# Patient Record
Sex: FEMALE | Race: WHITE | NOT HISPANIC OR LATINO | Employment: UNEMPLOYED | URBAN - METROPOLITAN AREA
[De-identification: names, ages, dates, MRNs, and addresses within clinical notes are randomized per-mention and may not be internally consistent; named-entity substitution may affect disease eponyms.]

---

## 2018-02-12 RX ORDER — CYCLOBENZAPRINE HCL 5 MG
5 TABLET ORAL EVERY 6 HOURS PRN
Refills: 5 | COMMUNITY
Start: 2018-01-25 | End: 2020-07-15

## 2018-02-12 RX ORDER — SIMETHICONE 80 MG
TABLET,CHEWABLE ORAL
Refills: 5 | COMMUNITY
Start: 2018-01-25 | End: 2020-07-15

## 2018-02-12 RX ORDER — LUBIPROSTONE 24 UG/1
CAPSULE, GELATIN COATED ORAL
Refills: 3 | COMMUNITY
Start: 2018-01-25 | End: 2018-03-22 | Stop reason: ALTCHOICE

## 2018-02-12 RX ORDER — LORATADINE 10 MG/1
10 TABLET ORAL DAILY
Refills: 5 | COMMUNITY
Start: 2018-01-29

## 2018-02-12 RX ORDER — ONDANSETRON 4 MG/1
4 TABLET, FILM COATED ORAL EVERY 8 HOURS PRN
Refills: 1 | COMMUNITY
Start: 2017-12-26 | End: 2018-03-22 | Stop reason: ALTCHOICE

## 2018-02-12 RX ORDER — HYDROCORTISONE ACETATE 25 MG/1
SUPPOSITORY RECTAL
Refills: 0 | COMMUNITY
Start: 2018-01-19 | End: 2020-07-15

## 2018-02-12 RX ORDER — FAMOTIDINE 20 MG/1
20 TABLET, FILM COATED ORAL 2 TIMES DAILY
Refills: 5 | COMMUNITY
Start: 2018-01-19 | End: 2019-01-14

## 2018-02-12 RX ORDER — BETHANECHOL CHLORIDE 25 MG/1
25 TABLET ORAL 4 TIMES DAILY
Refills: 1 | COMMUNITY
Start: 2018-01-29 | End: 2018-03-22 | Stop reason: ALTCHOICE

## 2018-02-12 RX ORDER — RANITIDINE 150 MG/1
150 TABLET ORAL 2 TIMES DAILY
Refills: 6 | COMMUNITY
Start: 2018-01-29 | End: 2019-01-14 | Stop reason: SDUPTHER

## 2018-02-12 RX ORDER — SUCRALFATE 1 G/1
TABLET ORAL
Refills: 5 | COMMUNITY
Start: 2018-01-19 | End: 2020-09-14

## 2018-02-12 RX ORDER — DIVALPROEX SODIUM 500 MG/1
500 TABLET, EXTENDED RELEASE ORAL 2 TIMES DAILY
Refills: 2 | COMMUNITY
Start: 2018-01-29

## 2018-02-12 RX ORDER — POLYETHYLENE GLYCOL 3350 17 G/17G
POWDER, FOR SOLUTION ORAL
Refills: 3 | COMMUNITY
Start: 2018-01-29 | End: 2019-11-02

## 2018-02-12 RX ORDER — LIDOCAINE HYDROCHLORIDE 30 MG/G
CREAM TOPICAL
Refills: 5 | COMMUNITY
Start: 2017-11-14 | End: 2020-07-15

## 2018-02-16 ENCOUNTER — OFFICE VISIT (OUTPATIENT)
Dept: GASTROENTEROLOGY | Facility: CLINIC | Age: 48
End: 2018-02-16

## 2018-02-16 VITALS
WEIGHT: 179 LBS | HEART RATE: 84 BPM | HEIGHT: 67 IN | SYSTOLIC BLOOD PRESSURE: 115 MMHG | DIASTOLIC BLOOD PRESSURE: 70 MMHG | BODY MASS INDEX: 28.09 KG/M2

## 2018-02-16 DIAGNOSIS — Z00.00 ROUTINE GENERAL MEDICAL EXAMINATION AT A HEALTH CARE FACILITY: Primary | ICD-10-CM

## 2018-03-22 ENCOUNTER — OFFICE VISIT (OUTPATIENT)
Dept: GASTROENTEROLOGY | Facility: CLINIC | Age: 48
End: 2018-03-22
Payer: COMMERCIAL

## 2018-03-22 VITALS
HEIGHT: 67 IN | WEIGHT: 172 LBS | HEART RATE: 84 BPM | BODY MASS INDEX: 27 KG/M2 | DIASTOLIC BLOOD PRESSURE: 72 MMHG | SYSTOLIC BLOOD PRESSURE: 118 MMHG

## 2018-03-22 DIAGNOSIS — K21.9 GASTROESOPHAGEAL REFLUX DISEASE, ESOPHAGITIS PRESENCE NOT SPECIFIED: ICD-10-CM

## 2018-03-22 DIAGNOSIS — R10.30 LOWER ABDOMINAL PAIN: ICD-10-CM

## 2018-03-22 DIAGNOSIS — K59.04 CHRONIC IDIOPATHIC CONSTIPATION: Primary | ICD-10-CM

## 2018-03-22 DIAGNOSIS — R13.14 PHARYNGOESOPHAGEAL DYSPHAGIA: ICD-10-CM

## 2018-03-22 PROCEDURE — 99214 OFFICE O/P EST MOD 30 MIN: CPT | Performed by: NURSE PRACTITIONER

## 2018-03-22 RX ORDER — PROMETHAZINE HYDROCHLORIDE 25 MG/1
25 TABLET ORAL EVERY 6 HOURS PRN
COMMUNITY
End: 2020-07-15

## 2018-03-22 NOTE — PROGRESS NOTES
Assessment/Plan:    Please stop the amitiza and decrease Miralax to once daily  We will begin Linzes 145 mcg- samples given  EGD and colonoscopy scheduled  Please follow up 2 weeks after the scopes so we can adjust the medications  Stop the Bethanechol- continue the dicyclomine, sucralfate as prescribed by PCP  Bring med list with next visit         Problem List Items Addressed This Visit     Chronic idiopathic constipation - Primary    Relevant Medications    Linaclotide (LINZESS) 145 MCG CAPS    Other Relevant Orders    Case request operating room: EGD AND COLONOSCOPY (Completed)    Lower abdominal pain    Relevant Orders    Case request operating room: EGD AND COLONOSCOPY (Completed)    Pharyngoesophageal dysphagia    Relevant Orders    Case request operating room: EGD AND COLONOSCOPY (Completed)    Gastroesophageal reflux disease    Relevant Orders    Case request operating room: EGD AND COLONOSCOPY (Completed)            Subjective:      Patient ID: Willie Hightower is a 57 y o  female  57year old female here - previous GI diagnoses include GERD and IBS constipation prominent  She also carries the diagnoses - bipolar and smokes 2 ppd of cigarettes  Last EGD and colonoscopy in 2016-   Patient reports at least a 6 month history of feeling food get stuck in her esophagus- solids only  She is currently taking Zantac BID, Pepcid BID and Nexium daily and is now also taking reglan and Carafate  Still no improvement  She has heartburn but no sore throat, worsening hoarse voice or vomiting  She does have occasional nausea  She has had longstanding constipation and hemorrhoids- she had banding of hemorrhoids in the past but they are back and her PCP ordered suppositories which she will continue to use  She does have lower abdominal pain prior and relieved by bowel movements, bowel movements every other day that are hard and she has to " push hard" to evacuate  No weight loss or fever   Longstanding fatigue but no worsening  She was prescribed Bethanechol by her former gastroenterologist but is now taking Bentyl with it- she will stop the prior and continue the Bentyl  No known glaucoma  Sweta Zaman is not working- she requires Miralax BID and is still having difficulties along with increase gas- we will switch to Linzes and decrease Miralax to daily and hold for loose stool Her  is here - we will adjust UGI meds with next visit  She will bring a list of meds with her next time          The following portions of the patient's history were reviewed and updated as appropriate:   She  has a past medical history of GERD (gastroesophageal reflux disease) and Psychiatric disorder  She   Patient Active Problem List    Diagnosis Date Noted    Chronic idiopathic constipation 03/22/2018    Lower abdominal pain 03/22/2018    Pharyngoesophageal dysphagia 03/22/2018    Gastroesophageal reflux disease 03/22/2018     She  has a past surgical history that includes Cholecystectomy and Tubal ligation  Her family history is not on file  She  reports that she has been smoking Cigarettes  She does not have any smokeless tobacco history on file  She reports that she does not drink alcohol or use drugs  Current Outpatient Prescriptions   Medication Sig Dispense Refill    citalopram (CeleXA) 20 mg tablet Take 20 mg by mouth daily      cyclobenzaprine (FLEXERIL) 5 mg tablet Take 5 mg by mouth every 6 (six) hours as needed  5    DEXILANT 60 MG capsule Take 1 capsule by mouth daily  2    dicyclomine (BENTYL) 20 mg tablet Take 20 mg by mouth every 6 (six) hours      divalproex sodium (DEPAKOTE ER) 500 mg 24 hr tablet Take 500 mg by mouth 2 (two) times a day  2    divalproex sodium (DEPAKOTE) 250 mg EC tablet Take 250 mg by mouth 2 (two) times a day      famotidine (PEPCID) 20 mg tablet Take 20 mg by mouth 2 (two) times a day  5    hydrocortisone (ANUSOL-HC) 25 mg suppository Insert 1 suppository into the rectum 2 times a day  0    Lidocaine HCl 3 % CREA Apply to back 3 times a day as needed  5    loratadine (CLARITIN) 10 mg tablet Take 10 mg by mouth daily  5    metoclopramide (REGLAN) 5 mg tablet Take 5 mg by mouth 4 (four) times a day      Multiple Vitamins-Calcium (ONE-A-DAY WOMENS FORMULA PO) Take by mouth      Multiple Vitamins-Minerals (ONE-A-DAY WOMENS 50+ ADVANTAGE PO) Take 1 tablet by mouth daily  11    polyethylene glycol (GLYCOLAX) powder mix 17g(1 measuring cap) WITH ONE cup OF WATER OR JUICE AND drink solution DAILY  3    promethazine (PHENERGAN) 25 mg tablet Take 25 mg by mouth every 6 (six) hours as needed for nausea or vomiting      ranitidine (ZANTAC) 150 mg tablet Take 150 mg by mouth 2 (two) times a day  6    simethicone (MYLICON) 80 mg chewable tablet TAKE ONE TABLET BY MOUTH EVERY SIX HOURS FOR FLATULENCE  5    sucralfate (CARAFATE) 1 g tablet TAKE ONE TABLET BY MOUTH FOUR TIMES DAILY Before meals and bedtime  5    Linaclotide (LINZESS) 145 MCG CAPS Take 1 capsule (145 mcg total) by mouth daily 16 capsule 0     No current facility-administered medications for this visit  Current Outpatient Prescriptions on File Prior to Visit   Medication Sig    citalopram (CeleXA) 20 mg tablet Take 20 mg by mouth daily    cyclobenzaprine (FLEXERIL) 5 mg tablet Take 5 mg by mouth every 6 (six) hours as needed    DEXILANT 60 MG capsule Take 1 capsule by mouth daily    dicyclomine (BENTYL) 20 mg tablet Take 20 mg by mouth every 6 (six) hours    divalproex sodium (DEPAKOTE ER) 500 mg 24 hr tablet Take 500 mg by mouth 2 (two) times a day    divalproex sodium (DEPAKOTE) 250 mg EC tablet Take 250 mg by mouth 2 (two) times a day    famotidine (PEPCID) 20 mg tablet Take 20 mg by mouth 2 (two) times a day    hydrocortisone (ANUSOL-HC) 25 mg suppository Insert 1 suppository into the rectum 2 times a day      Lidocaine HCl 3 % CREA Apply to back 3 times a day as needed    loratadine (CLARITIN) 10 mg tablet Take 10 mg by mouth daily    metoclopramide (REGLAN) 5 mg tablet Take 5 mg by mouth 4 (four) times a day    Multiple Vitamins-Calcium (ONE-A-DAY WOMENS FORMULA PO) Take by mouth    Multiple Vitamins-Minerals (ONE-A-DAY WOMENS 50+ ADVANTAGE PO) Take 1 tablet by mouth daily   polyethylene glycol (GLYCOLAX) powder mix 17g(1 measuring cap) WITH ONE cup OF WATER OR JUICE AND drink solution DAILY    ranitidine (ZANTAC) 150 mg tablet Take 150 mg by mouth 2 (two) times a day    simethicone (MYLICON) 80 mg chewable tablet TAKE ONE TABLET BY MOUTH EVERY SIX HOURS FOR FLATULENCE    sucralfate (CARAFATE) 1 g tablet TAKE ONE TABLET BY MOUTH FOUR TIMES DAILY Before meals and bedtime    [DISCONTINUED] AMITIZA 24 MCG capsule t1tc BY MOUTH TWICE DAILY WITH FOOD    [DISCONTINUED] mupirocin (BACTROBAN) 2 % ointment APPLY TO THE AFFECTED AREA(S) ON FACE TWICE DAILY FOR TEN DAYS    [DISCONTINUED] omeprazole (PriLOSEC) 20 mg delayed release capsule Take 20 mg by mouth daily    [DISCONTINUED] bethanechol (URECHOLINE) 25 mg tablet Take 25 mg by mouth 4 (four) times a day    [DISCONTINUED] ondansetron (ZOFRAN) 4 mg tablet Take 4 mg by mouth every 8 (eight) hours as needed for nausea     No current facility-administered medications on file prior to visit  She is allergic to penicillins       Review of Systems   Constitutional: Negative  HENT: Negative  Eyes: Negative  Respiratory: Negative  Cardiovascular: Negative  Gastrointestinal: Positive for constipation and nausea  Dysphagia   Endocrine: Negative  Genitourinary: Positive for difficulty urinating  Musculoskeletal: Negative  Objective:      /72   Pulse 84   Ht 5' 7" (1 702 m)   Wt 78 kg (172 lb)   BMI 26 94 kg/m²          Physical Exam   Constitutional: She appears well-developed and well-nourished  Eyes: No scleral icterus  Cardiovascular: Normal rate and regular rhythm  Pulmonary/Chest: She has wheezes  Abdominal: Soft  Bowel sounds are normal    Lymphadenopathy:     She has no cervical adenopathy  Skin: Skin is warm and dry

## 2018-03-26 ENCOUNTER — TELEPHONE (OUTPATIENT)
Dept: GASTROENTEROLOGY | Facility: CLINIC | Age: 48
End: 2018-03-26

## 2018-03-26 NOTE — TELEPHONE ENCOUNTER
Dr Jessica Del Angel pt    Pt called in to r/s egd/colon scheduled on 4/3   Please call her back @ [phone]

## 2018-04-02 ENCOUNTER — TELEPHONE (OUTPATIENT)
Dept: GASTROENTEROLOGY | Facility: CLINIC | Age: 48
End: 2018-04-02

## 2018-04-17 ENCOUNTER — OFFICE VISIT (OUTPATIENT)
Dept: GASTROENTEROLOGY | Facility: CLINIC | Age: 48
End: 2018-04-17
Payer: COMMERCIAL

## 2018-04-17 VITALS
BODY MASS INDEX: 26.84 KG/M2 | HEART RATE: 80 BPM | HEIGHT: 67 IN | SYSTOLIC BLOOD PRESSURE: 122 MMHG | DIASTOLIC BLOOD PRESSURE: 70 MMHG | WEIGHT: 171 LBS

## 2018-04-17 DIAGNOSIS — R10.30 LOWER ABDOMINAL PAIN: ICD-10-CM

## 2018-04-17 DIAGNOSIS — K21.9 GASTROESOPHAGEAL REFLUX DISEASE, ESOPHAGITIS PRESENCE NOT SPECIFIED: ICD-10-CM

## 2018-04-17 DIAGNOSIS — K59.04 CHRONIC IDIOPATHIC CONSTIPATION: Primary | ICD-10-CM

## 2018-04-17 PROCEDURE — 99214 OFFICE O/P EST MOD 30 MIN: CPT | Performed by: NURSE PRACTITIONER

## 2018-04-17 NOTE — PROGRESS NOTES
Assessment/Plan:    The  for [facility] will fax over medications       Problem List Items Addressed This Visit     Chronic idiopathic constipation - Primary    Lower abdominal pain    Gastroesophageal reflux disease            Subjective:      Patient ID: Merced Aquino is a 57 y o  female  46-year-old female is here for repeat visit  She was specimen B visit 2 weeks after the scopes but she rescheduled the scopes until May 1  I tried to review her upper GI meds looks like she is taken at least 5 could be more  I prescribed Carafate 4 and asked her to stop her Pepcid and Zantac at her last visit but it doesn't look like she has stopped  The Carafate is helping her  Her  will obtain the medications that The pharmacy send over to the patient's Y can have a more accurate idea of what her medications are  She really does not complain of upper GI symptoms presently but she does have some generalized lower abdominal pain that seems to be improved now that she is on Linzess-she is having daily bowel movements that are soft and formed  She reports no hemorrhoids and her appetite has improved  She continues to smoke cigarettes        The following portions of the patient's history were reviewed and updated as appropriate:   She  has a past medical history of GERD (gastroesophageal reflux disease) and Psychiatric disorder  She   Patient Active Problem List    Diagnosis Date Noted    Chronic idiopathic constipation 03/22/2018    Lower abdominal pain 03/22/2018    Pharyngoesophageal dysphagia 03/22/2018    Gastroesophageal reflux disease 03/22/2018     She  has a past surgical history that includes Cholecystectomy and Tubal ligation  Her family history is not on file  She  reports that she has been smoking Cigarettes  She does not have any smokeless tobacco history on file  She reports that she does not drink alcohol or use drugs    Current Outpatient Prescriptions   Medication Sig Dispense Refill  citalopram (CeleXA) 20 mg tablet Take 20 mg by mouth daily      cyclobenzaprine (FLEXERIL) 5 mg tablet Take 5 mg by mouth every 6 (six) hours as needed  5    DEXILANT 60 MG capsule Take 1 capsule by mouth daily  2    dicyclomine (BENTYL) 20 mg tablet Take 20 mg by mouth every 6 (six) hours      divalproex sodium (DEPAKOTE ER) 500 mg 24 hr tablet Take 500 mg by mouth 2 (two) times a day  2    divalproex sodium (DEPAKOTE) 250 mg EC tablet Take 250 mg by mouth 2 (two) times a day      famotidine (PEPCID) 20 mg tablet Take 20 mg by mouth 2 (two) times a day  5    hydrocortisone (ANUSOL-HC) 25 mg suppository Insert 1 suppository into the rectum 2 times a day   0    Lidocaine HCl 3 % CREA Apply to back 3 times a day as needed  5    Linaclotide (LINZESS) 145 MCG CAPS Take 1 capsule (145 mcg total) by mouth daily 16 capsule 0    loratadine (CLARITIN) 10 mg tablet Take 10 mg by mouth daily  5    metoclopramide (REGLAN) 5 mg tablet Take 5 mg by mouth 4 (four) times a day      Multiple Vitamins-Calcium (ONE-A-DAY WOMENS FORMULA PO) Take by mouth      Multiple Vitamins-Minerals (ONE-A-DAY WOMENS 50+ ADVANTAGE PO) Take 1 tablet by mouth daily  11    polyethylene glycol (GLYCOLAX) powder mix 17g(1 measuring cap) WITH ONE cup OF WATER OR JUICE AND drink solution DAILY  3    promethazine (PHENERGAN) 25 mg tablet Take 25 mg by mouth every 6 (six) hours as needed for nausea or vomiting      ranitidine (ZANTAC) 150 mg tablet Take 150 mg by mouth 2 (two) times a day  6    simethicone (MYLICON) 80 mg chewable tablet TAKE ONE TABLET BY MOUTH EVERY SIX HOURS FOR FLATULENCE  5    sucralfate (CARAFATE) 1 g tablet TAKE ONE TABLET BY MOUTH FOUR TIMES DAILY Before meals and bedtime  5     No current facility-administered medications for this visit        Current Outpatient Prescriptions on File Prior to Visit   Medication Sig    citalopram (CeleXA) 20 mg tablet Take 20 mg by mouth daily    cyclobenzaprine (FLEXERIL) 5 mg tablet Take 5 mg by mouth every 6 (six) hours as needed    DEXILANT 60 MG capsule Take 1 capsule by mouth daily    dicyclomine (BENTYL) 20 mg tablet Take 20 mg by mouth every 6 (six) hours    divalproex sodium (DEPAKOTE ER) 500 mg 24 hr tablet Take 500 mg by mouth 2 (two) times a day    divalproex sodium (DEPAKOTE) 250 mg EC tablet Take 250 mg by mouth 2 (two) times a day    famotidine (PEPCID) 20 mg tablet Take 20 mg by mouth 2 (two) times a day    hydrocortisone (ANUSOL-HC) 25 mg suppository Insert 1 suppository into the rectum 2 times a day   Lidocaine HCl 3 % CREA Apply to back 3 times a day as needed    Linaclotide (LINZESS) 145 MCG CAPS Take 1 capsule (145 mcg total) by mouth daily    loratadine (CLARITIN) 10 mg tablet Take 10 mg by mouth daily    metoclopramide (REGLAN) 5 mg tablet Take 5 mg by mouth 4 (four) times a day    Multiple Vitamins-Calcium (ONE-A-DAY WOMENS FORMULA PO) Take by mouth    Multiple Vitamins-Minerals (ONE-A-DAY WOMENS 50+ ADVANTAGE PO) Take 1 tablet by mouth daily   polyethylene glycol (GLYCOLAX) powder mix 17g(1 measuring cap) WITH ONE cup OF WATER OR JUICE AND drink solution DAILY    promethazine (PHENERGAN) 25 mg tablet Take 25 mg by mouth every 6 (six) hours as needed for nausea or vomiting    ranitidine (ZANTAC) 150 mg tablet Take 150 mg by mouth 2 (two) times a day    simethicone (MYLICON) 80 mg chewable tablet TAKE ONE TABLET BY MOUTH EVERY SIX HOURS FOR FLATULENCE    sucralfate (CARAFATE) 1 g tablet TAKE ONE TABLET BY MOUTH FOUR TIMES DAILY Before meals and bedtime     No current facility-administered medications on file prior to visit  She is allergic to penicillins       Review of Systems   Constitutional: Negative  HENT: Negative  Respiratory: Negative  Cardiovascular: Negative  Gastrointestinal: Positive for abdominal distention and abdominal pain (generalized lower abdominal pain)           Objective:      /70   Pulse 80   Ht 5' 7" (1 702 m)   Wt 77 6 kg (171 lb)   BMI 26 78 kg/m²          Physical Exam   Constitutional: She is oriented to person, place, and time  Eyes: No scleral icterus  Cardiovascular: Normal rate and regular rhythm  Pulmonary/Chest: Effort normal and breath sounds normal    Abdominal: Soft  Bowel sounds are normal    Lymphadenopathy:     She has no cervical adenopathy  Neurological: She is alert and oriented to person, place, and time

## 2018-04-26 ENCOUNTER — APPOINTMENT (EMERGENCY)
Dept: CT IMAGING | Facility: HOSPITAL | Age: 48
End: 2018-04-26
Payer: COMMERCIAL

## 2018-04-26 ENCOUNTER — HOSPITAL ENCOUNTER (EMERGENCY)
Facility: HOSPITAL | Age: 48
Discharge: HOME | End: 2018-04-26
Attending: EMERGENCY MEDICINE | Admitting: EMERGENCY MEDICINE
Payer: COMMERCIAL

## 2018-04-26 VITALS
HEIGHT: 67 IN | BODY MASS INDEX: 27.47 KG/M2 | RESPIRATION RATE: 18 BRPM | WEIGHT: 175 LBS | HEART RATE: 90 BPM | OXYGEN SATURATION: 98 % | TEMPERATURE: 98.6 F | SYSTOLIC BLOOD PRESSURE: 117 MMHG | DIASTOLIC BLOOD PRESSURE: 66 MMHG

## 2018-04-26 DIAGNOSIS — R10.9 FLANK PAIN: Primary | ICD-10-CM

## 2018-04-26 LAB
ANION GAP SERPL CALCULATED.3IONS-SCNC: 5 MMOL/L (ref 4–13)
BASOPHILS # BLD AUTO: 0.04 THOUSANDS/ΜL (ref 0–0.1)
BASOPHILS NFR BLD AUTO: 1 % (ref 0–1)
BUN SERPL-MCNC: 15 MG/DL (ref 5–25)
CALCIUM SERPL-MCNC: 8.8 MG/DL (ref 8.3–10.1)
CHLORIDE SERPL-SCNC: 104 MMOL/L (ref 100–108)
CLARITY, POC: CLEAR
CO2 SERPL-SCNC: 29 MMOL/L (ref 21–32)
COLOR, POC: YELLOW
CREAT SERPL-MCNC: 0.77 MG/DL (ref 0.6–1.3)
EOSINOPHIL # BLD AUTO: 0.08 THOUSAND/ΜL (ref 0–0.61)
EOSINOPHIL NFR BLD AUTO: 1 % (ref 0–6)
ERYTHROCYTE [DISTWIDTH] IN BLOOD BY AUTOMATED COUNT: 12.3 % (ref 11.6–15.1)
EXT BILIRUBIN, UA: NORMAL
EXT BLOOD URINE: NORMAL
EXT GLUCOSE, UA: NORMAL
EXT KETONES: NORMAL
EXT NITRITE, UA: NORMAL
EXT PH, UA: 6.5
EXT PREG TEST URINE: NORMAL
EXT PROTEIN, UA: NORMAL
EXT SPECIFIC GRAVITY, UA: 1
EXT UROBILINOGEN: NORMAL
GFR SERPL CREATININE-BSD FRML MDRD: 93 ML/MIN/1.73SQ M
GLUCOSE SERPL-MCNC: 80 MG/DL (ref 65–140)
HCT VFR BLD AUTO: 40.9 % (ref 34.8–46.1)
HGB BLD-MCNC: 14 G/DL (ref 11.5–15.4)
LIPASE SERPL-CCNC: 96 U/L (ref 73–393)
LYMPHOCYTES # BLD AUTO: 2.06 THOUSANDS/ΜL (ref 0.6–4.47)
LYMPHOCYTES NFR BLD AUTO: 29 % (ref 14–44)
MCH RBC QN AUTO: 34 PG (ref 26.8–34.3)
MCHC RBC AUTO-ENTMCNC: 34.2 G/DL (ref 31.4–37.4)
MCV RBC AUTO: 99 FL (ref 82–98)
MONOCYTES # BLD AUTO: 0.75 THOUSAND/ΜL (ref 0.17–1.22)
MONOCYTES NFR BLD AUTO: 11 % (ref 4–12)
NEUTROPHILS # BLD AUTO: 4.21 THOUSANDS/ΜL (ref 1.85–7.62)
NEUTS SEG NFR BLD AUTO: 59 % (ref 43–75)
NRBC BLD AUTO-RTO: 0 /100 WBCS
PLATELET # BLD AUTO: 283 THOUSANDS/UL (ref 149–390)
PMV BLD AUTO: 9 FL (ref 8.9–12.7)
POTASSIUM SERPL-SCNC: 3.8 MMOL/L (ref 3.5–5.3)
RBC # BLD AUTO: 4.12 MILLION/UL (ref 3.81–5.12)
SODIUM SERPL-SCNC: 138 MMOL/L (ref 136–145)
WBC # BLD AUTO: 7.16 THOUSAND/UL (ref 4.31–10.16)
WBC # BLD EST: NORMAL 10*3/UL

## 2018-04-26 PROCEDURE — 74176 CT ABD & PELVIS W/O CONTRAST: CPT

## 2018-04-26 PROCEDURE — 85025 COMPLETE CBC W/AUTO DIFF WBC: CPT | Performed by: EMERGENCY MEDICINE

## 2018-04-26 PROCEDURE — 96374 THER/PROPH/DIAG INJ IV PUSH: CPT

## 2018-04-26 PROCEDURE — 80048 BASIC METABOLIC PNL TOTAL CA: CPT | Performed by: EMERGENCY MEDICINE

## 2018-04-26 PROCEDURE — 83690 ASSAY OF LIPASE: CPT | Performed by: EMERGENCY MEDICINE

## 2018-04-26 PROCEDURE — 96361 HYDRATE IV INFUSION ADD-ON: CPT

## 2018-04-26 PROCEDURE — 81002 URINALYSIS NONAUTO W/O SCOPE: CPT | Performed by: EMERGENCY MEDICINE

## 2018-04-26 PROCEDURE — 99284 EMERGENCY DEPT VISIT MOD MDM: CPT

## 2018-04-26 PROCEDURE — 81025 URINE PREGNANCY TEST: CPT | Performed by: EMERGENCY MEDICINE

## 2018-04-26 PROCEDURE — 36415 COLL VENOUS BLD VENIPUNCTURE: CPT | Performed by: EMERGENCY MEDICINE

## 2018-04-26 RX ORDER — SODIUM CHLORIDE 9 MG/ML
125 INJECTION, SOLUTION INTRAVENOUS CONTINUOUS
Status: DISCONTINUED | OUTPATIENT
Start: 2018-04-26 | End: 2018-04-26 | Stop reason: HOSPADM

## 2018-04-26 RX ORDER — KETOROLAC TROMETHAMINE 30 MG/ML
30 INJECTION, SOLUTION INTRAMUSCULAR; INTRAVENOUS ONCE
Status: COMPLETED | OUTPATIENT
Start: 2018-04-26 | End: 2018-04-26

## 2018-04-26 RX ADMIN — KETOROLAC TROMETHAMINE 30 MG: 30 INJECTION, SOLUTION INTRAMUSCULAR at 11:27

## 2018-04-26 RX ADMIN — SODIUM CHLORIDE 125 ML/HR: 0.9 INJECTION, SOLUTION INTRAVENOUS at 10:58

## 2018-04-26 NOTE — ED PROVIDER NOTES
History  Chief Complaint   Patient presents with    Flank Pain     left flank pain started couple days ago, worse today     Patient is a 61-year-old female  She presents to the emergency room with a 3 day history of left flank pain  It is a sharp stabbing pain  It is getting worse  Moderate in intensity  No aggravating or relieving factors  She denies any trauma  No nausea or vomiting  No diarrhea  No hematemesis, hematochezia or melena  She does have a history of chronic constipation  She denies constipation at this time  No fever or chills  No urinary complaints other than frequency  No vaginal complaints  No history of kidney stones  No history of this kind of pain in the past   There is no rash  Prior to Admission Medications   Prescriptions Last Dose Informant Patient Reported? Taking? DEXILANT 60 MG capsule  Self Yes No   Sig: Take 1 capsule by mouth daily   Lidocaine HCl 3 % CREA  Self Yes No   Sig: Apply to back 3 times a day as needed   Linaclotide (LINZESS) 145 MCG CAPS  Self No No   Sig: Take 1 capsule (145 mcg total) by mouth daily   Multiple Vitamins-Calcium (ONE-A-DAY WOMENS FORMULA PO)  Self Yes No   Sig: Take by mouth   Multiple Vitamins-Minerals (ONE-A-DAY WOMENS 50+ ADVANTAGE PO)  Self Yes No   Sig: Take 1 tablet by mouth daily     citalopram (CeleXA) 20 mg tablet  Self Yes No   Sig: Take 20 mg by mouth daily   cyclobenzaprine (FLEXERIL) 5 mg tablet  Self Yes No   Sig: Take 5 mg by mouth every 6 (six) hours as needed   dicyclomine (BENTYL) 20 mg tablet  Self Yes No   Sig: Take 20 mg by mouth every 6 (six) hours   divalproex sodium (DEPAKOTE ER) 500 mg 24 hr tablet  Self Yes No   Sig: Take 500 mg by mouth 2 (two) times a day   divalproex sodium (DEPAKOTE) 250 mg EC tablet  Self Yes No   Sig: Take 250 mg by mouth 2 (two) times a day   famotidine (PEPCID) 20 mg tablet  Self Yes No   Sig: Take 20 mg by mouth 2 (two) times a day   hydrocortisone (ANUSOL-HC) 25 mg suppository Self Yes No   Sig: Insert 1 suppository into the rectum 2 times a day  loratadine (CLARITIN) 10 mg tablet  Self Yes No   Sig: Take 10 mg by mouth daily   metoclopramide (REGLAN) 5 mg tablet  Self Yes No   Sig: Take 5 mg by mouth 4 (four) times a day   polyethylene glycol (GLYCOLAX) powder  Self Yes No   Sig: mix 17g(1 measuring cap) WITH ONE cup OF WATER OR JUICE AND drink solution DAILY   promethazine (PHENERGAN) 25 mg tablet  Self Yes No   Sig: Take 25 mg by mouth every 6 (six) hours as needed for nausea or vomiting   ranitidine (ZANTAC) 150 mg tablet  Self Yes No   Sig: Take 150 mg by mouth 2 (two) times a day   simethicone (MYLICON) 80 mg chewable tablet  Self Yes No   Sig: TAKE ONE TABLET BY MOUTH EVERY SIX HOURS FOR FLATULENCE   sucralfate (CARAFATE) 1 g tablet  Self Yes No   Sig: TAKE ONE TABLET BY MOUTH FOUR TIMES DAILY Before meals and bedtime      Facility-Administered Medications: None       Past Medical History:   Diagnosis Date    GERD (gastroesophageal reflux disease)     Psychiatric disorder        Past Surgical History:   Procedure Laterality Date    CHOLECYSTECTOMY      TUBAL LIGATION         History reviewed  No pertinent family history  I have reviewed and agree with the history as documented  Social History   Substance Use Topics    Smoking status: Current Every Day Smoker     Types: Cigarettes    Smokeless tobacco: Not on file    Alcohol use No        Review of Systems   Constitutional: Negative for chills and fever  HENT: Negative for rhinorrhea and sore throat  Eyes: Negative for pain, redness and visual disturbance  Respiratory: Negative for cough and shortness of breath  Cardiovascular: Negative for chest pain and leg swelling  Gastrointestinal: Negative for abdominal pain, diarrhea and vomiting  Endocrine: Negative for polydipsia and polyuria  Genitourinary: Positive for flank pain and frequency   Negative for dysuria, hematuria, vaginal bleeding and vaginal discharge  Musculoskeletal: Negative for back pain and neck pain  Skin: Negative for rash and wound  Allergic/Immunologic: Negative for immunocompromised state  Neurological: Negative for weakness, numbness and headaches  Hematological: Does not bruise/bleed easily  Psychiatric/Behavioral: Negative for hallucinations and suicidal ideas  All other systems reviewed and are negative  Physical Exam  ED Triage Vitals [04/26/18 1034]   Temperature Pulse Respirations Blood Pressure SpO2   98 6 °F (37 °C) 96 18 147/72 100 %      Temp src Heart Rate Source Patient Position - Orthostatic VS BP Location FiO2 (%)   -- -- -- -- --      Pain Score       Worst Possible Pain           Orthostatic Vital Signs  Vitals:    04/26/18 1034 04/26/18 1249   BP: 147/72 117/66   Pulse: 96 90       Physical Exam   Constitutional: She is oriented to person, place, and time  She appears well-developed and well-nourished  No distress  HENT:   Head: Normocephalic and atraumatic  Mouth/Throat: Oropharynx is clear and moist    Eyes: Conjunctivae are normal  Right eye exhibits no discharge  Left eye exhibits no discharge  No scleral icterus  Neck: Normal range of motion  Neck supple  Cardiovascular: Normal rate, regular rhythm, normal heart sounds and intact distal pulses  Exam reveals no gallop and no friction rub  No murmur heard  Pulmonary/Chest: Effort normal  No stridor  No respiratory distress  She has wheezes  She has no rales  There is scattered wheezes bilaterally  Patient is a smoker  Abdominal: Soft  Bowel sounds are normal  She exhibits no distension  There is tenderness  There is no rebound and no guarding  There is tenderness to the left flank  It is very lateral   It is between the lateral aspect of the lower lip ribs and the iliac crest   There is no tenderness to the anterior abdomen  No CVA tenderness  Musculoskeletal: Normal range of motion  She exhibits no edema, tenderness or deformity  No calf pain  Neurological: She is alert and oriented to person, place, and time  She has normal strength  No sensory deficit  GCS eye subscore is 4  GCS verbal subscore is 5  GCS motor subscore is 6  Skin: Skin is warm and dry  No rash noted  She is not diaphoretic  Psychiatric: She has a normal mood and affect  Her behavior is normal    Vitals reviewed  ED Medications  Medications   sodium chloride 0 9 % infusion (0 mL/hr Intravenous Stopped 4/26/18 1200)   ketorolac (TORADOL) injection 30 mg (30 mg Intravenous Given 4/26/18 1127)       Diagnostic Studies  Results Reviewed     Procedure Component Value Units Date/Time    Lipase [[id number]]  (Normal) Collected:  04/26/18 1052    Lab Status:  Final result Specimen:  Blood from Arm, Right Updated:  04/26/18 1158     Lipase 96 u/L     POCT pregnancy, urine [[id number]]  (Normal) Resulted:  04/26/18 1126    Lab Status:  Final result Updated:  04/26/18 1126     EXT PREG TEST UR (Ref: Negative) neg    Basic metabolic panel [[id number]] Collected:  04/26/18 1052    Lab Status:  Final result Specimen:  Blood from Arm, Right Updated:  04/26/18 1114     Sodium 138 mmol/L      Potassium 3 8 mmol/L      Chloride 104 mmol/L      CO2 29 mmol/L      Anion Gap 5 mmol/L      BUN 15 mg/dL      Creatinine 0 77 mg/dL      Glucose 80 mg/dL      Calcium 8 8 mg/dL      eGFR 93 ml/min/1 73sq m     Narrative:         National Kidney Disease Education Program recommendations are as follows:  GFR calculation is accurate only with a steady state creatinine  Chronic Kidney disease less than 60 ml/min/1 73 sq  meters  Kidney failure less than 15 ml/min/1 73 sq  meters      POCT urinalysis dipstick [[id number]]  (Normal) Resulted:  04/26/18 1102    Lab Status:  Final result Updated:  04/26/18 1102     Color, UA yellow     Clarity, UA clear     EXT Glucose, UA (Ref: Negative) neg     EXT Bilirubin, UA (Ref: Negative) neg     EXT Ketones, UA (Ref: Negative) neg     EXT Spec Grav, UA 1 005 EXT Blood, UA (Ref: Negative) large     EXT pH, UA 6 5     EXT Protein, UA (Ref: Negative) neg     EXT Urobilinogen, UA (Ref: 0 2- 1 0) neg     EXT Leukocytes, UA (Ref: Negative) neg     EXT Nitrite, UA (Ref: Negative) neg    CBC and differential [[id number]]  (Abnormal) Collected:  04/26/18 1052    Lab Status:  Final result Specimen:  Blood from Arm, Right Updated:  04/26/18 1058     WBC 7 16 Thousand/uL      RBC 4 12 Million/uL      Hemoglobin 14 0 g/dL      Hematocrit 40 9 %      MCV 99 (H) fL      MCH 34 0 pg      MCHC 34 2 g/dL      RDW 12 3 %      MPV 9 0 fL      Platelets 378 Thousands/uL      nRBC 0 /100 WBCs      Neutrophils Relative 59 %      Lymphocytes Relative 29 %      Monocytes Relative 11 %      Eosinophils Relative 1 %      Basophils Relative 1 %      Neutrophils Absolute 4 21 Thousands/µL      Lymphocytes Absolute 2 06 Thousands/µL      Monocytes Absolute 0 75 Thousand/µL      Eosinophils Absolute 0 08 Thousand/µL      Basophils Absolute 0 04 Thousands/µL                  CT renal stone study abdomen pelvis without contrast   Final Result by Cleveland Cohen MD (04/26 1148)   No urinary calculi identified  Workstation performed: [id number]                    Procedures  Procedures       Phone Contacts  ED Phone Contact    ED Course               PERC Rule for PE      Most Recent Value   PERC Rule for PE   Age >=50  0 Filed at: 04/26/2018 1126   HR >=100  0 Filed at: 04/26/2018 1126   O2 Sat on room air < 95%  0 Filed at: 04/26/2018 1126   History of PE or DVT  0 Filed at: 04/26/2018 1126   Recent trauma or surgery  0 Filed at: 04/26/2018 1126   Hemoptysis  0 Filed at: 04/26/2018 1126   Exogenous estrogen  0 Filed at: 04/26/2018 1126   Unilateral leg swelling  0 Filed at: 04/26/2018 1126   [address]  Rule for PE Results  0 Filed at: 04/26/2018 1126                      MDM  Number of Diagnoses or Management Options  Diagnosis management comments: CT scan was unremarkable    Laboratory evaluation was only significant for hematuria  Otherwise unremarkable  This is not pyelonephritis  This is not pulmonary embolism  This is not dissection  This is not a kidney stone  I suspect this may end up being musculoskeletal as the pain is very lateral   Patient has benign abdomen  She is appropriate for discharge and outpatient management  Amount and/or Complexity of Data Reviewed  Clinical lab tests: ordered and reviewed  Tests in the radiology section of CPT®: ordered and reviewed      CritCare Time    Disposition  Final diagnoses:   Flank pain     Time reflects when diagnosis was documented in both MDM as applicable and the Disposition within this note     Time User Action Codes Description Comment    4/26/2018  1:11 PM Gillian Escalera Add [R10 9] Flank pain       ED Disposition     ED Disposition Condition Comment    Discharge  [address] discharge to home/self care  Condition at discharge: Good        Follow-up Information     Follow up With Specialties Details Why Sameer Rodriguez MD  In 2 days  [address]  [facility]  [address]  [phone]          Patient's Medications   Discharge Prescriptions    No medications on file     No discharge procedures on file      ED Provider  Electronically Signed by           Yoandy Decker MD  04/26/18 7385

## 2018-04-26 NOTE — DISCHARGE INSTRUCTIONS
Motrin for pain        Flank Pain   WHAT YOU NEED TO KNOW:   Flank pain is felt in the area below your ribcage and above your hip bones, often in the lower back  Your pain may be dull or so severe that you cannot get comfortable  The pain may stay in one area or radiate to another area  It may worsen and lighten in waves  Flank pain is often a sign of problems with your urinary tract, such as a kidney stone or infection  DISCHARGE INSTRUCTIONS:   Return to the emergency department if:   · You have a fever  · Your heart is fluttering or jumping  · You see blood in your urine  · Your pain radiates into your lower abdomen and genital area  · You have intense pain in your low back next to your spine  · You are much more tired than usual and have no desire to eat  · You have a headache and your muscles jerk  Contact your healthcare provider if:   · You have an upset stomach and are vomiting  · You have to urinate more often, and with urgency  · Your pain worsens or does not improve, and you cannot get comfortable  · You pass a stone when you urinate  · You have questions or concerns about your condition or care  Medicines: The following medicines may be ordered for you:  · Pain medicine  may help decrease or relieve your pain  Do not wait until the pain is severe before you take your medicine  · Antibiotics  may help treat a urinary tract infection caused by bacteria  · Take your medicine as directed  Contact your healthcare provider if you think your medicine is not helping or if you have side effects  Tell him of her if you are allergic to any medicine  Keep a list of the medicines, vitamins, and herbs you take  Include the amounts, and when and why you take them  Bring the list or the pill bottles to follow-up visits  Carry your medicine list with you in case of an emergency    Follow up with your healthcare provider in 1 to 2 days or as directed:  Write down your questions so you remember to ask them during your visits  © 2017 [address] Information is for End User's use only and may not be sold, redistributed or otherwise used for commercial purposes  All illustrations and images included in CareNotes® are the copyrighted property of [employer]  or [employer]  The above information is an  only  It is not intended as medical advice for individual conditions or treatments  Talk to your doctor, nurse or pharmacist before following any medical regimen to see if it is safe and effective for you

## 2018-04-30 ENCOUNTER — TELEPHONE (OUTPATIENT)
Dept: GASTROENTEROLOGY | Facility: CLINIC | Age: 48
End: 2018-04-30

## 2018-05-02 ENCOUNTER — TELEPHONE (OUTPATIENT)
Dept: GASTROENTEROLOGY | Facility: CLINIC | Age: 48
End: 2018-05-02

## 2018-05-23 ENCOUNTER — TELEPHONE (OUTPATIENT)
Dept: GASTROENTEROLOGY | Facility: CLINIC | Age: 48
End: 2018-05-23

## 2018-05-24 DIAGNOSIS — R11.0 NAUSEA: Primary | ICD-10-CM

## 2018-05-24 RX ORDER — ONDANSETRON 4 MG/1
4 TABLET, FILM COATED ORAL EVERY 8 HOURS PRN
Qty: 8 TABLET | Refills: 0 | Status: SHIPPED | OUTPATIENT
Start: 2018-05-24 | End: 2018-06-21 | Stop reason: SDUPTHER

## 2018-05-24 NOTE — TELEPHONE ENCOUNTER
zofran prescribed- 8 pills enough for 2 days if she is still sick she needs to be seen by either her pcp or gi

## 2018-06-18 ENCOUNTER — ANESTHESIA EVENT (OUTPATIENT)
Dept: PERIOP | Facility: HOSPITAL | Age: 48
End: 2018-06-18

## 2018-06-19 ENCOUNTER — ANESTHESIA (OUTPATIENT)
Dept: PERIOP | Facility: HOSPITAL | Age: 48
End: 2018-06-19

## 2018-06-21 ENCOUNTER — TELEPHONE (OUTPATIENT)
Dept: GASTROENTEROLOGY | Facility: CLINIC | Age: 48
End: 2018-06-21

## 2018-06-21 DIAGNOSIS — R11.0 NAUSEA: ICD-10-CM

## 2018-06-21 RX ORDER — ONDANSETRON 4 MG/1
4 TABLET, FILM COATED ORAL EVERY 8 HOURS PRN
Qty: 20 TABLET | Refills: 0 | Status: SHIPPED | OUTPATIENT
Start: 2018-06-21 | End: 2020-07-15

## 2018-06-21 NOTE — TELEPHONE ENCOUNTER
ptn been away caring for her relative with cancer  Today she said she has bad diarrhea and vomiting   She would like a call back

## 2018-12-15 ENCOUNTER — HOSPITAL ENCOUNTER (EMERGENCY)
Facility: HOSPITAL | Age: 48
Discharge: HOME | End: 2018-12-15
Attending: EMERGENCY MEDICINE | Admitting: EMERGENCY MEDICINE
Payer: COMMERCIAL

## 2018-12-15 ENCOUNTER — APPOINTMENT (EMERGENCY)
Dept: CT IMAGING | Facility: HOSPITAL | Age: 48
End: 2018-12-15
Payer: COMMERCIAL

## 2018-12-15 VITALS
HEART RATE: 101 BPM | WEIGHT: 184.75 LBS | OXYGEN SATURATION: 97 % | DIASTOLIC BLOOD PRESSURE: 78 MMHG | TEMPERATURE: 97.6 F | SYSTOLIC BLOOD PRESSURE: 129 MMHG | BODY MASS INDEX: 28.94 KG/M2

## 2018-12-15 DIAGNOSIS — Q04.6 COLLOID CYST OF THIRD VENTRICLE (HCC): ICD-10-CM

## 2018-12-15 DIAGNOSIS — R51.9 ACUTE NONINTRACTABLE HEADACHE, UNSPECIFIED HEADACHE TYPE: Primary | ICD-10-CM

## 2018-12-15 PROCEDURE — 99284 EMERGENCY DEPT VISIT MOD MDM: CPT

## 2018-12-15 PROCEDURE — 96374 THER/PROPH/DIAG INJ IV PUSH: CPT

## 2018-12-15 PROCEDURE — 96361 HYDRATE IV INFUSION ADD-ON: CPT

## 2018-12-15 PROCEDURE — 96375 TX/PRO/DX INJ NEW DRUG ADDON: CPT

## 2018-12-15 PROCEDURE — 70450 CT HEAD/BRAIN W/O DYE: CPT

## 2018-12-15 RX ORDER — SODIUM CHLORIDE 9 MG/ML
1000 INJECTION, SOLUTION INTRAVENOUS ONCE
Status: COMPLETED | OUTPATIENT
Start: 2018-12-15 | End: 2018-12-15

## 2018-12-15 RX ORDER — DIPHENHYDRAMINE HYDROCHLORIDE 50 MG/ML
25 INJECTION INTRAMUSCULAR; INTRAVENOUS ONCE
Status: COMPLETED | OUTPATIENT
Start: 2018-12-15 | End: 2018-12-15

## 2018-12-15 RX ORDER — METOCLOPRAMIDE HYDROCHLORIDE 5 MG/ML
10 INJECTION INTRAMUSCULAR; INTRAVENOUS ONCE
Status: COMPLETED | OUTPATIENT
Start: 2018-12-15 | End: 2018-12-15

## 2018-12-15 RX ADMIN — DIPHENHYDRAMINE HYDROCHLORIDE 25 MG: 50 INJECTION, SOLUTION INTRAMUSCULAR; INTRAVENOUS at 15:21

## 2018-12-15 RX ADMIN — SODIUM CHLORIDE 1000 ML/HR: 0.9 INJECTION, SOLUTION INTRAVENOUS at 15:21

## 2018-12-15 RX ADMIN — METOCLOPRAMIDE 10 MG: 5 INJECTION, SOLUTION INTRAMUSCULAR; INTRAVENOUS at 15:22

## 2018-12-15 NOTE — DISCHARGE INSTRUCTIONS
Acute Headache, Ambulatory Care   GENERAL INFORMATION:   An acute headache  is pain or discomfort that starts suddenly and gets worse quickly  The cause of an acute headache may not be known  It may be triggered by stress, fatigue, hormones, food, or trauma  Common related symptoms include the following:   · Fever    · Sinus pressure    · Loss of memory    · Nausea or vomiting    · Problems with your vision, such as watery or red eyes, loss of vision, or pain in bright light    · Stiff neck    · Tenderness of the head and neck area    · Trouble staying awake, or being less alert than usual     · Weakness or less energy  Seek immediate care for the following symptoms:   · Severe pain    · A headache that occurs after a blow to the head, a fall, or other trauma     · Confusion or forgetfulness    · Numbness on one side of your face or body  Treatment for an acute headache  may include medicine to decrease pain  You may also need biofeedback or cognitive behavioral therapy  Ask your healthcare provider about these and other treatments for an acute headache  Manage my symptoms:   · Apply heat  on your head for 20 to 30 minutes every 2 hours for as many days as directed  Heat helps decrease pain and muscle spasms  You may alternate heat and ice  · Apply ice  on your head for 15 to 20 minutes every hour or as directed  Use an ice pack, or put crushed ice in a plastic bag  Cover it with a towel  Ice helps decrease pain  · Relax your muscles  Lie down in a comfortable position and close your eyes  Relax your muscles slowly  Start at your toes and work your way up your body  · Keep a record of your headaches  Write down when your headaches start and stop  Include your symptoms and what you were doing when the headache began  Record what you ate or drank for 24 hours before the headache started  Describe the pain and where it hurts  Keep track of what you did to treat your headache and whether it worked    Follow up with your healthcare provider as directed:  Bring your headache record with you when you see your healthcare provider  Write down your questions so you remember to ask them during your visits  CARE AGREEMENT:   You have the right to help plan your care  Learn about your health condition and how it may be treated  Discuss treatment options with your caregivers to decide what care you want to receive  You always have the right to refuse treatment  The above information is an  only  It is not intended as medical advice for individual conditions or treatments  Talk to your doctor, nurse or pharmacist before following any medical regimen to see if it is safe and effective for you  © 2014 [address] is for End User's use only and may not be sold, redistributed or otherwise used for commercial purposes  All illustrations and images included in CareNotes® are the copyrighted property of [employer]  or [employer]

## 2018-12-15 NOTE — ED PROVIDER NOTES
History  Chief Complaint   Patient presents with    Headache     pt presents via EMS for headache x 1 week     53-year-old female presents with a headache for the past week  She states that she went to see her doctor who was ordered a CT head, went to [facility] ED and had a CT scan of her head which she states had a lesion    She states the pain is in the back of her head, nonradiating and constant  She has been taking Motrin without  improvement  She states she has an MRI scheduled for Monday  Headache is not maximal in onset  She states she has had headaches in the past but not this severe  Denies any neck pain, fevers, dizziness, visual changes  Denies taking any illegal drugs  Denies any chest pain, shortness of breath, syncope  Prior to Admission Medications   Prescriptions Last Dose Informant Patient Reported? Taking? DEXILANT 60 MG capsule  Self Yes No   Sig: Take 1 capsule by mouth daily   Lidocaine HCl 3 % CREA  Self Yes No   Sig: Apply to back 3 times a day as needed   Linaclotide (LINZESS) 145 MCG CAPS  Self No No   Sig: Take 1 capsule (145 mcg total) by mouth daily   Multiple Vitamins-Calcium (ONE-A-DAY WOMENS FORMULA PO)  Self Yes No   Sig: Take by mouth   Multiple Vitamins-Minerals (ONE-A-DAY WOMENS 50+ ADVANTAGE PO)  Self Yes No   Sig: Take 1 tablet by mouth daily  Multiple Vitamins-Minerals (ONE-A-DAY WOMENS PETITES) TABS   Yes No   Sig: Take 1 tablet by mouth daily     citalopram (CeleXA) 20 mg tablet  Self Yes No   Sig: Take 20 mg by mouth daily   cyclobenzaprine (FLEXERIL) 5 mg tablet  Self Yes No   Sig: Take 5 mg by mouth every 6 (six) hours as needed   dicyclomine (BENTYL) 20 mg tablet  Self Yes No   Sig: Take 20 mg by mouth every 6 (six) hours   divalproex sodium (DEPAKOTE ER) 500 mg 24 hr tablet  Self Yes No   Sig: Take 500 mg by mouth 2 (two) times a day   divalproex sodium (DEPAKOTE) 250 mg EC tablet  Self Yes No   Sig: Take 250 mg by mouth 2 (two) times a day famotidine (PEPCID) 20 mg tablet  Self Yes No   Sig: Take 20 mg by mouth 2 (two) times a day   hydrocortisone (ANUSOL-HC) 25 mg suppository  Self Yes No   Sig: Insert 1 suppository into the rectum 2 times a day  loratadine (CLARITIN) 10 mg tablet  Self Yes No   Sig: Take 10 mg by mouth daily   metoclopramide (REGLAN) 5 mg tablet  Self Yes No   Sig: Take 5 mg by mouth 4 (four) times a day   ondansetron (ZOFRAN) 4 mg tablet   No No   Sig: Take 1 tablet (4 mg total) by mouth every 8 (eight) hours as needed for nausea or vomiting   polyethylene glycol (GLYCOLAX) powder  Self Yes No   Sig: mix 17g(1 measuring cap) WITH ONE cup OF WATER OR JUICE AND drink solution DAILY   promethazine (PHENERGAN) 25 mg tablet  Self Yes No   Sig: Take 25 mg by mouth every 6 (six) hours as needed for nausea or vomiting   ranitidine (ZANTAC) 150 mg tablet  Self Yes No   Sig: Take 150 mg by mouth 2 (two) times a day   simethicone (MYLICON) 80 mg chewable tablet  Self Yes No   Sig: TAKE ONE TABLET BY MOUTH EVERY SIX HOURS FOR FLATULENCE   sucralfate (CARAFATE) 1 g tablet  Self Yes No   Sig: TAKE ONE TABLET BY MOUTH FOUR TIMES DAILY Before meals and bedtime      Facility-Administered Medications: None       Past Medical History:   Diagnosis Date    GERD (gastroesophageal reflux disease)     Psychiatric disorder        Past Surgical History:   Procedure Laterality Date    CHOLECYSTECTOMY      TUBAL LIGATION         History reviewed  No pertinent family history  I have reviewed and agree with the history as documented  Social History   Substance Use Topics    Smoking status: Current Every Day Smoker     Types: Cigarettes    Smokeless tobacco: Not on file    Alcohol use No        Review of Systems   Constitutional: Negative for chills and fever  HENT: Negative for dental problem and ear pain  Eyes: Negative for pain and redness  Respiratory: Negative for cough and shortness of breath      Cardiovascular: Negative for chest pain and palpitations  Gastrointestinal: Negative for abdominal pain and nausea  Endocrine: Negative for polydipsia and polyphagia  Genitourinary: Negative for dysuria and frequency  Musculoskeletal: Negative for arthralgias and joint swelling  Skin: Negative for color change and rash  Neurological: Positive for headaches  Negative for dizziness  Psychiatric/Behavioral: Negative for behavioral problems and confusion  All other systems reviewed and are negative  Physical Exam  Physical Exam   Constitutional: She is oriented to person, place, and time  She appears well-developed and well-nourished  No distress  HENT:   Head: Atraumatic  Right Ear: External ear normal    Left Ear: External ear normal    Nose: Nose normal    Eyes: Pupils are equal, round, and reactive to light  Conjunctivae and EOM are normal    Neck: Normal range of motion  Neck supple  No JVD present  No nuchal rigidity   Cardiovascular: Normal rate, regular rhythm and normal heart sounds  No murmur heard  Pulmonary/Chest: Effort normal and breath sounds normal  No respiratory distress  She has no wheezes  Abdominal: Soft  Bowel sounds are normal  She exhibits no distension  There is no tenderness  Musculoskeletal: Normal range of motion  She exhibits no edema  Neurological: She is alert and oriented to person, place, and time  No cranial nerve deficit  CN II-XII intact grossly, no focal deficits  Normal strength and sensation in b/l upper and lower extremities  Normal FNF and rapid alternating hand movements   Skin: Skin is warm and dry  Capillary refill takes less than 2 seconds  She is not diaphoretic  Psychiatric: She has a normal mood and affect  Her behavior is normal    Nursing note and vitals reviewed        Vital Signs  ED Triage Vitals [12/15/18 1421]   Temperature Pulse Resp Blood Pressure SpO2   97 6 °F (36 4 °C) 101 -- 129/78 97 %      Temp Source Heart Rate Source Patient Position - Orthostatic VS BP Location FiO2 (%)   Oral Monitor Lying Right arm --      Pain Score       --           Vitals:    12/15/18 1421   BP: 129/78   Pulse: 101   Patient Position - Orthostatic VS: Lying       Visual Acuity      ED Medications  Medications   sodium chloride 0 9 % infusion (1,000 mL/hr Intravenous New Bag 12/15/18 1521)   metoclopramide (REGLAN) injection 10 mg (10 mg Intravenous Given 12/15/18 1522)   diphenhydrAMINE (BENADRYL) injection 25 mg (25 mg Intravenous Given 12/15/18 1521)       Diagnostic Studies  Results Reviewed     None                 CT head without contrast   Final Result by Georgeanne Dubin, MD (12/15 1629)      No acute intracranial process  4 x 3 x 3 mm colloid cyst roof of the 3rd ventricle  This could be followed up with nonemergent brain MRI  The examination demonstrates a significant  finding and was [facility] as such [facility] for liaison and referring practitioner notification  Workstation performed: [id number]                    Procedures  Procedures       Phone Contacts  ED Phone Contact    ED Course                               MDM  Number of Diagnoses or Management Options  Acute nonintractable headache, unspecified headache type:   Colloid cyst of third ventricle [facility]):   Diagnosis management comments: 58 yo F presents with headache for the past week, constant, in back of head  Normal neuro exam  CT head shows colloid cyst on top of third ventricle, MRI recommended nonemergent basis, states she has this scheduled on Monday  Headache resolved with ivf, reglan, benadryl in ED, patient not requiring any further medication, ? Migraine, doubt SAH as negative CT head, no neck stiffness, not maximal in onset, not severe  No fever, no neck stiffness, over a week in duration, meningitis very unlikely   She agrees to follow up with pcp and to have MRI as scheduled    CritCare Time    Disposition  Final diagnoses:   Acute nonintractable headache, unspecified headache type Colloid cyst of third ventricle [facility])     Time reflects when diagnosis was documented in both MDM as applicable and the Disposition within this note     Time User Action Codes Description Comment    12/15/2018  4:25 PM Apollo Roberts Add [R51] Acute nonintractable headache, unspecified headache type     12/15/2018  4:34 PM Apollo Roberts Add [Q04 6] Colloid cyst of third ventricle [facility])       ED Disposition     ED Disposition Condition Comment    Discharge  [address] discharge to home/self care  Condition at discharge: Good        Follow-up Information     Follow up With Specialties Details Why Paola Currie MD Pediatrics  As needed [address]  [address]  [phone]            Patient's Medications   Discharge Prescriptions    No medications on file     No discharge procedures on file      ED Provider  Electronically Signed by           Manuelito Chavarria MD  12/15/18 4646

## 2019-01-14 DIAGNOSIS — K21.9 GASTROESOPHAGEAL REFLUX DISEASE WITHOUT ESOPHAGITIS: Primary | ICD-10-CM

## 2019-01-14 RX ORDER — RANITIDINE 150 MG/1
150 TABLET ORAL 2 TIMES DAILY
Qty: 60 TABLET | Refills: 6 | Status: SHIPPED | OUTPATIENT
Start: 2019-01-14 | End: 2020-07-15

## 2019-01-15 ENCOUNTER — TELEPHONE (OUTPATIENT)
Dept: GASTROENTEROLOGY | Facility: CLINIC | Age: 49
End: 2019-01-15

## 2019-01-16 ENCOUNTER — TELEPHONE (OUTPATIENT)
Dept: GASTROENTEROLOGY | Facility: CLINIC | Age: 49
End: 2019-01-16

## 2019-01-16 NOTE — TELEPHONE ENCOUNTER
Pt called she was in the hospital yesterday she was told that her colon is inflamed , she is in discomfort still

## 2019-01-16 NOTE — TELEPHONE ENCOUNTER
Spoke with patient  H/O GERD, upper abdominal pain, nausea, vomting    Patient c/o vomiting with all meals, abdominal pain 10/10 without relief  Patient was in [place] [address] she has a CT scan which showed inflammation of the colon  We have requested patients records from yesterday  They sent patient home with Cirpo? Flagyl 10 day course and reglan  Patient has not started these medications  Denies fever, chills, weakness, SOB  Advised patient to start ABX and nausea medication ASAP  IF symptoms are sever go to [facility] ED for evaluation  She has a follow-up 1/30/2019 with Margaret

## 2019-01-18 ENCOUNTER — APPOINTMENT (EMERGENCY)
Dept: CT IMAGING | Facility: HOSPITAL | Age: 49
End: 2019-01-18
Payer: COMMERCIAL

## 2019-01-18 ENCOUNTER — APPOINTMENT (EMERGENCY)
Dept: RADIOLOGY | Facility: HOSPITAL | Age: 49
End: 2019-01-18
Payer: COMMERCIAL

## 2019-01-18 ENCOUNTER — TELEPHONE (OUTPATIENT)
Dept: GASTROENTEROLOGY | Facility: CLINIC | Age: 49
End: 2019-01-18

## 2019-01-18 ENCOUNTER — HOSPITAL ENCOUNTER (EMERGENCY)
Facility: HOSPITAL | Age: 49
Discharge: HOME | End: 2019-01-18
Attending: EMERGENCY MEDICINE | Admitting: EMERGENCY MEDICINE
Payer: COMMERCIAL

## 2019-01-18 VITALS
SYSTOLIC BLOOD PRESSURE: 139 MMHG | DIASTOLIC BLOOD PRESSURE: 79 MMHG | OXYGEN SATURATION: 99 % | TEMPERATURE: 98.3 F | RESPIRATION RATE: 18 BRPM | HEIGHT: 67 IN | BODY MASS INDEX: 28.25 KG/M2 | HEART RATE: 91 BPM | WEIGHT: 180 LBS

## 2019-01-18 DIAGNOSIS — N83.209 OVARIAN CYST: ICD-10-CM

## 2019-01-18 DIAGNOSIS — R11.2 NAUSEA & VOMITING: ICD-10-CM

## 2019-01-18 DIAGNOSIS — R10.32 ABDOMINAL PAIN, LEFT LOWER QUADRANT: Primary | ICD-10-CM

## 2019-01-18 DIAGNOSIS — R10.2 LEFT ADNEXAL TENDERNESS: ICD-10-CM

## 2019-01-18 LAB
ALBUMIN SERPL BCP-MCNC: 3.6 G/DL (ref 3.5–5)
ALP SERPL-CCNC: 67 U/L (ref 46–116)
ALT SERPL W P-5'-P-CCNC: 15 U/L (ref 12–78)
ANION GAP SERPL CALCULATED.3IONS-SCNC: 8 MMOL/L (ref 4–13)
AST SERPL W P-5'-P-CCNC: 13 U/L (ref 5–45)
BASOPHILS # BLD AUTO: 0.04 THOUSANDS/ΜL (ref 0–0.1)
BASOPHILS NFR BLD AUTO: 1 % (ref 0–1)
BILIRUB SERPL-MCNC: 0.2 MG/DL (ref 0.2–1)
BILIRUB UR QL STRIP: NEGATIVE
BUN SERPL-MCNC: 19 MG/DL (ref 5–25)
CALCIUM SERPL-MCNC: 9.1 MG/DL (ref 8.3–10.1)
CHLORIDE SERPL-SCNC: 101 MMOL/L (ref 100–108)
CLARITY UR: CLEAR
CO2 SERPL-SCNC: 29 MMOL/L (ref 21–32)
COLOR UR: YELLOW
CREAT SERPL-MCNC: 0.84 MG/DL (ref 0.6–1.3)
EOSINOPHIL # BLD AUTO: 0.07 THOUSAND/ΜL (ref 0–0.61)
EOSINOPHIL NFR BLD AUTO: 1 % (ref 0–6)
ERYTHROCYTE [DISTWIDTH] IN BLOOD BY AUTOMATED COUNT: 11.8 % (ref 11.6–15.1)
FLUAV AG SPEC QL: NORMAL
FLUBV AG SPEC QL: NORMAL
GFR SERPL CREATININE-BSD FRML MDRD: 83 ML/MIN/1.73SQ M
GLUCOSE SERPL-MCNC: 97 MG/DL (ref 65–140)
GLUCOSE UR STRIP-MCNC: NEGATIVE MG/DL
HCT VFR BLD AUTO: 41.7 % (ref 34.8–46.1)
HGB BLD-MCNC: 14.7 G/DL (ref 11.5–15.4)
HGB UR QL STRIP.AUTO: NEGATIVE
IMM GRANULOCYTES # BLD AUTO: 0.03 THOUSAND/UL (ref 0–0.2)
IMM GRANULOCYTES NFR BLD AUTO: 0 % (ref 0–2)
KETONES UR STRIP-MCNC: NEGATIVE MG/DL
LEUKOCYTE ESTERASE UR QL STRIP: NEGATIVE
LIPASE SERPL-CCNC: 97 U/L (ref 73–393)
LYMPHOCYTES # BLD AUTO: 2.06 THOUSANDS/ΜL (ref 0.6–4.47)
LYMPHOCYTES NFR BLD AUTO: 26 % (ref 14–44)
MCH RBC QN AUTO: 34.3 PG (ref 26.8–34.3)
MCHC RBC AUTO-ENTMCNC: 35.3 G/DL (ref 31.4–37.4)
MCV RBC AUTO: 97 FL (ref 82–98)
MONOCYTES # BLD AUTO: 0.9 THOUSAND/ΜL (ref 0.17–1.22)
MONOCYTES NFR BLD AUTO: 11 % (ref 4–12)
NEUTROPHILS # BLD AUTO: 4.87 THOUSANDS/ΜL (ref 1.85–7.62)
NEUTS SEG NFR BLD AUTO: 61 % (ref 43–75)
NITRITE UR QL STRIP: NEGATIVE
NRBC BLD AUTO-RTO: 0 /100 WBCS
PH UR STRIP.AUTO: 7 [PH] (ref 4.5–8)
PLATELET # BLD AUTO: 284 THOUSANDS/UL (ref 149–390)
PMV BLD AUTO: 9.1 FL (ref 8.9–12.7)
POTASSIUM SERPL-SCNC: 4.2 MMOL/L (ref 3.5–5.3)
PROT SERPL-MCNC: 6.9 G/DL (ref 6.4–8.2)
PROT UR STRIP-MCNC: NEGATIVE MG/DL
RBC # BLD AUTO: 4.29 MILLION/UL (ref 3.81–5.12)
RSV B RNA SPEC QL NAA+PROBE: NORMAL
SODIUM SERPL-SCNC: 138 MMOL/L (ref 136–145)
SP GR UR STRIP.AUTO: 1.02 (ref 1–1.03)
UROBILINOGEN UR QL STRIP.AUTO: 0.2 E.U./DL
WBC # BLD AUTO: 7.97 THOUSAND/UL (ref 4.31–10.16)

## 2019-01-18 PROCEDURE — 71046 X-RAY EXAM CHEST 2 VIEWS: CPT

## 2019-01-18 PROCEDURE — 85025 COMPLETE CBC W/AUTO DIFF WBC: CPT | Performed by: EMERGENCY MEDICINE

## 2019-01-18 PROCEDURE — 74177 CT ABD & PELVIS W/CONTRAST: CPT

## 2019-01-18 PROCEDURE — 81003 URINALYSIS AUTO W/O SCOPE: CPT | Performed by: EMERGENCY MEDICINE

## 2019-01-18 PROCEDURE — 96376 TX/PRO/DX INJ SAME DRUG ADON: CPT

## 2019-01-18 PROCEDURE — 80053 COMPREHEN METABOLIC PANEL: CPT | Performed by: EMERGENCY MEDICINE

## 2019-01-18 PROCEDURE — 96374 THER/PROPH/DIAG INJ IV PUSH: CPT

## 2019-01-18 PROCEDURE — 87631 RESP VIRUS 3-5 TARGETS: CPT | Performed by: EMERGENCY MEDICINE

## 2019-01-18 PROCEDURE — 83690 ASSAY OF LIPASE: CPT | Performed by: EMERGENCY MEDICINE

## 2019-01-18 PROCEDURE — 96375 TX/PRO/DX INJ NEW DRUG ADDON: CPT

## 2019-01-18 PROCEDURE — 99285 EMERGENCY DEPT VISIT HI MDM: CPT

## 2019-01-18 PROCEDURE — 36415 COLL VENOUS BLD VENIPUNCTURE: CPT

## 2019-01-18 PROCEDURE — 87491 CHLMYD TRACH DNA AMP PROBE: CPT | Performed by: EMERGENCY MEDICINE

## 2019-01-18 PROCEDURE — 87591 N.GONORRHOEAE DNA AMP PROB: CPT | Performed by: EMERGENCY MEDICINE

## 2019-01-18 RX ORDER — MORPHINE SULFATE 4 MG/ML
4 INJECTION, SOLUTION INTRAMUSCULAR; INTRAVENOUS ONCE
Status: COMPLETED | OUTPATIENT
Start: 2019-01-18 | End: 2019-01-18

## 2019-01-18 RX ORDER — NAPROXEN 500 MG/1
500 TABLET ORAL 2 TIMES DAILY WITH MEALS
Qty: 20 TABLET | Refills: 0 | Status: SHIPPED | OUTPATIENT
Start: 2019-01-18 | End: 2019-01-28

## 2019-01-18 RX ORDER — DICYCLOMINE HCL 20 MG
20 TABLET ORAL ONCE
Status: COMPLETED | OUTPATIENT
Start: 2019-01-18 | End: 2019-01-18

## 2019-01-18 RX ORDER — ONDANSETRON 2 MG/ML
4 INJECTION INTRAMUSCULAR; INTRAVENOUS ONCE
Status: COMPLETED | OUTPATIENT
Start: 2019-01-18 | End: 2019-01-18

## 2019-01-18 RX ORDER — OXYCODONE HYDROCHLORIDE AND ACETAMINOPHEN 5; 325 MG/1; MG/1
1 TABLET ORAL EVERY 6 HOURS PRN
Qty: 10 TABLET | Refills: 0 | Status: SHIPPED | OUTPATIENT
Start: 2019-01-18 | End: 2019-01-23

## 2019-01-18 RX ORDER — KETOROLAC TROMETHAMINE 30 MG/ML
15 INJECTION, SOLUTION INTRAMUSCULAR; INTRAVENOUS ONCE
Status: COMPLETED | OUTPATIENT
Start: 2019-01-18 | End: 2019-01-18

## 2019-01-18 RX ORDER — DICYCLOMINE HCL 20 MG
20 TABLET ORAL EVERY 6 HOURS
Qty: 20 TABLET | Refills: 0 | Status: SHIPPED | OUTPATIENT
Start: 2019-01-18 | End: 2020-07-15

## 2019-01-18 RX ORDER — ONDANSETRON 4 MG/1
4 TABLET, ORALLY DISINTEGRATING ORAL EVERY 6 HOURS PRN
Qty: 20 TABLET | Refills: 0 | Status: SHIPPED | OUTPATIENT
Start: 2019-01-18 | End: 2019-11-02

## 2019-01-18 RX ADMIN — KETOROLAC TROMETHAMINE 15 MG: 30 INJECTION, SOLUTION INTRAMUSCULAR at 13:53

## 2019-01-18 RX ADMIN — DICYCLOMINE HYDROCHLORIDE 20 MG: 20 TABLET ORAL at 13:53

## 2019-01-18 RX ADMIN — ONDANSETRON 4 MG: 2 INJECTION INTRAMUSCULAR; INTRAVENOUS at 13:53

## 2019-01-18 RX ADMIN — ONDANSETRON 4 MG: 2 INJECTION INTRAMUSCULAR; INTRAVENOUS at 17:24

## 2019-01-18 RX ADMIN — MORPHINE SULFATE 4 MG: 4 INJECTION INTRAVENOUS at 16:39

## 2019-01-18 RX ADMIN — IOHEXOL 100 ML: 350 INJECTION, SOLUTION INTRAVENOUS at 14:16

## 2019-01-18 NOTE — ED PROVIDER NOTES
History  Chief Complaint   Patient presents with    Abdominal Pain     dx with infamed colon at [facility], meds they gave are not working     The 28-year-old female presents for abdominal pain  She states that she was evaluated at [facility] last week, they gave her antibiotics for colitis and she is not yet feeling improvement  She presents for evaluation treatment  She states that her GI doctor had switch to [facility] so she presents to [facility] to have her workup performed here at request of her GI doctor  She denies fevers and chills  She admits to nausea and vomiting  Her she has soft bowel movements but no diarrhea, no constipation  Prior to Admission Medications   Prescriptions Last Dose Informant Patient Reported? Taking? DEXILANT 60 MG capsule  Self Yes No   Sig: Take 1 capsule by mouth daily   Lidocaine HCl 3 % CREA  Self Yes No   Sig: Apply to back 3 times a day as needed   Linaclotide (LINZESS) 145 MCG CAPS  Self No No   Sig: Take 1 capsule (145 mcg total) by mouth daily   Multiple Vitamins-Calcium (ONE-A-DAY WOMENS FORMULA PO)  Self Yes No   Sig: Take by mouth   Multiple Vitamins-Minerals (ONE-A-DAY WOMENS 50+ ADVANTAGE PO)  Self Yes No   Sig: Take 1 tablet by mouth daily  Multiple Vitamins-Minerals (ONE-A-DAY WOMENS PETITES) TABS   Yes No   Sig: Take 1 tablet by mouth daily     citalopram (CeleXA) 20 mg tablet  Self Yes No   Sig: Take 20 mg by mouth daily   cyclobenzaprine (FLEXERIL) 5 mg tablet  Self Yes No   Sig: Take 5 mg by mouth every 6 (six) hours as needed   dicyclomine (BENTYL) 20 mg tablet  Self Yes No   Sig: Take 20 mg by mouth every 6 (six) hours   divalproex sodium (DEPAKOTE ER) 500 mg 24 hr tablet  Self Yes No   Sig: Take 500 mg by mouth 2 (two) times a day   divalproex sodium (DEPAKOTE) 250 mg EC tablet  Self Yes No   Sig: Take 250 mg by mouth 2 (two) times a day   hydrocortisone (ANUSOL-HC) 25 mg suppository  Self Yes No   Sig: Insert 1 suppository into the rectum 2 times a day  loratadine (CLARITIN) 10 mg tablet  Self Yes No   Sig: Take 10 mg by mouth daily   metoclopramide (REGLAN) 5 mg tablet  Self Yes No   Sig: Take 5 mg by mouth 4 (four) times a day   ondansetron (ZOFRAN) 4 mg tablet   No No   Sig: Take 1 tablet (4 mg total) by mouth every 8 (eight) hours as needed for nausea or vomiting   polyethylene glycol (GLYCOLAX) powder  Self Yes No   Sig: mix 17g(1 measuring cap) WITH ONE cup OF WATER OR JUICE AND drink solution DAILY   promethazine (PHENERGAN) 25 mg tablet  Self Yes No   Sig: Take 25 mg by mouth every 6 (six) hours as needed for nausea or vomiting   ranitidine (ZANTAC) 150 mg tablet   No No   Sig: Take 1 tablet (150 mg total) by mouth 2 (two) times a day   simethicone (MYLICON) 80 mg chewable tablet  Self Yes No   Sig: TAKE ONE TABLET BY MOUTH EVERY SIX HOURS FOR FLATULENCE   sucralfate (CARAFATE) 1 g tablet  Self Yes No   Sig: TAKE ONE TABLET BY MOUTH FOUR TIMES DAILY Before meals and bedtime      Facility-Administered Medications: None       Past Medical History:   Diagnosis Date    GERD (gastroesophageal reflux disease)     Psychiatric disorder        Past Surgical History:   Procedure Laterality Date    CHOLECYSTECTOMY      TUBAL LIGATION         History reviewed  No pertinent family history  I have reviewed and agree with the history as documented  Social History   Substance Use Topics    Smoking status: Current Every Day Smoker     Types: Cigarettes    Smokeless tobacco: Not on file    Alcohol use No        Review of Systems   Constitutional: Positive for appetite change (Decreased)  Negative for chills, fatigue and fever  HENT: Negative for congestion and sore throat  Respiratory: Negative for apnea, cough, chest tightness and shortness of breath  Cardiovascular: Negative for chest pain and palpitations  Gastrointestinal: Positive for abdominal pain ( left lower quadrant), nausea and vomiting   Negative for constipation and diarrhea ( soft stool but no diarrhea)  Genitourinary: Negative for dysuria and flank pain  Musculoskeletal: Negative for back pain and neck pain  Skin: Negative for color change and rash  Allergic/Immunologic: Negative for immunocompromised state  Neurological: Negative for dizziness, syncope and headaches  Physical Exam  Physical Exam   Constitutional: She is oriented to person, place, and time  She appears well-developed and well-nourished  No distress  HENT:   Head: Normocephalic and atraumatic  Oropharynx is clear but mildly dry consistent with dehydration   Eyes: Pupils are equal, round, and reactive to light  Conjunctivae and EOM are normal  Right eye exhibits no discharge  Left eye exhibits no discharge  No scleral icterus  Neck: Normal range of motion  Neck supple  No JVD present  Cardiovascular: Normal rate, regular rhythm, normal heart sounds and intact distal pulses  Exam reveals no gallop and no friction rub  No murmur heard  Pulmonary/Chest: Effort normal and breath sounds normal  No respiratory distress  She has no wheezes  She has no rales  She exhibits no tenderness  Abdominal: Soft  Bowel sounds are normal  She exhibits no distension  There is tenderness (Left lower quadrant)  There is no rebound and no guarding  Genitourinary: Vagina normal  No vaginal discharge found  Genitourinary Comments: Pelvic exam performed, left-sided tenderness, no discharge, otherwise normal pelvic exam   No external signs of vaginal lesions  Musculoskeletal: Normal range of motion  She exhibits no edema, tenderness or deformity  Neurological: She is alert and oriented to person, place, and time  No cranial nerve deficit  Skin: Skin is warm and dry  No rash noted  She is not diaphoretic  No erythema  No pallor  Psychiatric: She has a normal mood and affect  Her behavior is normal    Vitals reviewed        Vital Signs  ED Triage Vitals   Temperature Pulse Respirations Blood Pressure SpO2   01/18/19 1210 01/18/19 1210 01/18/19 1210 01/18/19 1210 01/18/19 1210   98 3 °F (36 8 °C) 99 18 (!) 144/102 96 %      Temp src Heart Rate Source Patient Position - Orthostatic VS BP Location FiO2 (%)   -- 01/18/19 1630 01/18/19 1552 01/18/19 1552 --    Monitor Sitting Left arm       Pain Score       01/18/19 1210       Worst Possible Pain           Vitals:    01/18/19 1500 01/18/19 1552 01/18/19 1630 01/18/19 1819   BP: 136/65 129/64 137/82 139/79   Pulse: 95 93 96 91   Patient Position - Orthostatic VS:  Sitting Lying        Visual Acuity      ED Medications  Medications   dicyclomine (BENTYL) tablet 20 mg (20 mg Oral Given 1/18/19 1353)   ketorolac (TORADOL) injection 15 mg (15 mg Intravenous Given 1/18/19 1353)   ondansetron (ZOFRAN) injection 4 mg (4 mg Intravenous Given 1/18/19 1353)   iohexol (OMNIPAQUE) 350 MG/ML injection (MULTI-DOSE) 100 mL (100 mL Intravenous Given 1/18/19 1416)   morphine (PF) 4 mg/mL injection 4 mg (4 mg Intravenous Given 1/18/19 1639)   ondansetron (ZOFRAN) injection 4 mg (4 mg Intravenous Given 1/18/19 1724)       Diagnostic Studies  Results Reviewed     Procedure Component Value Units Date/Time    Chlamydia/GC amplified DNA by PCR [[id number]]  (Normal) Collected:  01/18/19 1735    Lab Status:  Final result Specimen:  Cervix Updated:  01/20/19 1226     N gonorrhoeae, DNA Probe Negative     Chlamydia trachomatis, DNA Probe Negative    Narrative:       Test performed using PCR amplification of target DNA  This test is intended as an aid in the diagnosis of Chlamydial and gonococcal disease  This test has not been evaluated in patients younger than 15years of age and is not recommended for evaluation of suspected sexual abuse  Additional testing is recommended when the results do not correlate with clinical signs and symptoms      Influenza A/B and RSV by PCR [[id number]]  (Normal) Collected:  01/18/19 1353    Lab Status:  Final result Specimen: Nasopharyngeal from Nasopharyngeal Swab Updated:  01/18/19 2310     INFLU A PCR None Detected     INFLU B PCR None Detected     RSV PCR None Detected    Comprehensive metabolic panel [[id number]] Collected:  01/18/19 1253    Lab Status:  Final result Specimen:  Blood from Arm, Right Updated:  01/18/19 1321     Sodium 138 mmol/L      Potassium 4 2 mmol/L      Chloride 101 mmol/L      CO2 29 mmol/L      ANION GAP 8 mmol/L      BUN 19 mg/dL      Creatinine 0 84 mg/dL      Glucose 97 mg/dL      Calcium 9 1 mg/dL      AST 13 U/L      ALT 15 U/L      Alkaline Phosphatase 67 U/L      Total Protein 6 9 g/dL      Albumin 3 6 g/dL      Total Bilirubin 0 20 mg/dL      eGFR 83 ml/min/1 73sq m     Narrative:         National Kidney Disease Education Program recommendations are as follows:  GFR calculation is accurate only with a steady state creatinine  Chronic Kidney disease less than 60 ml/min/1 73 sq  meters  Kidney failure less than 15 ml/min/1 73 sq  meters      Lipase [[id number]]  (Normal) Collected:  01/18/19 1253    Lab Status:  Final result Specimen:  Blood from Arm, Right Updated:  01/18/19 1321     Lipase 97 u/L     UA w Reflex to Microscopic w Reflex to Culture [[id number]] Collected:  01/18/19 1257    Lab Status:  Final result Specimen:  Urine from Urine, Clean Catch Updated:  01/18/19 1301     Color, UA Yellow     Clarity, UA Clear     Specific [place], UA 1 020     pH, UA 7 0     Leukocytes, UA Negative     Nitrite, UA Negative     Protein, UA Negative mg/dl      Glucose, UA Negative mg/dl      Ketones, UA Negative mg/dl      Urobilinogen, UA 0 2 E U /dl      Bilirubin, UA Negative     Blood, UA Negative    CBC and differential [[id number]] Collected:  01/18/19 1253    Lab Status:  Final result Specimen:  Blood from Arm, Right Updated:  01/18/19 1300     WBC 7 97 Thousand/uL      RBC 4 29 Million/uL      Hemoglobin 14 7 g/dL      Hematocrit 41 7 %      MCV 97 fL      MCH 34 3 pg      MCHC 35 3 g/dL      RDW 11 8 % MPV 9 1 fL      Platelets 778 Thousands/uL      nRBC 0 /100 WBCs      Neutrophils Relative 61 %      Immat GRANS % 0 %      Lymphocytes Relative 26 %      Monocytes Relative 11 %      Eosinophils Relative 1 %      Basophils Relative 1 %      Neutrophils Absolute 4 87 Thousands/µL      Immature Grans Absolute 0 03 Thousand/uL      Lymphocytes Absolute 2 06 Thousands/µL      Monocytes Absolute 0 90 Thousand/µL      Eosinophils Absolute 0 07 Thousand/µL      Basophils Absolute 0 04 Thousands/µL                  XR chest 2 views   ED Interpretation by Isaac Warner DO (01/18 1505)   No acute cardiopulmonary abnormalities  Final Result by Slade Brian MD (01/18 1520)      No acute cardiopulmonary disease  Workstation performed: [id number]         CT abdomen pelvis w contrast   ED Interpretation by Isaac Warner DO (01/18 1504)   No acute inflammatory stranding   No abnormal dilation of bowel loops      Final Result by Tanesha Contreras MD (01/18 1450)   No acute inflammatory stranding   No abnormal dilation of bowel loops               Workstation performed: [id number]                    Procedures  Procedures       Phone Contacts  ED Phone Contact    ED Course  ED Course as of Jan 28 2242 Fri Jan 18, 2019   1401 WBC: 7 97                               MDM  Number of Diagnoses or Management Options  Abdominal pain, left lower quadrant:   Left adnexal tenderness:   Nausea & vomiting:   Ovarian cyst:   Diagnosis management comments: Previously treated for colitis  Still having left lower quadrant abdominal pain  Patient is evaluated with CT scan, abdominal lab work  She is found to have a left-sided ovarian cyst   Any evidence of colitis is now resolved  Will not change her medication for her colitis  Will advise follow-up with primary care provider, gastrointestinal physician, and OB physician  Given good return precautions in case of signs of worsening abdominal condition  Amount and/or Complexity of Data Reviewed  Clinical lab tests: ordered and reviewed  Tests in the radiology section of CPT®: ordered and reviewed      CritCare Time    Disposition  Final diagnoses:   Abdominal pain, left lower quadrant   Left adnexal tenderness   Nausea & vomiting   Ovarian cyst     Time reflects when diagnosis was documented in both MDM as applicable and the Disposition within this note     Time User Action Codes Description Comment    1/18/2019  5:56 PM Juansmsheri Daly Tyson OSCAR Add [R10 32] Abdominal pain, left lower quadrant     1/18/2019  5:57 PM Milas Sicard Add [R10 2] Left adnexal tenderness     1/18/2019  5:57 PM Milas Sicard Add [R11 2] Nausea & vomiting     1/18/2019  6:02 PM JuansmithAlejandro Add [N83 209] Ovarian cyst       ED Disposition     ED Disposition Condition Comment    Discharge  [address] discharge to home/self care  Condition at discharge: Good        Follow-up Information     Follow up With Specialties Details Why Contact Info Additional Information    [facility] Emergency Department Emergency Medicine  If symptoms worsen: worsening abdominal pain, severe pain, change in the pain, vaginal discharge, fever/chills, etc [facility] 2 ED, [address], [place], [address] Asad Jacinto MD Pediatrics Schedule an appointment as soon as possible for a visit For follow up to ensure improvement, and for further testing and treatment as needed [address]  [address] [facility]  [address] [address] [facility], [address] [facility] Gastroenterology Schedule an appointment as soon as possible for a visit for GI follow up [address] [place] [address] [address]  [address] [facility]       Juvenal Rodriguez MD Obstetrics and Gynecology, Obstetrics, Gynecology Schedule an appointment as soon as possible for a visit for OBGYN follow up for Ovarian cyst pain  [address]  [address] [place] [address] [address]             Discharge Medication List as of 1/18/2019  6:07 PM      START taking these medications    Details   !! dicyclomine (BENTYL) 20 mg tablet Take 1 tablet (20 mg total) by mouth every 6 (six) hours crampy abdominal pain, Starting Fri 1/18/2019, Print      naproxen (NAPROSYN) 500 mg tablet Take 1 tablet (500 mg total) by mouth 2 (two) times a day with meals for 10 days As needed for mild pain control and reduce inflammation in her abdomen , Starting Fri 1/18/2019, Until Mon 1/28/2019, Print      ondansetron (ZOFRAN-ODT) 4 mg disintegrating tablet Take 1 tablet (4 mg total) by mouth every 6 (six) hours as needed for nausea or vomiting, Starting Fri 1/18/2019, Print      oxyCODONE-acetaminophen (PERCOCET) 5-325 mg per tablet Take 1 tablet by mouth every 6 (six) hours as needed for severe pain for up to 5 days Max Daily Amount: 4 tablets, Starting Fri 1/18/2019, Until Wed 1/23/2019, Print       !! - Potential duplicate medications found  Please discuss with provider        CONTINUE these medications which have NOT CHANGED    Details   citalopram (CeleXA) 20 mg tablet Take 20 mg by mouth daily, Historical Med      cyclobenzaprine (FLEXERIL) 5 mg tablet Take 5 mg by mouth every 6 (six) hours as needed, Starting Thu 1/25/2018, Historical Med      DEXILANT 60 MG capsule Take 1 capsule by mouth daily, Starting Mon 1/22/2018, Historical Med      !! dicyclomine (BENTYL) 20 mg tablet Take 20 mg by mouth every 6 (six) hours, Historical Med      divalproex sodium (DEPAKOTE ER) 500 mg 24 hr tablet Take 500 mg by mouth 2 (two) times a day, Starting Mon 1/29/2018, Historical Med      divalproex sodium (DEPAKOTE) 250 mg EC tablet Take 250 mg by mouth 2 (two) times a day, Historical Med      hydrocortisone (ANUSOL-HC) 25 mg suppository Insert 1 suppository into the rectum 2 times a day , Historical Med      Lidocaine HCl 3 % CREA Apply to back 3 times a day as needed, Historical Med      Linaclotide (LINZESS) 145 MCG CAPS Take 1 capsule (145 mcg total) by mouth daily, Starting Thu 3/22/2018, Sample      loratadine (CLARITIN) 10 mg tablet Take 10 mg by mouth daily, Starting Mon 1/29/2018, Historical Med      metoclopramide (REGLAN) 5 mg tablet Take 5 mg by mouth 4 (four) times a day, Historical Med      Multiple Vitamins-Calcium (ONE-A-DAY WOMENS FORMULA PO) Take by mouth, Historical Med      !! Multiple Vitamins-Minerals (ONE-A-DAY WOMENS 50+ ADVANTAGE PO) Take 1 tablet by mouth daily  , Historical Med      !! Multiple Vitamins-Minerals (ONE-A-DAY WOMENS PETITES) TABS Take 1 tablet by mouth daily  , Historical Med      ondansetron (ZOFRAN) 4 mg tablet Take 1 tablet (4 mg total) by mouth every 8 (eight) hours as needed for nausea or vomiting, Starting u 6/21/2018, Normal      polyethylene glycol (GLYCOLAX) powder mix 17g(1 measuring cap) WITH ONE cup OF WATER OR JUICE AND drink solution DAILY, Historical Med      promethazine (PHENERGAN) 25 mg tablet Take 25 mg by mouth every 6 (six) hours as needed for nausea or vomiting, Historical Med      ranitidine (ZANTAC) 150 mg tablet Take 1 tablet (150 mg total) by mouth 2 (two) times a day, Starting Mon 1/14/2019, Normal      simethicone (MYLICON) 80 mg chewable tablet TAKE ONE TABLET BY MOUTH EVERY SIX HOURS FOR FLATULENCE, Historical Med      sucralfate (CARAFATE) 1 g tablet TAKE ONE TABLET BY MOUTH FOUR TIMES DAILY Before meals and bedtime, Historical Med       !! - Potential duplicate medications found  Please discuss with provider  No discharge procedures on file      ED Provider  Electronically Signed by           Jesus Mack DO  01/28/19 7081

## 2019-01-18 NOTE — DISCHARGE INSTRUCTIONS
Acute Nausea and Vomiting, Ambulatory Care   GENERAL INFORMATION:   Acute nausea and vomiting  starts suddenly, gets worse quickly, and lasts a short time  Nausea and vomiting may be caused by pregnancy, alcohol, infection, or medicines  Common related symptoms include the following:   · Fever    · Abdominal swelling    · Pain, tenderness, or a lump in the abdomen    · Splashing sounds heard in your stomach when you move  Seek immediate care for the following symptoms:   · Blood in your vomit or bowel movements    · Sudden, severe pain in your chest and upper abdomen after hard vomiting    · Dizziness, dry mouth, and thirst    · Urinating very little or not at all    · Muscle weakness, leg cramps, and trouble breathing    · A heart beat that is faster than normal    · Vomiting for more than 48 hours  Treatment for acute nausea and vomiting  may include medicines to calm your stomach and stop the vomiting  You may need IV fluids if you are dehydrated  Manage your nausea and vomiting:   · Drink liquids as directed to prevent dehydration  Ask how much liquid to drink each day and which liquids are best for you  You may need to drink an oral rehydration solution (ORS)  ORS contains water, salts, and sugar that are needed to replace the lost body fluids  Ask what kind of ORS to use, how much to drink, and where to get it  · Eat smaller meals, more often  Eat small amounts of food every 2 to 3 hours, even if you are not hungry  Food in your stomach may help decrease your nausea  · Avoid stress  Find ways to relax and manage your stress  Headaches due to stress may cause nausea and vomiting  Get more rest and sleep  Follow up with your healthcare provider as directed:  Write down your questions so you remember to ask them during your visits  CARE AGREEMENT:   You have the right to help plan your care  Learn about your health condition and how it may be treated   Discuss treatment options with your caregivers to decide what care you want to receive  You always have the right to refuse treatment  The above information is an  only  It is not intended as medical advice for individual conditions or treatments  Talk to your doctor, nurse or pharmacist before following any medical regimen to see if it is safe and effective for you  © 2014 [address] is for End User's use only and may not be sold, redistributed or otherwise used for commercial purposes  All illustrations and images included in CareNotes® are the copyrighted property of [employer]  or [employer]  Acute Abdominal Pain   WHAT YOU NEED TO KNOW:   The cause of your abdominal pain may not be found  If a cause is found, treatment will depend on what the cause is  DISCHARGE INSTRUCTIONS:   Return to the emergency department if:   · You vomit blood or cannot stop vomiting  · You have blood in your bowel movement or it looks like tar  · You have bleeding from your rectum  · Your abdomen is larger than usual, more painful, and hard  · You have severe pain in your abdomen  · You stop passing gas and having bowel movements  · You feel weak, dizzy, or faint  Contact your healthcare provider if:   · You have a fever  · You have new signs and symptoms  · Your symptoms do not get better with treatment  · You have questions or concerns about your condition or care  Medicines  may be given to decrease pain, treat an infection, and manage your symptoms  Take your medicine as directed  Call your healthcare provider if you think your medicine is not helping or if you have side effects  Tell him if you are allergic to any medicine  Keep a list of the medicines, vitamins, and herbs you take  Include the amounts, and when and why you take them  Bring the list or the pill bottles to follow-up visits  Carry your medicine list with you in case of an emergency    Manage your symptoms: · Apply heat  on your abdomen for 20 to 30 minutes every 2 hours for as many days as directed  Heat helps decrease pain and muscle spasms  · Manage your stress  Stress may cause abdominal pain  Your healthcare provider may recommend relaxation techniques and deep breathing exercises to help decrease your stress  Your healthcare provider may recommend you talk to someone about your stress or anxiety, such as a counselor or a trusted friend  Get plenty of sleep and exercise regularly  · Limit or do not drink alcohol  Alcohol can make your abdominal pain worse  Ask your healthcare provider if it is safe for you to drink alcohol  Also ask how much is safe for you to drink  · Do not smoke  Nicotine and other chemicals in cigarettes can damage your esophagus and stomach  Ask your healthcare provider for information if you currently smoke and need help to quit  E-cigarettes or smokeless tobacco still contain nicotine  Talk to your healthcare provider before you use these products  Make changes to the food you eat as directed:  Do not eat foods that cause abdominal pain or other symptoms  Eat small meals more often  · Eat more high-fiber foods if you are constipated  High-fiber foods include fruits, vegetables, whole-grain foods, and legumes  · Do not eat foods that cause gas if you have bloating  Examples include broccoli, cabbage, and cauliflower  Do not drink soda or carbonated drinks, because these may also cause gas  · Do not eat foods or drinks that contain sorbitol or fructose if you have diarrhea and bloating  Some examples are fruit juices, candy, jelly, and sugar-free gum  · Do not eat high-fat foods, such as fried foods, cheeseburgers, hot dogs, and desserts  · Limit or do not drink caffeine  Caffeine may make symptoms, such as heart burn or nausea, worse  · Drink plenty of liquids to prevent dehydration from diarrhea or vomiting   Ask your healthcare provider how much liquid to drink each day and which liquids are best for you  Follow up with your healthcare provider as directed:  Write down your questions so you remember to ask them during your visits  © 2017 [address] Information is for End User's use only and may not be sold, redistributed or otherwise used for commercial purposes  All illustrations and images included in CareNotes® are the copyrighted property of [employer]  or [employer]han Rivas  The above information is an  only  It is not intended as medical advice for individual conditions or treatments  Talk to your doctor, nurse or pharmacist before following any medical regimen to see if it is safe and effective for you  Ovarian Cyst   WHAT YOU NEED TO KNOW:   An ovarian cyst is a sac that grows on an ovary  This sac usually contains fluid, but may sometimes have blood or tissue in it  Most ovarian cysts are harmless and go away without treatment in a few months  Some cysts can grow large, cause pain, or break open  DISCHARGE INSTRUCTIONS:   Call 911 for any of the following:   · You are too weak or dizzy to stand up  Seek care immediately if:   · You have severe abdominal pain  The pain may be sharp and sudden  · You have a fever  Contact your healthcare provider if:   · Your periods are early, late, or more painful than usual     · You have bleeding from your vagina that is not your period  · You have abdominal pain all the time  · Your abdomen is swollen  · You have feelings of fullness, pressure, or discomfort in your abdomen  · You have trouble urinating or emptying your bladder completely  · You have pain during sex  · You are losing weight without trying  · You have questions or concerns about your condition or care  Medicines: You may need any of the following:  · NSAIDs , such as ibuprofen, help decrease swelling, pain, and fever   This medicine is available with or without a doctor's order  NSAIDs can cause stomach bleeding or kidney problems in certain people  If you take blood thinner medicine, always ask if NSAIDs are safe for you  Always read the medicine label and follow directions  Do not give these medicines to children under 10months of age without direction from your child's healthcare provider  · Birth control pills  may help to control your periods, prevent cysts, or cause them to shrink  · Take your medicine as directed  Contact your healthcare provider if you think your medicine is not helping or if you have side effects  Tell him or her if you are allergic to any medicine  Keep a list of the medicines, vitamins, and herbs you take  Include the amounts, and when and why you take them  Bring the list or the pill bottles to follow-up visits  Carry your medicine list with you in case of an emergency  Follow up with your healthcare provider as directed:  Write down your questions so you remember to ask them during your visits  Apply heat to decrease pain and cramping:  Sit in a warm bath, or place a heating pad (turned on low) or a hot water bottle on your abdomen  Do this for 15 to 20 minutes every hour for as many days as directed  © 2017 [address] [facility] Information is for End User's use only and may not be sold, redistributed or otherwise used for commercial purposes  All illustrations and images included in CareNotes® are the copyrighted property of [employer]  or [employer] Rivas  The above information is an  only  It is not intended as medical advice for individual conditions or treatments  Talk to your doctor, nurse or pharmacist before following any medical regimen to see if it is safe and effective for you  Pelvic Pain in Women   WHAT YOU NEED TO KNOW:   What do I need to know about pelvic pain? You may have pain on one or both sides of your pelvis   Pelvic pain may occur with certain body positions or activities, such as when you have sex or a bowel movement  It may worsen during your monthly period or after you sit or stand for a long time  Chronic pelvic pain is pain that continues for longer than 6 months  What causes pelvic pain in women? · Gynecologic conditions , such as pelvic inflammatory disease (PID), endometriosis, or uterine fibroids    · Bowel and bladder conditions , such as irritable bowel syndrome, bladder inflammation, or tumors     · Muscle and nerve conditions , such as swelling or weakness of your pelvic muscles, or damage to the nerves of your pelvic area (neuropathy)    · Psychological issues as a result of physical or sexual abuse, or drug abuse  How is pelvic pain treated? · Pain medicine  may be given in pills, injections, or creams to relieve your pain  · Hormones  may be given if your pain gets worse with your menstrual cycle  · Antibiotics  may be given if your pain is caused by infection  · Surgery  may be done if other treatments do not relieve your pain  How can I manage my pelvic pain? · Keep a pain diary  Write down when your pain happens, how severe it is, and any other symptoms you have with your pain  A diary will help you keep track of pain cycles  It may also help your healthcare provider find out what is causing your pain  · Learn ways to relax  Deep breathing, meditation, and relaxation techniques can help decrease your pain  When you are tense, your pain may increase  · Change the foods you eat if you have irritable bowel syndrome  Ask your healthcare provider about the best foods for you  Call 911 for any of the following:   · You have severe chest pain and sudden trouble breathing  When should I seek immediate care? · You have heavy or unusual vaginal bleeding, and you feel lightheaded or faint  When should I contact my healthcare provider? · You have pelvic pain that does not go away after you take pain medicine      · You develop new symptoms or your symptoms are worse than before  · You have questions or concerns about your condition or care  CARE AGREEMENT:   You have the right to help plan your care  Learn about your health condition and how it may be treated  Discuss treatment options with your caregivers to decide what care you want to receive  You always have the right to refuse treatment  The above information is an  only  It is not intended as medical advice for individual conditions or treatments  Talk to your doctor, nurse or pharmacist before following any medical regimen to see if it is safe and effective for you  © 2017 [address] Information is for End User's use only and may not be sold, redistributed or otherwise used for commercial purposes  All illustrations and images included in CareNotes® are the copyrighted property of [employer]  or [employer]han Rivas  Round Ligament Pain   WHAT YOU NEED TO KNOW:   What is round ligament pain? Round ligament pain is caused when ligaments are stretched as your uterus (womb) gets bigger during pregnancy  Round ligaments are found on each side of your uterus  They are bands of tissue that hold the uterus in place  Round ligament pain happens most often during the second trimester  It is a normal part of pregnancy and should stop by the third trimester  The pain is not serious and will not hurt your baby  What are the signs and symptoms of round ligament pain? · Pain on one or both sides of your lower abdomen or groin that may move up to your hip    · Spasms in the muscles in your abdomen    · Pain that lasts a few seconds    · Pain that happens when you exercise, sneeze, change positions, or stand quickly  How is round ligament pain diagnosed? Your healthcare provider will examine you and ask about your pain  Tell the provider when the pain started, and if you feel it on one or both sides   Your provider may ask if anything helps the pain or makes it worse  What can I do to manage my pain? Round ligament pain does not need to be treated  The following may help make you more comfortable:  · Rest as often as you can  Rest can help relieve round ligament pain  You might want to lie on the side that has pain  Place a pillow under your abdomen  Keep another pillow between your knees  · Move slowly  Sudden movement can stretch the ligaments and cause pain  Stand, sit, and change positions slowly  Try to tighten the muscles in your hips before you sneeze or laugh  You can also sit down and bring your knees up toward your abdomen  This can help relieve tension on the ligaments  · Exercise as directed  Gentle exercise can keep the ligaments loose and strengthen core (abdominal) muscles  An example is swimming, or a yoga program designed for pregnancy  Ask your healthcare provider which exercises are safe for you and how often to exercise  For most healthy women, a good goal is to try to get at least 30 minutes of exercise every day  If activity causes pain, try not to walk too long or too far at one time  Break your exercise up into short amounts  · Apply a warm compress to the area  Warmth can relieve pain and muscle spasms  Ask your healthcare provider if you can take a warm bath or use a heating pad  Keep all heat settings low  High heat can be dangerous for your baby  Do not sit in a hot tub or use hot water in your bath  You may also be able to massage the area gently while you are applying heat  Massage can help relieve pain  · Ask about pain medicines  Ask your healthcare provider before you take any medicine during pregnancy, including over-the-counter pain medicines  Your healthcare provider may recommend acetaminophen to relieve the pain  Ask how much to take and how often to take it  Follow directions  Acetaminophen can cause liver damage  Too much medicine can be harmful to your baby  When should I contact my healthcare provider? · You have pain that is spreading to other parts of your body  · You have new or worsening pain  · You have pain that lasts longer than a few minutes at a time  · You have questions or concerns about your condition or care  CARE AGREEMENT:   You have the right to help plan your care  Learn about your health condition and how it may be treated  Discuss treatment options with your caregivers to decide what care you want to receive  You always have the right to refuse treatment  The above information is an  only  It is not intended as medical advice for individual conditions or treatments  Talk to your doctor, nurse or pharmacist before following any medical regimen to see if it is safe and effective for you  © 2017 [address] Information is for End User's use only and may not be sold, redistributed or otherwise used for commercial purposes  All illustrations and images included in CareNotes® are the copyrighted property of [employer]  or [employer]han Rivas

## 2019-01-18 NOTE — TELEPHONE ENCOUNTER
Pt called she was in the hospital last night  She isnt feeling well  shes vomiting and the nausea medication isnt helping her  Can you please call the pt

## 2019-01-20 LAB
C TRACH DNA SPEC QL NAA+PROBE: NEGATIVE
N GONORRHOEA DNA SPEC QL NAA+PROBE: NEGATIVE

## 2019-01-22 NOTE — TELEPHONE ENCOUNTER
Hydrocortisone was formulary and just needed to be rerun by the pharmacy and they were picked up on 01/14/19    dexilant was approved for one year   Date range:  01/18/19 til 01/18/20    Called and spoke to [employer] at mention they will be calling patient when it's ready for   Co-pay $3

## 2019-05-03 ENCOUNTER — TELEPHONE (OUTPATIENT)
Dept: GASTROENTEROLOGY | Facility: CLINIC | Age: 49
End: 2019-05-03

## 2019-05-03 DIAGNOSIS — K21.9 GASTROESOPHAGEAL REFLUX DISEASE, ESOPHAGITIS PRESENCE NOT SPECIFIED: Primary | ICD-10-CM

## 2019-05-08 DIAGNOSIS — K21.9 GASTROESOPHAGEAL REFLUX DISEASE, ESOPHAGITIS PRESENCE NOT SPECIFIED: Primary | ICD-10-CM

## 2019-05-08 RX ORDER — OMEPRAZOLE 20 MG/1
20 CAPSULE, DELAYED RELEASE ORAL DAILY
Refills: 0 | COMMUNITY
Start: 2019-04-02 | End: 2019-05-08 | Stop reason: SDUPTHER

## 2019-05-08 RX ORDER — OMEPRAZOLE 20 MG/1
20 CAPSULE, DELAYED RELEASE ORAL DAILY
Qty: 90 CAPSULE | Refills: 3 | Status: SHIPPED | OUTPATIENT
Start: 2019-05-08 | End: 2020-07-15

## 2019-05-13 ENCOUNTER — TELEPHONE (OUTPATIENT)
Dept: GASTROENTEROLOGY | Facility: CLINIC | Age: 49
End: 2019-05-13

## 2019-11-02 ENCOUNTER — APPOINTMENT (EMERGENCY)
Dept: RADIOLOGY | Facility: HOSPITAL | Age: 49
End: 2019-11-02
Payer: COMMERCIAL

## 2019-11-02 ENCOUNTER — HOSPITAL ENCOUNTER (EMERGENCY)
Facility: HOSPITAL | Age: 49
Discharge: HOME | End: 2019-11-02
Attending: EMERGENCY MEDICINE | Admitting: EMERGENCY MEDICINE
Payer: COMMERCIAL

## 2019-11-02 VITALS
DIASTOLIC BLOOD PRESSURE: 62 MMHG | HEART RATE: 94 BPM | TEMPERATURE: 97.8 F | SYSTOLIC BLOOD PRESSURE: 124 MMHG | RESPIRATION RATE: 18 BRPM | OXYGEN SATURATION: 98 %

## 2019-11-02 DIAGNOSIS — R07.81 PLEURITIC CHEST PAIN: Primary | ICD-10-CM

## 2019-11-02 LAB
ALBUMIN SERPL BCP-MCNC: 3.5 G/DL (ref 3.5–5)
ALP SERPL-CCNC: 65 U/L (ref 46–116)
ALT SERPL W P-5'-P-CCNC: 13 U/L (ref 12–78)
ANION GAP SERPL CALCULATED.3IONS-SCNC: 9 MMOL/L (ref 4–13)
APTT PPP: 29 SECONDS (ref 23–37)
AST SERPL W P-5'-P-CCNC: 11 U/L (ref 5–45)
BASOPHILS # BLD AUTO: 0.05 THOUSANDS/ΜL (ref 0–0.1)
BASOPHILS NFR BLD AUTO: 1 % (ref 0–1)
BILIRUB DIRECT SERPL-MCNC: 0.04 MG/DL (ref 0–0.2)
BILIRUB SERPL-MCNC: 0.2 MG/DL (ref 0.2–1)
BUN SERPL-MCNC: 17 MG/DL (ref 5–25)
CALCIUM SERPL-MCNC: 8.3 MG/DL (ref 8.3–10.1)
CHLORIDE SERPL-SCNC: 107 MMOL/L (ref 100–108)
CO2 SERPL-SCNC: 26 MMOL/L (ref 21–32)
CREAT SERPL-MCNC: 0.91 MG/DL (ref 0.6–1.3)
D DIMER PPP FEU-MCNC: <0.27 UG/ML FEU
EOSINOPHIL # BLD AUTO: 0.16 THOUSAND/ΜL (ref 0–0.61)
EOSINOPHIL NFR BLD AUTO: 2 % (ref 0–6)
ERYTHROCYTE [DISTWIDTH] IN BLOOD BY AUTOMATED COUNT: 12 % (ref 11.6–15.1)
GFR SERPL CREATININE-BSD FRML MDRD: 75 ML/MIN/1.73SQ M
GLUCOSE SERPL-MCNC: 109 MG/DL (ref 65–140)
HCT VFR BLD AUTO: 43.5 % (ref 34.8–46.1)
HGB BLD-MCNC: 14.6 G/DL (ref 11.5–15.4)
HOLD SPECIMEN: NORMAL
IMM GRANULOCYTES # BLD AUTO: 0.01 THOUSAND/UL (ref 0–0.2)
IMM GRANULOCYTES NFR BLD AUTO: 0 % (ref 0–2)
INR PPP: 0.98 (ref 0.84–1.19)
LYMPHOCYTES # BLD AUTO: 2.61 THOUSANDS/ΜL (ref 0.6–4.47)
LYMPHOCYTES NFR BLD AUTO: 37 % (ref 14–44)
MCH RBC QN AUTO: 33.9 PG (ref 26.8–34.3)
MCHC RBC AUTO-ENTMCNC: 33.6 G/DL (ref 31.4–37.4)
MCV RBC AUTO: 101 FL (ref 82–98)
MONOCYTES # BLD AUTO: 0.79 THOUSAND/ΜL (ref 0.17–1.22)
MONOCYTES NFR BLD AUTO: 11 % (ref 4–12)
NEUTROPHILS # BLD AUTO: 3.49 THOUSANDS/ΜL (ref 1.85–7.62)
NEUTS SEG NFR BLD AUTO: 49 % (ref 43–75)
NRBC BLD AUTO-RTO: 0 /100 WBCS
PLATELET # BLD AUTO: 304 THOUSANDS/UL (ref 149–390)
PMV BLD AUTO: 9.2 FL (ref 8.9–12.7)
POTASSIUM SERPL-SCNC: 3.8 MMOL/L (ref 3.5–5.3)
PROT SERPL-MCNC: 6.7 G/DL (ref 6.4–8.2)
PROTHROMBIN TIME: 13 SECONDS (ref 11.6–14.5)
RBC # BLD AUTO: 4.31 MILLION/UL (ref 3.81–5.12)
SODIUM SERPL-SCNC: 142 MMOL/L (ref 136–145)
TROPONIN I SERPL-MCNC: <0.02 NG/ML
WBC # BLD AUTO: 7.11 THOUSAND/UL (ref 4.31–10.16)

## 2019-11-02 PROCEDURE — 85610 PROTHROMBIN TIME: CPT | Performed by: EMERGENCY MEDICINE

## 2019-11-02 PROCEDURE — 93005 ELECTROCARDIOGRAM TRACING: CPT

## 2019-11-02 PROCEDURE — 84484 ASSAY OF TROPONIN QUANT: CPT | Performed by: EMERGENCY MEDICINE

## 2019-11-02 PROCEDURE — 71046 X-RAY EXAM CHEST 2 VIEWS: CPT

## 2019-11-02 PROCEDURE — 85730 THROMBOPLASTIN TIME PARTIAL: CPT | Performed by: EMERGENCY MEDICINE

## 2019-11-02 PROCEDURE — 36415 COLL VENOUS BLD VENIPUNCTURE: CPT

## 2019-11-02 PROCEDURE — 80076 HEPATIC FUNCTION PANEL: CPT | Performed by: EMERGENCY MEDICINE

## 2019-11-02 PROCEDURE — 99285 EMERGENCY DEPT VISIT HI MDM: CPT

## 2019-11-02 PROCEDURE — 99284 EMERGENCY DEPT VISIT MOD MDM: CPT | Performed by: EMERGENCY MEDICINE

## 2019-11-02 PROCEDURE — 85025 COMPLETE CBC W/AUTO DIFF WBC: CPT | Performed by: EMERGENCY MEDICINE

## 2019-11-02 PROCEDURE — 85379 FIBRIN DEGRADATION QUANT: CPT | Performed by: EMERGENCY MEDICINE

## 2019-11-02 PROCEDURE — 80048 BASIC METABOLIC PNL TOTAL CA: CPT | Performed by: EMERGENCY MEDICINE

## 2019-11-02 NOTE — ED NOTES
Lab called to add on troponin; unable to modify order from order set      Leena Mendieta RN  11/02/19 1544

## 2019-11-02 NOTE — ED PROVIDER NOTES
History  Chief Complaint   Patient presents with    Chest Pain     patient presents with right sided chest pain that radiates to her back; pain increases with deep breathing  patient also with b/l leg pain      Pt comes to ED w approx 15 hours of intermittent R sided pleuritic chest pain that goes into her back  She denies dyspnea, diaphoresis, syncope, weakness, hemoptysis and h/o CAD  She denies exertional worsening of her sxs  Currently having the pain when she breathes  No recent trauma  No fever  No cough  No leg pain or swelling  No h/o vte  No recent surgery or immobilization  Prior to Admission Medications   Prescriptions Last Dose Informant Patient Reported? Taking? DEXILANT 60 MG capsule   No Yes   Sig: Take 1 capsule (60 mg total) by mouth daily for 360 days   Lidocaine HCl 3 % CREA  Self Yes No   Sig: Apply to back 3 times a day as needed   Linaclotide (LINZESS) 145 MCG CAPS  Self No No   Sig: Take 1 capsule (145 mcg total) by mouth daily   Multiple Vitamin (ONE-A-DAY ESSENTIAL PO)   Yes No   Sig: Take 1 tablet by mouth daily  Multiple Vitamins-Calcium (ONE-A-DAY WOMENS FORMULA PO)  Self Yes No   Sig: Take by mouth   Multiple Vitamins-Calcium (ONE-A-DAY WOMENS PO)   Yes Yes   Sig: Take 1 tablet by mouth daily  Multiple Vitamins-Minerals (ONE-A-DAY WOMENS 50+ ADVANTAGE PO)  Self Yes Yes   Sig: Take 1 tablet by mouth daily  Multiple Vitamins-Minerals (ONE-A-DAY WOMENS PETITES) TABS   Yes Yes   Sig: Take 1 tablet by mouth daily  NIX CREME RINSE 1 % liquid   Yes No   Sig: Apply topically one time for 1 dose   Reapply in one week   bethanechol (URECHOLINE) 25 mg tablet   Yes No   Sig: Take 25 mg by mouth 4 (four) times a day   ciprofloxacin (CIPRO) 500 mg tablet   Yes No   Sig: Take 500 mg by mouth 2 (two) times a day   citalopram (CeleXA) 20 mg tablet  Self Yes Yes   Sig: Take 20 mg by mouth daily   cyclobenzaprine (FLEXERIL) 5 mg tablet  Self Yes Yes   Sig: Take 5 mg by mouth every 6 (six) hours as needed   dicyclomine (BENTYL) 20 mg tablet  Self Yes Yes   Sig: Take 20 mg by mouth every 6 (six) hours   dicyclomine (BENTYL) 20 mg tablet   No No   Sig: Take 1 tablet (20 mg total) by mouth every 6 (six) hours crampy abdominal pain   divalproex sodium (DEPAKOTE ER) 500 mg 24 hr tablet  Self Yes Yes   Sig: Take 500 mg by mouth 2 (two) times a day   famotidine (PEPCID) 20 mg tablet   Yes Yes   Sig: TAKE ONE TABLET BY MOUTH TWICE DAILY   gabapentin (NEURONTIN) 100 mg capsule   Yes Yes   Sig: Take one (1) capsule by mouth twice a day   hydrocortisone (ANUSOL-HC) 25 mg suppository  Self Yes Yes   Sig: Insert 1 suppository into the rectum 2 times a day  ibuprofen (MOTRIN) 600 mg tablet   Yes No   Sig: TAKE ONE TABLET BY MOUTH EVERY 6 HOURS AS NEEDED FOR mild PAIN, DO not TAKE WITH voltaren   lithium 300 MG tablet   Yes No   Sig: Take by mouth   loratadine (CLARITIN) 10 mg tablet  Self Yes No   Sig: Take 10 mg by mouth daily   methocarbamol (ROBAXIN) 500 mg tablet   Yes No   Sig: Take 500 mg by mouth 4 (four) times a day as needed   metoclopramide (REGLAN) 5 mg tablet  Self Yes No   Sig: Take 5 mg by mouth 4 (four) times a day   multivitamin (THERAGRAN) TABS   Yes Yes   Sig: Take 1 tablet by mouth daily  naproxen (NAPROSYN) 500 mg tablet   No No   Sig: Take 1 tablet (500 mg total) by mouth 2 (two) times a day with meals for 10 days As needed for mild pain control and reduce inflammation in her abdomen  omeprazole (PriLOSEC) 20 mg delayed release capsule   No No   Sig: Take 1 capsule (20 mg total) by mouth daily   ondansetron (ZOFRAN) 4 mg tablet   No Yes   Sig: Take 1 tablet (4 mg total) by mouth every 8 (eight) hours as needed for nausea or vomiting   permethrin (ELIMITE) 5 % cream   Yes Yes   Sig: Apply topically one time for 1 dose     promethazine (PHENERGAN) 25 mg tablet  Self Yes Yes   Sig: Take 25 mg by mouth every 6 (six) hours as needed for nausea or vomiting   ranitidine (ZANTAC) 150 mg tablet   No Yes   Sig: Take 1 tablet (150 mg total) by mouth 2 (two) times a day   simethicone (MYLICON) 80 mg chewable tablet  Self Yes No   Sig: TAKE ONE TABLET BY MOUTH EVERY SIX HOURS FOR FLATULENCE   sucralfate (CARAFATE) 1 g tablet  Self Yes Yes   Sig: TAKE ONE TABLET BY MOUTH FOUR TIMES DAILY Before meals and bedtime      Facility-Administered Medications: None       Past Medical History:   Diagnosis Date    GERD (gastroesophageal reflux disease)     Psychiatric disorder        Past Surgical History:   Procedure Laterality Date    CHOLECYSTECTOMY      TUBAL LIGATION         History reviewed  No pertinent family history  I have reviewed and agree with the history as documented  Social History     Tobacco Use    Smoking status: Current Every Day Smoker     Types: Cigarettes    Smokeless tobacco: Never Used   Substance Use Topics    Alcohol use: No    Drug use: No        Review of Systems   Constitutional: Negative for chills and fever  Respiratory: Positive for chest tightness  Negative for shortness of breath  Cardiovascular: Positive for chest pain  All other systems reviewed and are negative  Physical Exam  Physical Exam   Constitutional: She is oriented to person, place, and time  She appears well-developed and well-nourished  No distress  HENT:   Head: Normocephalic and atraumatic  Eyes: Pupils are equal, round, and reactive to light  Conjunctivae and EOM are normal    Neck: Normal range of motion  Neck supple  No JVD present  Cardiovascular: Normal rate, regular rhythm, normal heart sounds and intact distal pulses  Pulmonary/Chest: Effort normal and breath sounds normal  No stridor  Abdominal: Soft  She exhibits no distension  There is no tenderness  There is no rebound and no guarding  Musculoskeletal: Normal range of motion  She exhibits no edema, tenderness or deformity  Neurological: She is alert and oriented to person, place, and time   No cranial nerve deficit or sensory deficit  She exhibits normal muscle tone  Coordination normal    Skin: Skin is warm and dry  Capillary refill takes less than 2 seconds  No rash noted  She is not diaphoretic  No erythema  No pallor  Nursing note and vitals reviewed  Vital Signs  ED Triage Vitals [11/02/19 1753]   Temperature Pulse Respirations Blood Pressure SpO2   97 8 °F (36 6 °C) (!) 108 18 127/72 99 %      Temp Source Heart Rate Source Patient Position - Orthostatic VS BP Location FiO2 (%)   Oral Monitor Sitting Right arm --      Pain Score       9           Vitals:    11/02/19 1753 11/02/19 1815 11/02/19 1902   BP: 127/72 117/68 124/62   Pulse: (!) 108 96 94   Patient Position - Orthostatic VS: Sitting Sitting Sitting         Visual Acuity      ED Medications  Medications - No data to display    Diagnostic Studies  Results Reviewed     Procedure Component Value Units Date/Time    [place] draw [[id number]] Collected:  11/02/19 1808    Lab Status:  Final result Specimen:  Blood from Arm, Right Updated:  11/02/19 2001    Narrative: The following orders were created for panel order [place] draw  Procedure                               Abnormality         Status                     ---------                               -----------         ------                     Beula Franck Top on LLQT[[id number]]                           Final result               Green / Yellow tube on AIPF[[id number]]                      Final result               Green / Black tube on GBPI[[id number]]                       Final result               Lavender Top 3 ml on VQWO[[id number]]                        Final result                 Please view results for these tests on the individual orders      Hepatic function panel [[id number]]  (Normal) Collected:  11/02/19 1808    Lab Status:  Final result Specimen:  Blood from Arm, Right Updated:  11/02/19 1855     Total Bilirubin 0 20 mg/dL      Bilirubin, Direct 0 04 mg/dL      Alkaline Phosphatase 65 U/L AST 11 U/L      ALT 13 U/L      Total Protein 6 7 g/dL      Albumin 3 5 g/dL     Troponin I [[id number]]  (Normal) Collected:  11/02/19 1808    Lab Status:  Final result Specimen:  Blood from Arm, Right Updated:  11/02/19 1837     Troponin I <0 02 ng/mL     Basic metabolic panel [[id number]] Collected:  11/02/19 1808    Lab Status:  Final result Specimen:  Blood from Arm, Right Updated:  11/02/19 1836     Sodium 142 mmol/L      Potassium 3 8 mmol/L      Chloride 107 mmol/L      CO2 26 mmol/L      ANION GAP 9 mmol/L      BUN 17 mg/dL      Creatinine 0 91 mg/dL      Glucose 109 mg/dL      Calcium 8 3 mg/dL      eGFR 75 ml/min/1 73sq m     Narrative:       [place] guidelines for Chronic Kidney Disease (CKD):     Stage 1 with normal or high GFR (GFR > 90 mL/min/1 73 square meters)    Stage 2 Mild CKD (GFR = 60-89 mL/min/1 73 square meters)    Stage 3A Moderate CKD (GFR = 45-59 mL/min/1 73 square meters)    Stage 3B Moderate CKD (GFR = 30-44 mL/min/1 73 square meters)    Stage 4 Severe CKD (GFR = 15-29 mL/min/1 73 square meters)    Stage 5 End Stage CKD (GFR <15 mL/min/1 73 square meters)  Note: GFR calculation is accurate only with a steady state creatinine    D-Dimer [[id number]]  (Normal) Collected:  11/02/19 1808    Lab Status:  Final result Specimen:  Blood from Arm, Right Updated:  11/02/19 1833     D-Dimer, Quant <0 27 ug/ml FEU     APTT [[id number]]  (Normal) Collected:  11/02/19 1808    Lab Status:  Final result Specimen:  Blood from Arm, Right Updated:  11/02/19 1832     PTT 29 seconds     Protime-INR [[id number]]  (Normal) Collected:  11/02/19 1808    Lab Status:  Final result Specimen:  Blood from Arm, Right Updated:  11/02/19 1832     Protime 13 0 seconds      INR 0 98    CBC and differential [[id number]]  (Abnormal) Collected:  11/02/19 1808    Lab Status:  Final result Specimen:  Blood from Arm, Right Updated:  11/02/19 1823     WBC 7 11 Thousand/uL      RBC 4 31 Million/uL Hemoglobin 14 6 g/dL      Hematocrit 43 5 %       fL      MCH 33 9 pg      MCHC 33 6 g/dL      RDW 12 0 %      MPV 9 2 fL      Platelets 542 Thousands/uL      nRBC 0 /100 WBCs      Neutrophils Relative 49 %      Immat GRANS % 0 %      Lymphocytes Relative 37 %      Monocytes Relative 11 %      Eosinophils Relative 2 %      Basophils Relative 1 %      Neutrophils Absolute 3 49 Thousands/µL      Immature Grans Absolute 0 01 Thousand/uL      Lymphocytes Absolute 2 61 Thousands/µL      Monocytes Absolute 0 79 Thousand/µL      Eosinophils Absolute 0 16 Thousand/µL      Basophils Absolute 0 05 Thousands/µL                  XR chest 2 views   ED Interpretation by Amaya Barber MD (11/02 1847)   No acute process  Procedures  ECG 12 Lead Documentation Only  Date/Time: 11/2/2019 6:15 PM  Performed by: Amaya Barber MD  Authorized by: Amaya Barber MD     Indications / Diagnosis:  CP  ECG reviewed by me, the ED Provider: yes    Patient location:  ED  Previous ECG:     Previous ECG:  Compared to current    Similarity:  No change  Interpretation:     Interpretation: normal    Rate:     ECG rate:  100  Comments:      SR, RBBB, no significant change from prior ekg              ED Course         HEART Risk Score      Most Recent Value   History  0 Filed at: 11/02/2019 1836   ECG  0 Filed at: 11/02/2019 1836   Age  1 Filed at: 11/02/2019 1836   Risk Factors  0 Filed at: 11/02/2019 1836   Troponin  0 Filed at: 11/02/2019 1836   Heart Score Risk Calculator   History  0 Filed at: 11/02/2019 1836   ECG  0 Filed at: 11/02/2019 1836   Age  1 Filed at: 11/02/2019 1836   Risk Factors  0 Filed at: 11/02/2019 1836   Troponin  0 Filed at: 11/02/2019 1836   HEART Score  1 Filed at: 11/02/2019 1836   HEART Score  1 Filed at: 11/02/2019 1836                            MDM    Disposition  Final diagnoses:   Pleuritic chest pain     Time reflects when diagnosis was documented in both MDM as applicable and the Disposition within this note     Time User Action Codes Description Comment    11/2/2019  6:47 PM Rozelle Peal Add [R09 1] Pleurisy     11/2/2019 10:17 PM Karmen Gupta Late Remove [R09 1] Pleurisy     11/2/2019 10:18 PM Rozelle Peal Add [R07 81] Pleuritic chest pain       ED Disposition     ED Disposition Condition Date/Time Comment    Discharge Stable Sat Nov 2, 2019  6:47 PM [facility] PLANO discharge to home/self care  Follow-up Information     Follow up With Specialties Details Why Contact Info Additional Information    [address] [facility] Emergency Department Emergency Medicine  If symptoms worsen [address] [facility] [address] ED, [address] [facility], [facility], [address] [place] [address], Former[facility]          Discharge Medication List as of 11/2/2019  6:47 PM      CONTINUE these medications which have NOT CHANGED    Details   citalopram (CeleXA) 20 mg tablet Take 20 mg by mouth daily, Historical Med      cyclobenzaprine (FLEXERIL) 5 mg tablet Take 5 mg by mouth every 6 (six) hours as needed, Starting Thu 1/25/2018, Historical Med      DEXILANT 60 MG capsule Take 1 capsule (60 mg total) by mouth daily for 360 days, Starting Fri 5/3/2019, Until Mon 4/27/2020, Normal      !! dicyclomine (BENTYL) 20 mg tablet Take 20 mg by mouth every 6 (six) hours, Historical Med      divalproex sodium (DEPAKOTE ER) 500 mg 24 hr tablet Take 500 mg by mouth 2 (two) times a day, Starting Mon 1/29/2018, Historical Med      famotidine (PEPCID) 20 mg tablet TAKE ONE TABLET BY MOUTH TWICE DAILY, Historical Med      gabapentin (NEURONTIN) 100 mg capsule Take one (1) capsule by mouth twice a day, Historical Med      hydrocortisone (ANUSOL-HC) 25 mg suppository Insert 1 suppository into the rectum 2 times a day , Historical Med      !! Multiple Vitamins-Calcium (ONE-A-DAY WOMENS PO) Take 1 tablet by mouth daily  , Historical Med      !! Multiple Vitamins-Minerals (ONE-A-DAY WOMENS 50+ ADVANTAGE PO) Take 1 tablet by mouth daily  , Historical Med      !! Multiple Vitamins-Minerals (ONE-A-DAY WOMENS PETITES) TABS Take 1 tablet by mouth daily  , Historical Med      !! multivitamin (THERAGRAN) TABS Take 1 tablet by mouth daily  , Historical Med      ondansetron (ZOFRAN) 4 mg tablet Take 1 tablet (4 mg total) by mouth every 8 (eight) hours as needed for nausea or vomiting, Starting Thu 6/21/2018, Normal      permethrin (ELIMITE) 5 % cream Apply topically one time for 1 dose , Historical Med      promethazine (PHENERGAN) 25 mg tablet Take 25 mg by mouth every 6 (six) hours as needed for nausea or vomiting, Historical Med      ranitidine (ZANTAC) 150 mg tablet Take 1 tablet (150 mg total) by mouth 2 (two) times a day, Starting Mon 1/14/2019, Normal      sucralfate (CARAFATE) 1 g tablet TAKE ONE TABLET BY MOUTH FOUR TIMES DAILY Before meals and bedtime, Historical Med      bethanechol (URECHOLINE) 25 mg tablet Take 25 mg by mouth 4 (four) times a day, Starting Wed 1/16/2019, Historical Med      ciprofloxacin (CIPRO) 500 mg tablet Take 500 mg by mouth 2 (two) times a day, Starting Tue 1/15/2019, Historical Med      !! dicyclomine (BENTYL) 20 mg tablet Take 1 tablet (20 mg total) by mouth every 6 (six) hours crampy abdominal pain, Starting Fri 1/18/2019, Print      ibuprofen (MOTRIN) 600 mg tablet TAKE ONE TABLET BY MOUTH EVERY 6 HOURS AS NEEDED FOR mild PAIN, DO not TAKE WITH voltaren, Historical Med      Lidocaine HCl 3 % CREA Apply to back 3 times a day as needed, Historical Med      Linaclotide (LINZESS) 145 MCG CAPS Take 1 capsule (145 mcg total) by mouth daily, Starting Thu 3/22/2018, Sample      lithium 300 MG tablet Take by mouth, Historical Med      loratadine (CLARITIN) 10 mg tablet Take 10 mg by mouth daily, Starting Mon 1/29/2018, Historical Med      methocarbamol (ROBAXIN) 500 mg tablet Take 500 mg by mouth 4 (four) times a day as needed, Starting Mon 11/12/2018, Historical Med      metoclopramide (REGLAN) 5 mg tablet Take 5 mg by mouth 4 (four) times a day, Historical Med      !! Multiple Vitamin (ONE-A-DAY ESSENTIAL PO) Take 1 tablet by mouth daily  , Historical Med      !! Multiple Vitamins-Calcium (ONE-A-DAY WOMENS FORMULA PO) Take by mouth, Historical Med      naproxen (NAPROSYN) 500 mg tablet Take 1 tablet (500 mg total) by mouth 2 (two) times a day with meals for 10 days As needed for mild pain control and reduce inflammation in her abdomen , Starting Fri 1/18/2019, Until Mon 1/28/2019, Print      NIX CREME RINSE 1 % liquid Apply topically one time for 1 dose  Reapply in one week, Historical Med      omeprazole (PriLOSEC) 20 mg delayed release capsule Take 1 capsule (20 mg total) by mouth daily, Starting Wed 5/8/2019, Normal      simethicone (MYLICON) 80 mg chewable tablet TAKE ONE TABLET BY MOUTH EVERY SIX HOURS FOR FLATULENCE, Historical Med       !! - Potential duplicate medications found  Please discuss with provider  No discharge procedures on file      ED Provider  Electronically Signed by           Scott Calderon MD  11/02/19 3231

## 2019-11-04 LAB
ATRIAL RATE: 100 BPM
P AXIS: 75 DEGREES
PR INTERVAL: 206 MS
QRS AXIS: 85 DEGREES
QRSD INTERVAL: 136 MS
QT INTERVAL: 368 MS
QTC INTERVAL: 474 MS
T WAVE AXIS: 52 DEGREES
VENTRICULAR RATE: 100 BPM

## 2019-11-04 PROCEDURE — 93010 ELECTROCARDIOGRAM REPORT: CPT | Performed by: INTERNAL MEDICINE

## 2020-07-08 ENCOUNTER — TELEPHONE (OUTPATIENT)
Dept: GASTROENTEROLOGY | Facility: CLINIC | Age: 50
End: 2020-07-08

## 2020-07-08 NOTE — TELEPHONE ENCOUNTER
----- Message from Una Vega PA-C sent at 7/8/2020  8:58 AM EDT -----  Please let her know this was negative

## 2020-07-13 RX ORDER — LORAZEPAM 1 MG/1
TABLET ORAL DAILY
COMMUNITY
Start: 2015-10-07 | End: 2020-07-15

## 2020-07-13 RX ORDER — FLUTICASONE PROPIONATE 44 UG/1
2 AEROSOL, METERED RESPIRATORY (INHALATION) 2 TIMES DAILY
COMMUNITY
Start: 2020-07-02 | End: 2021-07-02

## 2020-07-13 RX ORDER — TRAZODONE HYDROCHLORIDE 50 MG/1
50 TABLET ORAL
COMMUNITY

## 2020-07-15 ENCOUNTER — OFFICE VISIT (OUTPATIENT)
Dept: GASTROENTEROLOGY | Facility: CLINIC | Age: 50
End: 2020-07-15
Payer: COMMERCIAL

## 2020-07-15 ENCOUNTER — PREP FOR PROCEDURE (OUTPATIENT)
Dept: GASTROENTEROLOGY | Facility: CLINIC | Age: 50
End: 2020-07-15

## 2020-07-15 VITALS
HEIGHT: 67 IN | TEMPERATURE: 97.7 F | HEART RATE: 97 BPM | BODY MASS INDEX: 25.08 KG/M2 | WEIGHT: 159.8 LBS | DIASTOLIC BLOOD PRESSURE: 80 MMHG | SYSTOLIC BLOOD PRESSURE: 122 MMHG

## 2020-07-15 DIAGNOSIS — Z20.822 ENCOUNTER FOR LABORATORY TESTING FOR COVID-19 VIRUS: ICD-10-CM

## 2020-07-15 DIAGNOSIS — K21.9 GASTROESOPHAGEAL REFLUX DISEASE, ESOPHAGITIS PRESENCE NOT SPECIFIED: Primary | ICD-10-CM

## 2020-07-15 PROCEDURE — 99203 OFFICE O/P NEW LOW 30 MIN: CPT | Performed by: PHYSICIAN ASSISTANT

## 2020-07-15 PROCEDURE — U0003 INFECTIOUS AGENT DETECTION BY NUCLEIC ACID (DNA OR RNA); SEVERE ACUTE RESPIRATORY SYNDROME CORONAVIRUS 2 (SARS-COV-2) (CORONAVIRUS DISEASE [COVID-19]), AMPLIFIED PROBE TECHNIQUE, MAKING USE OF HIGH THROUGHPUT TECHNOLOGIES AS DESCRIBED BY CMS-2020-01-R: HCPCS

## 2020-07-15 RX ORDER — CLONIDINE HYDROCHLORIDE 0.2 MG/1
0.2 TABLET ORAL 2 TIMES DAILY
COMMUNITY
End: 2020-07-23

## 2020-07-15 NOTE — PROGRESS NOTES
[facility] - Outpatient Consultation  Bailey Ledesma 52 y o  female MRN: [id number]  Encounter: [id number]          ASSESSMENT AND PLAN:      1  Gastroesophageal reflux disease, esophagitis presence not specified  Chronic with exacerbation  Stop Omeprazole; Start Dexilant 60mg daily  Continue Famotidine BID  Stop Carafate as she has been on this long term and it is not helping  Will repeat EGD  Dietary and lifestyle modifications reviewed    We could consider a GERD surgery but I am concerned she would not tolerate a manometry/24hr pH and she has multiple psychiatric issues with minimal home support    ______________________________________________________________________    HPI:  57-year-old female with GERD presents for evaluation of exacerbation of symptoms  She reports that over the past few weeks she has been having increasing issues with vomiting, regurgitation, chest pain, cough, epigastric and right upper quadrant pain  She reports that recently she has been unable to eat or drink a most anything without immediate regurgitation  She has been taking omeprazole 20 mg daily, famotidine 20 mg twice daily and Carafate 1 g twice daily with no relief  She denies any dysphagia, hematemesis or melena  Her weight has been stable  She is treated for depression and anxiety which she admits is not well controlled at present  She admits to a lot of ongoing life stressors  Her last upper endoscopy was in 2018 reported as normal   She denies drinking any alcohol or taking NSAIDs  In the past she has been treated with Reglan, bethanechol, pantoprazole, lansoprazole, Dexilant, Zantac, Carafate, Zofran, Phenergan none of which have ever effectively controlled her symptoms  The she had a recent ultrasound which was reported as normal   She is status post cholecystectomy  REVIEW OF SYSTEMS:    CONSTITUTIONAL: Denies any fever, chills, rigors, and weight loss    HEENT: No earache or tinnitus  Denies hearing loss or visual disturbances  CARDIOVASCULAR: No chest pain or palpitations  RESPIRATORY: Denies any cough, hemoptysis, shortness of breath or dyspnea on exertion  GASTROINTESTINAL: As noted in the History of Present Illness  GENITOURINARY: No problems with urination  Denies any hematuria or dysuria  NEUROLOGIC: No dizziness or vertigo, denies headaches  MUSCULOSKELETAL: Denies any muscle or joint pain  SKIN: Denies skin rashes or itching  ENDOCRINE: Denies excessive thirst  Denies intolerance to heat or cold  PSYCHOSOCIAL: Denies depression or anxiety  Denies any recent memory loss  Historical Information   Past Medical History:   Diagnosis Date    GERD (gastroesophageal reflux disease)     Psychiatric disorder      Past Surgical History:   Procedure Laterality Date    CHOLECYSTECTOMY      TUBAL LIGATION       Social History   Social History     Substance and Sexual Activity   Alcohol Use No     Social History     Substance and Sexual Activity   Drug Use No     Social History     Tobacco Use   Smoking Status Current Every Day Smoker    Types: Cigarettes   Smokeless Tobacco Never Used     History reviewed  No pertinent family history      Meds/Allergies       Current Outpatient Medications:     cloNIDine (CATAPRES) 0 2 mg tablet    gabapentin (NEURONTIN) 100 mg capsule    citalopram (CeleXA) 20 mg tablet    DEXILANT 60 MG capsule    divalproex sodium (DEPAKOTE ER) 500 mg 24 hr tablet    famotidine (PEPCID) 20 mg tablet    fluticasone (Flovent HFA) 44 mcg/act inhaler    ibuprofen (MOTRIN) 600 mg tablet    loratadine (CLARITIN) 10 mg tablet    methocarbamol (ROBAXIN) 500 mg tablet    Multiple Vitamin (ONE-A-DAY ESSENTIAL PO)    Multiple Vitamins-Calcium (ONE-A-DAY WOMENS FORMULA PO)    Multiple Vitamins-Calcium (ONE-A-DAY WOMENS PO)    Multiple Vitamins-Minerals (ONE-A-DAY WOMENS 50+ ADVANTAGE PO)    Multiple Vitamins-Minerals (ONE-A-DAY WOMENS PETITES) TABS    multivitamin (THERAGRAN) TABS    naproxen (NAPROSYN) 500 mg tablet    NIX CREME RINSE 1 % liquid    sucralfate (CARAFATE) 1 g tablet    traZODone (DESYREL) 50 mg tablet    Allergies   Allergen Reactions    Bee Venom     Beeswax     Penicillins      Pt unsure as to what happens            Objective     Blood pressure 122/80, pulse 97, temperature 97 7 °F (36 5 °C), temperature source Tympanic, height 5' 7" (1 702 m), weight 72 5 kg (159 lb 12 8 oz)  Body mass index is 25 03 kg/m²  PHYSICAL EXAM:      General Appearance:   Alert, cooperative, no distress   HEENT:   Normocephalic, atraumatic, anicteric      Neck:  Supple, symmetrical, trachea midline   Lungs:   Clear to auscultation bilaterally; no rales, rhonchi or wheezing; respirations unlabored    Heart[de-identified]   Regular rate and rhythm; no murmur, rub, or gallop  Abdomen:   Soft, non-tender, non-distended; normal bowel sounds; no masses, no organomegaly    Genitalia:   Deferred    Rectal:   Deferred    Extremities:  No cyanosis, clubbing or edema    Pulses:  2+ and symmetric    Skin:  No jaundice, rashes, or lesions    Lymph nodes:  No palpable cervical lymphadenopathy        Lab Results:   No visits with results within 1 Day(s) from this visit  Latest known visit with results is:   Admission on 11/02/2019, Discharged on 11/02/2019   Component Date Value    Extra Tube 11/02/2019 Hold for add-ons   Extra Tube 11/02/2019 Hold for add-ons   Extra Tube 11/02/2019 Hold for add-ons       Troponin I 11/02/2019 <0 02     Total Bilirubin 11/02/2019 0 20     Bilirubin, Direct 11/02/2019 0 04     Alkaline Phosphatase 11/02/2019 65     AST 11/02/2019 11     ALT 11/02/2019 13     Total Protein 11/02/2019 6 7     Albumin 11/02/2019 3 5     D-Dimer, Quant 11/02/2019 <0 27     WBC 11/02/2019 7 11     RBC 11/02/2019 4 31     Hemoglobin 11/02/2019 14 6     Hematocrit 11/02/2019 43 5     MCV 11/02/2019 101*    MCH 11/02/2019 33 9     [facility] 11/02/2019 33 6     RDW 11/02/2019 12 0     MPV 11/02/2019 9 2     Platelets 64/05/2821 304     nRBC 11/02/2019 0     Neutrophils Relative 11/02/2019 49     Immat GRANS % 11/02/2019 0     Lymphocytes Relative 11/02/2019 37     Monocytes Relative 11/02/2019 11     Eosinophils Relative 11/02/2019 2     Basophils Relative 11/02/2019 1     Neutrophils Absolute 11/02/2019 3 49     Immature Grans Absolute 11/02/2019 0 01     Lymphocytes Absolute 11/02/2019 2 61     Monocytes Absolute 11/02/2019 0 79     Eosinophils Absolute 11/02/2019 0 16     Basophils Absolute 11/02/2019 0 05     Sodium 11/02/2019 142     Potassium 11/02/2019 3 8     Chloride 11/02/2019 107     CO2 11/02/2019 26     ANION GAP 11/02/2019 9     BUN 11/02/2019 17     Creatinine 11/02/2019 0 91     Glucose 11/02/2019 109     Calcium 11/02/2019 8 3     eGFR 11/02/2019 75     PTT 11/02/2019 29     Protime 11/02/2019 13 0     INR 11/02/2019 0 98     Ventricular Rate 11/02/2019 100     Atrial Rate 11/02/2019 100     MD Interval 11/02/2019 206     QRSD Interval 11/02/2019 136     QT Interval 11/02/2019 368     QTC Interval 11/02/2019 474     P Axis 11/02/2019 75     QRS Axis 11/02/2019 85     T Wave Axis 11/02/2019 52          Radiology Results:   No results found

## 2020-07-16 LAB
INPATIENT: NORMAL
SARS-COV-2 RNA SPEC QL NAA+PROBE: NOT DETECTED

## 2020-07-20 DIAGNOSIS — K21.9 GASTROESOPHAGEAL REFLUX DISEASE, ESOPHAGITIS PRESENCE NOT SPECIFIED: ICD-10-CM

## 2020-07-20 NOTE — TELEPHONE ENCOUNTER
Tan Ask pt  8:20am      Pt called that the dexilant that was to be sent to pharmacy never was received please send to [facility]

## 2020-07-21 ENCOUNTER — TELEPHONE (OUTPATIENT)
Dept: GASTROENTEROLOGY | Facility: CLINIC | Age: 50
End: 2020-07-21

## 2020-07-21 NOTE — TELEPHONE ENCOUNTER
Margaret pt-  What is the status of the Dexilant prior authorization? Please phone patient @ [phone] to advise  Zan Mendez

## 2020-07-22 ENCOUNTER — ANESTHESIA EVENT (OUTPATIENT)
Dept: GASTROENTEROLOGY | Facility: HOSPITAL | Age: 50
End: 2020-07-22

## 2020-07-23 ENCOUNTER — HOSPITAL ENCOUNTER (OUTPATIENT)
Dept: GASTROENTEROLOGY | Facility: HOSPITAL | Age: 50
Setting detail: OUTPATIENT SURGERY
Discharge: HOME | End: 2020-07-23
Attending: INTERNAL MEDICINE
Payer: COMMERCIAL

## 2020-07-23 ENCOUNTER — ANESTHESIA (OUTPATIENT)
Dept: GASTROENTEROLOGY | Facility: HOSPITAL | Age: 50
End: 2020-07-23

## 2020-07-23 VITALS
HEART RATE: 96 BPM | DIASTOLIC BLOOD PRESSURE: 76 MMHG | SYSTOLIC BLOOD PRESSURE: 126 MMHG | OXYGEN SATURATION: 98 % | TEMPERATURE: 97.5 F | RESPIRATION RATE: 14 BRPM

## 2020-07-23 DIAGNOSIS — K21.9 GASTROESOPHAGEAL REFLUX DISEASE, ESOPHAGITIS PRESENCE NOT SPECIFIED: ICD-10-CM

## 2020-07-23 LAB
EXT PREGNANCY TEST URINE: NEGATIVE
EXT. CONTROL: NORMAL

## 2020-07-23 PROCEDURE — 88305 TISSUE EXAM BY PATHOLOGIST: CPT | Performed by: PATHOLOGY

## 2020-07-23 PROCEDURE — 81025 URINE PREGNANCY TEST: CPT | Performed by: ANESTHESIOLOGY

## 2020-07-23 PROCEDURE — 43239 EGD BIOPSY SINGLE/MULTIPLE: CPT | Performed by: INTERNAL MEDICINE

## 2020-07-23 RX ORDER — SODIUM CHLORIDE, SODIUM LACTATE, POTASSIUM CHLORIDE, CALCIUM CHLORIDE 600; 310; 30; 20 MG/100ML; MG/100ML; MG/100ML; MG/100ML
125 INJECTION, SOLUTION INTRAVENOUS CONTINUOUS
Status: DISCONTINUED | OUTPATIENT
Start: 2020-07-23 | End: 2020-07-27 | Stop reason: HOSPADM

## 2020-07-23 RX ORDER — ONDANSETRON 2 MG/ML
4 INJECTION INTRAMUSCULAR; INTRAVENOUS ONCE AS NEEDED
Status: DISCONTINUED | OUTPATIENT
Start: 2020-07-23 | End: 2020-07-27 | Stop reason: HOSPADM

## 2020-07-23 RX ORDER — PROPOFOL 10 MG/ML
INJECTION, EMULSION INTRAVENOUS AS NEEDED
Status: DISCONTINUED | OUTPATIENT
Start: 2020-07-23 | End: 2020-07-23 | Stop reason: SURG

## 2020-07-23 RX ORDER — LABETALOL 20 MG/4 ML (5 MG/ML) INTRAVENOUS SYRINGE
5
Status: DISCONTINUED | OUTPATIENT
Start: 2020-07-23 | End: 2020-07-27 | Stop reason: HOSPADM

## 2020-07-23 RX ORDER — PROMETHAZINE HYDROCHLORIDE 25 MG/ML
12.5 INJECTION, SOLUTION INTRAMUSCULAR; INTRAVENOUS ONCE AS NEEDED
Status: DISCONTINUED | OUTPATIENT
Start: 2020-07-23 | End: 2020-07-27 | Stop reason: HOSPADM

## 2020-07-23 RX ORDER — MEPERIDINE HYDROCHLORIDE 50 MG/ML
12.5 INJECTION INTRAMUSCULAR; INTRAVENOUS; SUBCUTANEOUS ONCE
Status: DISCONTINUED | OUTPATIENT
Start: 2020-07-23 | End: 2020-07-27 | Stop reason: HOSPADM

## 2020-07-23 RX ORDER — FENTANYL CITRATE/PF 50 MCG/ML
25 SYRINGE (ML) INJECTION
Status: DISCONTINUED | OUTPATIENT
Start: 2020-07-23 | End: 2020-07-27 | Stop reason: HOSPADM

## 2020-07-23 RX ORDER — ALBUTEROL SULFATE 2.5 MG/3ML
2.5 SOLUTION RESPIRATORY (INHALATION) ONCE AS NEEDED
Status: DISCONTINUED | OUTPATIENT
Start: 2020-07-23 | End: 2020-07-27 | Stop reason: HOSPADM

## 2020-07-23 RX ORDER — LIDOCAINE HYDROCHLORIDE 10 MG/ML
0.5 INJECTION, SOLUTION EPIDURAL; INFILTRATION; INTRACAUDAL; PERINEURAL ONCE AS NEEDED
Status: DISCONTINUED | OUTPATIENT
Start: 2020-07-23 | End: 2020-07-27 | Stop reason: HOSPADM

## 2020-07-23 RX ORDER — HYDROMORPHONE HCL/PF 1 MG/ML
0.5 SYRINGE (ML) INJECTION
Status: DISCONTINUED | OUTPATIENT
Start: 2020-07-23 | End: 2020-07-27 | Stop reason: HOSPADM

## 2020-07-23 RX ORDER — LIDOCAINE HYDROCHLORIDE 10 MG/ML
INJECTION, SOLUTION EPIDURAL; INFILTRATION; INTRACAUDAL; PERINEURAL AS NEEDED
Status: DISCONTINUED | OUTPATIENT
Start: 2020-07-23 | End: 2020-07-23 | Stop reason: SURG

## 2020-07-23 RX ADMIN — LIDOCAINE HYDROCHLORIDE 50 MG: 10 INJECTION, SOLUTION EPIDURAL; INFILTRATION; INTRACAUDAL; PERINEURAL at 08:10

## 2020-07-23 RX ADMIN — SODIUM CHLORIDE, SODIUM LACTATE, POTASSIUM CHLORIDE, AND CALCIUM CHLORIDE: .6; .31; .03; .02 INJECTION, SOLUTION INTRAVENOUS at 08:08

## 2020-07-23 RX ADMIN — PROPOFOL 100 MG: 10 INJECTION, EMULSION INTRAVENOUS at 08:10

## 2020-07-23 RX ADMIN — SODIUM CHLORIDE, SODIUM LACTATE, POTASSIUM CHLORIDE, AND CALCIUM CHLORIDE: .6; .31; .03; .02 INJECTION, SOLUTION INTRAVENOUS at 07:37

## 2020-07-23 NOTE — H&P
History and Physical -  [facility]  Renee Peterson 52 y o  female MRN: [id number]      HPI: Fermin Galloway is a 52y o  year old female who presents for evaluation of gastroesophageal reflux disease      REVIEW OF SYSTEMS: Per the HPI, and otherwise unremarkable  Historical Information   Past Medical History:   Diagnosis Date    GERD (gastroesophageal reflux disease)     Psychiatric disorder      Past Surgical History:   Procedure Laterality Date    CHOLECYSTECTOMY      TUBAL LIGATION       Social History   Social History     Substance and Sexual Activity   Alcohol Use No     Social History     Substance and Sexual Activity   Drug Use No     Social History     Tobacco Use   Smoking Status Current Every Day Smoker    Types: Cigarettes   Smokeless Tobacco Never Used     No family history on file  Meds/Allergies       (Not in a hospital admission)    Allergies   Allergen Reactions    Bee Venom     Beeswax     Penicillins      Pt unsure as to what happens        Objective     There were no vitals taken for this visit  PHYSICAL EXAM    Gen: NAD  CV: RRR  CHEST: Clear  ABD: soft, NT/ND  EXT: no edema      ASSESSMENT/PLAN:  This is a 52y o  year old female here for esophagogastroduodenoscopy, and she is stable and optimized for her procedure

## 2020-07-23 NOTE — ANESTHESIA PREPROCEDURE EVALUATION
Review of Systems/Medical History  Patient summary reviewed        Cardiovascular  Negative cardio ROS Exercise tolerance (METS): >4,     Pulmonary  Negative pulmonary ROS        GI/Hepatic    GERD ,        Negative  ROS        Endo/Other  Negative endo/other ROS      GYN  Negative gynecology ROS          Hematology  Negative hematology ROS      Musculoskeletal  Negative musculoskeletal ROS        Neurology  Negative neurology ROS      Psychology   Negative psychology ROS              Physical Exam    Airway    Mallampati score: I  TM Distance: >3 FB  Neck ROM: full     Dental       Cardiovascular  Comment: Negative ROS,     Pulmonary      Other Findings        Anesthesia Plan  ASA Score- 2     Anesthesia Type- IV sedation with anesthesia with ASA Monitors  Additional Monitors:   Airway Plan:     Comment: I have seen the patient and reviewed the history  Patient to receive IV sedation with full ASA monitors  Risks discussed with the patient, consent signed        Plan Factors-    Induction- intravenous  Postoperative Plan-     Informed Consent- Anesthetic plan and risks discussed with patient  I personally reviewed this patient with the CRNA  Discussed and agreed on the Anesthesia Plan with the CRNA  Dara Soulier

## 2020-07-23 NOTE — DISCHARGE INSTRUCTIONS
Upper Endoscopy   WHAT YOU NEED TO KNOW:   An upper endoscopy is also called an upper gastrointestinal (GI) endoscopy, or an esophagogastroduodenoscopy (EGD)  You may feel bloated, gassy, or have some abdominal discomfort after your procedure  Your throat may be sore for 24 to 36 hours  You may burp or pass gas from air that is still inside your body  DISCHARGE INSTRUCTIONS:   Call 911 if:   · You have sudden chest pain or trouble breathing  Seek care immediately if:   · You feel dizzy or faint  · You have trouble swallowing  · You have severe throat pain  · Your bowel movements are very dark or black  · Your abdomen is hard and firm and you have severe pain  · You vomit blood  Contact your healthcare provider if:   · You feel full or bloated and cannot burp or pass gas  · You have not had a bowel movement for 3 days after your procedure  · You have neck pain  · You have a fever or chills  · You have nausea or are vomiting  · You have a rash or hives  · You have questions or concerns about your endoscopy  Relieve a sore throat:  Suck on throat lozenges or crushed ice  Gargle with a small amount of warm salt water  Mix 1 teaspoon of salt and 1 cup of warm water to make salt water  Relieve gas and discomfort from bloating:  Lie on your right side with a heating pad on your abdomen  Take short walks to help pass gas  Eat small meals until bloating is relieved  Rest after your procedure:  Do not drive or make important decisions until the day after your procedure  Return to your normal activity as directed  You can usually return to work the day after your procedure  Follow up with your healthcare provider as directed:  Write down your questions so you remember to ask them during your visits  © 2017 [employer]  Information is for End User's use only and may not be sold, redistributed or otherwise used for commercial purposes   All illustrations and images included in [employer] are the copyrighted property of [employer]  or Roshan Rivas  The above information is an  only  It is not intended as medical advice for individual conditions or treatments  Talk to your doctor, nurse or pharmacist before following any medical regimen to see if it is safe and effective for you

## 2020-07-23 NOTE — ANESTHESIA POSTPROCEDURE EVALUATION
Post-Op Assessment Note    CV Status:  Stable    Pain management: adequate     Mental Status:  Alert and awake   Hydration Status:  Euvolemic   PONV Controlled:  Controlled   Airway Patency:  Patent   Post Op Vitals Reviewed: Yes      Staff: CRNA           BP 95/63 (07/23/20 0816)    Temp   98   Pulse 81 (07/23/20 0816)   Resp 16 (07/23/20 0816)    SpO2 100 % (07/23/20 0816)

## 2020-07-27 DIAGNOSIS — K21.9 GASTROESOPHAGEAL REFLUX DISEASE, ESOPHAGITIS PRESENCE NOT SPECIFIED: Primary | ICD-10-CM

## 2020-07-27 RX ORDER — FAMOTIDINE 20 MG/1
20 TABLET, FILM COATED ORAL 2 TIMES DAILY
Qty: 60 TABLET | Refills: 2 | Status: SHIPPED | OUTPATIENT
Start: 2020-07-27 | End: 2020-08-10 | Stop reason: SDUPTHER

## 2020-07-27 NOTE — TELEPHONE ENCOUNTER
Spoke wit patient  History of GERD    Patient c/o continued symptoms no change  She started dexilant 1 week ago, and has not taken famotidine in a few months  She understands we will call her with her biopsy results, and send her a refill for the famotidine she was supposed to be taking

## 2020-07-27 NOTE — TELEPHONE ENCOUNTER
Margaret pt-  Patient recently had an EGD  Zan Mendez She continues to have a vomiting issue and a painful stomach     Uses:[employer] [phone]  Please phone [phone] to advise  Zan Mendez

## 2020-07-30 ENCOUNTER — OFFICE VISIT (OUTPATIENT)
Dept: GASTROENTEROLOGY | Facility: CLINIC | Age: 50
End: 2020-07-30
Payer: COMMERCIAL

## 2020-07-30 ENCOUNTER — PREP FOR PROCEDURE (OUTPATIENT)
Dept: GASTROENTEROLOGY | Facility: CLINIC | Age: 50
End: 2020-07-30

## 2020-07-30 VITALS
BODY MASS INDEX: 25.15 KG/M2 | WEIGHT: 160.2 LBS | HEIGHT: 67 IN | TEMPERATURE: 98.1 F | SYSTOLIC BLOOD PRESSURE: 110 MMHG | HEART RATE: 88 BPM | DIASTOLIC BLOOD PRESSURE: 68 MMHG

## 2020-07-30 DIAGNOSIS — K21.9 GASTROESOPHAGEAL REFLUX DISEASE, ESOPHAGITIS PRESENCE NOT SPECIFIED: Primary | ICD-10-CM

## 2020-07-30 DIAGNOSIS — R10.13 EPIGASTRIC PAIN: Primary | ICD-10-CM

## 2020-07-30 DIAGNOSIS — K21.9 GASTROESOPHAGEAL REFLUX DISEASE WITHOUT ESOPHAGITIS: ICD-10-CM

## 2020-07-30 PROCEDURE — 99214 OFFICE O/P EST MOD 30 MIN: CPT | Performed by: PHYSICIAN ASSISTANT

## 2020-07-30 RX ORDER — HYDROCORTISONE ACETATE 25 MG/1
25 SUPPOSITORY RECTAL 2 TIMES DAILY
COMMUNITY
Start: 2020-07-28 | End: 2020-09-15

## 2020-07-30 RX ORDER — AMITRIPTYLINE HYDROCHLORIDE 25 MG/1
25 TABLET, FILM COATED ORAL
Qty: 30 TABLET | Refills: 3 | Status: SHIPPED | OUTPATIENT
Start: 2020-07-30 | End: 2020-12-04 | Stop reason: SDUPTHER

## 2020-07-30 NOTE — PROGRESS NOTES
[facility] - Outpatient Follow-up Note  David Kitchen 52 y o  female MRN: [id number]  Encounter: [id number]          ASSESSMENT AND PLAN:      1  Epigastric pain  2  Gastroesophageal reflux disease without esophagitis  We have tried multiple medications including Omeprazole, Pantoprazole, Esomeprazole, Dexilant, Famotidine, Zantac, Carafate, Reglan, Ondansetron, Bethenachol and Celexa without any relief  Extensive testing including EGD, US, HIDA CCK, GES, multiple CTs    We will try Amitritpyline 25mg qhs    Will arrange for 24hr pH and esophageal manometry    ______________________________________________________________________    SUBJECTIVE:  57-year-old female with chronic abdominal pain, nausea and heartburn presents for follow-up after EGD  Her EGD with biopsies of the stomach and small bowel was normal   She has had multiple EGDs which have all been reported as normal   She has been complaining of the symptoms for many years  She has been treated with multiple medications including all proton pump inhibitors, famotidine, Zantac, Carafate, Reglan, ondansetron, bethanechol and Celexa without any relief in symptoms  She has been to the emergency room a multitude of times with these complaints  She has had multiple CT scans, ultrasounds she has had a HIDA with CCK, gastric emptying study all of which were reported as normal   She suffers from severe anxiety and depression which is clearly playing a role in her symptoms  She is currently treated with Celexa and trazodone for this  She continues to deny alarm symptoms such as unexpected weight loss, fevers, chills, night sweats, hematemesis, dysphagia, melena or rectal bleeding  REVIEW OF SYSTEMS IS OTHERWISE NEGATIVE        Historical Information   Past Medical History:   Diagnosis Date    GERD (gastroesophageal reflux disease)     Psychiatric disorder      Past Surgical History:   Procedure Laterality Date    CHOLECYSTECTOMY      TUBAL LIGATION       Social History   Social History     Substance and Sexual Activity   Alcohol Use No     Social History     Substance and Sexual Activity   Drug Use No     Social History     Tobacco Use   Smoking Status Current Every Day Smoker    Packs/day: 1 00    Types: Cigarettes   Smokeless Tobacco Never Used     History reviewed  No pertinent family history  Meds/Allergies       Current Outpatient Medications:     citalopram (CeleXA) 20 mg tablet    DEXILANT 60 MG capsule    divalproex sodium (DEPAKOTE ER) 500 mg 24 hr tablet    famotidine (PEPCID) 20 mg tablet    fluticasone (Flovent HFA) 44 mcg/act inhaler    gabapentin (NEURONTIN) 100 mg capsule    hydrocortisone (ANUSOL-HC) 25 mg suppository    ibuprofen (MOTRIN) 600 mg tablet    loratadine (CLARITIN) 10 mg tablet    methocarbamol (ROBAXIN) 500 mg tablet    Multiple Vitamins-Calcium (ONE-A-DAY WOMENS PO)    NIX CREME RINSE 1 % liquid    sucralfate (CARAFATE) 1 g tablet    traZODone (DESYREL) 50 mg tablet    amitriptyline (ELAVIL) 25 mg tablet    naproxen (NAPROSYN) 500 mg tablet    Allergies   Allergen Reactions    Bee Venom     Beeswax     Penicillins      Pt unsure as to what happens            Objective     Blood pressure 110/68, pulse 88, temperature 98 1 °F (36 7 °C), height 5' 7" (1 702 m), weight 72 7 kg (160 lb 3 2 oz), last menstrual period 07/13/2020  Body mass index is 25 09 kg/m²  PHYSICAL EXAM:      General Appearance:   Alert, cooperative, no distress   HEENT:   Normocephalic, atraumatic, anicteric      Neck:  Supple, symmetrical, trachea midline   Lungs:   Clear to auscultation bilaterally; no rales, rhonchi or wheezing; respirations unlabored    Heart[de-identified]   Regular rate and rhythm; no murmur, rub, or gallop     Abdomen:   Soft, non-tender, non-distended; normal bowel sounds; no masses, no organomegaly    Genitalia:   Deferred    Rectal:   Deferred    Extremities:  No cyanosis, clubbing or edema    Pulses:  2+ and symmetric    Skin:  No jaundice, rashes, or lesions    Lymph nodes:  No palpable cervical lymphadenopathy        Lab Results:   No visits with results within 1 Day(s) from this visit  Latest known visit with results is:   Hospital Outpatient Visit on 07/23/2020   Component Date Value    EXT Preg Test, Ur 07/23/2020 Negative     Control 07/23/2020 Valid     Case Report 07/23/2020                      Value:Surgical Pathology Report                         Case: [id number]                                   Authorizing Provider:  Raf Nash DO          Collected:           07/23/2020 5318              Ordering Location:      [facility]       Received:            07/23/2020 [address] [facility]                                                             Pathologist:           Fabricio Weller MD                                                          Specimen:    Stomach, antrum                                                                            Final Diagnosis 07/23/2020                      Value: This result contains rich text formatting which cannot be displayed here   Additional Information 07/23/2020                      Value: This result contains rich text formatting which cannot be displayed here  Yajaira Watt Description 07/23/2020                      Value: This result contains rich text formatting which cannot be displayed here   Clinical Information 07/23/2020                      Value:Cold bx  R/o h pylori         Radiology Results:   No results found

## 2020-08-10 DIAGNOSIS — K21.9 GASTROESOPHAGEAL REFLUX DISEASE, ESOPHAGITIS PRESENCE NOT SPECIFIED: ICD-10-CM

## 2020-08-10 RX ORDER — FAMOTIDINE 20 MG/1
20 TABLET, FILM COATED ORAL 2 TIMES DAILY
Qty: 60 TABLET | Refills: 2 | Status: SHIPPED | OUTPATIENT
Start: 2020-08-10 | End: 2020-09-01 | Stop reason: SDUPTHER

## 2020-08-10 NOTE — TELEPHONE ENCOUNTER
Rick Kiser - patient called refill  Dexilant 60 mg capsule 1 capsule daily  Famotidine 20 mg tablet 2 times daily     Please call [facility] at [phone]

## 2020-08-13 ENCOUNTER — TELEPHONE (OUTPATIENT)
Dept: GASTROENTEROLOGY | Facility: CLINIC | Age: 50
End: 2020-08-13

## 2020-08-13 NOTE — TELEPHONE ENCOUNTER
Patient is on dexilant but says pharmacy wont fill it  Is there something else she can take or be prescribed in the meantime? [phone]

## 2020-08-13 NOTE — TELEPHONE ENCOUNTER
Called pharmacy  Was told refill was too soon and can fill on Saturday    Tried to call pt:   Mail box full and cannot leave a message

## 2020-08-31 ENCOUNTER — TELEPHONE (OUTPATIENT)
Dept: GASTROENTEROLOGY | Facility: CLINIC | Age: 50
End: 2020-08-31

## 2020-08-31 NOTE — TELEPHONE ENCOUNTER
Brandon Bello pt - [facility]   Ty

## 2020-08-31 NOTE — TELEPHONE ENCOUNTER
Pt called back - advised pt she has refills left on the prescription  Told her to call  Pharm to request refill  Pt advised she needs to cancel the 24 PH testing scheduled for 9/1/20 due to lack of transportation  Advised pt to contact central scheduling directly to cancel appt

## 2020-09-01 ENCOUNTER — TELEPHONE (OUTPATIENT)
Dept: GASTROENTEROLOGY | Facility: CLINIC | Age: 50
End: 2020-09-01

## 2020-09-01 DIAGNOSIS — K21.9 GASTROESOPHAGEAL REFLUX DISEASE, ESOPHAGITIS PRESENCE NOT SPECIFIED: Primary | ICD-10-CM

## 2020-09-01 NOTE — TELEPHONE ENCOUNTER
Margaret pt-  Patient is experiencing stomach discomfort, heartburn and vomiting     She has had the symptoms for a few days now     Heath Olivo [phone]  Please phone [phone] to advise

## 2020-09-01 NOTE — TELEPHONE ENCOUNTER
Spoke with patient  History of epigastric pain, GERD    Patient c/o continued epigastric pain, GERD  Patient has not been utilizing her dexilant appropriately  She has been taking it more often  She stopped her famotidine  Taking amitriptyline at HS  Advised patient dexilant is once a day, restart her famotidine, and continue amitriptyline  She has been unable to get share ride to take her to the [facility] manometry, and only her daughters caregivers are visiting her home  Patients personal caregivers have not returned to work  Patient will call and reschedule when she has a ride  We will send refill for famotidine

## 2020-09-05 ENCOUNTER — HOSPITAL ENCOUNTER (EMERGENCY)
Facility: HOSPITAL | Age: 50
Discharge: HOME | End: 2020-09-06
Attending: EMERGENCY MEDICINE
Payer: COMMERCIAL

## 2020-09-05 DIAGNOSIS — R07.9 CHEST PAIN, UNSPECIFIED TYPE: Primary | ICD-10-CM

## 2020-09-05 PROCEDURE — 93005 ELECTROCARDIOGRAM TRACING: CPT

## 2020-09-05 PROCEDURE — 94640 AIRWAY INHALATION TREATMENT: CPT

## 2020-09-05 PROCEDURE — 99284 EMERGENCY DEPT VISIT MOD MDM: CPT

## 2020-09-06 ENCOUNTER — APPOINTMENT (EMERGENCY)
Dept: RADIOLOGY | Facility: HOSPITAL | Age: 50
End: 2020-09-06
Payer: COMMERCIAL

## 2020-09-06 VITALS
TEMPERATURE: 98.7 F | SYSTOLIC BLOOD PRESSURE: 121 MMHG | BODY MASS INDEX: 25.07 KG/M2 | WEIGHT: 160.05 LBS | OXYGEN SATURATION: 98 % | DIASTOLIC BLOOD PRESSURE: 64 MMHG | HEART RATE: 80 BPM | RESPIRATION RATE: 20 BRPM

## 2020-09-06 LAB
ALBUMIN SERPL BCP-MCNC: 3.3 G/DL (ref 3.5–5)
ALP SERPL-CCNC: 50 U/L (ref 46–116)
ALT SERPL W P-5'-P-CCNC: 11 U/L (ref 12–78)
ANION GAP SERPL CALCULATED.3IONS-SCNC: 7 MMOL/L (ref 4–13)
AST SERPL W P-5'-P-CCNC: 8 U/L (ref 5–45)
ATRIAL RATE: 84 BPM
BASOPHILS # BLD AUTO: 0.04 THOUSANDS/ΜL (ref 0–0.1)
BASOPHILS NFR BLD AUTO: 1 % (ref 0–1)
BILIRUB SERPL-MCNC: 0.2 MG/DL (ref 0.2–1)
BUN SERPL-MCNC: 15 MG/DL (ref 5–25)
CALCIUM SERPL-MCNC: 8.5 MG/DL (ref 8.3–10.1)
CHLORIDE SERPL-SCNC: 108 MMOL/L (ref 100–108)
CO2 SERPL-SCNC: 26 MMOL/L (ref 21–32)
CREAT SERPL-MCNC: 0.98 MG/DL (ref 0.6–1.3)
EOSINOPHIL # BLD AUTO: 0.19 THOUSAND/ΜL (ref 0–0.61)
EOSINOPHIL NFR BLD AUTO: 2 % (ref 0–6)
ERYTHROCYTE [DISTWIDTH] IN BLOOD BY AUTOMATED COUNT: 11.9 % (ref 11.6–15.1)
GFR SERPL CREATININE-BSD FRML MDRD: 68 ML/MIN/1.73SQ M
GLUCOSE SERPL-MCNC: 98 MG/DL (ref 65–140)
HCT VFR BLD AUTO: 39.4 % (ref 34.8–46.1)
HGB BLD-MCNC: 13.4 G/DL (ref 11.5–15.4)
IMM GRANULOCYTES # BLD AUTO: 0.02 THOUSAND/UL (ref 0–0.2)
IMM GRANULOCYTES NFR BLD AUTO: 0 % (ref 0–2)
LIPASE SERPL-CCNC: 74 U/L (ref 73–393)
LYMPHOCYTES # BLD AUTO: 2.91 THOUSANDS/ΜL (ref 0.6–4.47)
LYMPHOCYTES NFR BLD AUTO: 36 % (ref 14–44)
MCH RBC QN AUTO: 33.9 PG (ref 26.8–34.3)
MCHC RBC AUTO-ENTMCNC: 34 G/DL (ref 31.4–37.4)
MCV RBC AUTO: 100 FL (ref 82–98)
MONOCYTES # BLD AUTO: 0.82 THOUSAND/ΜL (ref 0.17–1.22)
MONOCYTES NFR BLD AUTO: 10 % (ref 4–12)
NEUTROPHILS # BLD AUTO: 4.07 THOUSANDS/ΜL (ref 1.85–7.62)
NEUTS SEG NFR BLD AUTO: 51 % (ref 43–75)
NRBC BLD AUTO-RTO: 0 /100 WBCS
P AXIS: 71 DEGREES
PLATELET # BLD AUTO: 265 THOUSANDS/UL (ref 149–390)
PMV BLD AUTO: 9.2 FL (ref 8.9–12.7)
POTASSIUM SERPL-SCNC: 3.6 MMOL/L (ref 3.5–5.3)
PR INTERVAL: 212 MS
PROT SERPL-MCNC: 6.3 G/DL (ref 6.4–8.2)
QRS AXIS: 86 DEGREES
QRSD INTERVAL: 116 MS
QT INTERVAL: 384 MS
QTC INTERVAL: 453 MS
RBC # BLD AUTO: 3.95 MILLION/UL (ref 3.81–5.12)
SODIUM SERPL-SCNC: 141 MMOL/L (ref 136–145)
T WAVE AXIS: 65 DEGREES
TROPONIN I SERPL-MCNC: <0.02 NG/ML
VENTRICULAR RATE: 84 BPM
WBC # BLD AUTO: 8.05 THOUSAND/UL (ref 4.31–10.16)

## 2020-09-06 PROCEDURE — 96374 THER/PROPH/DIAG INJ IV PUSH: CPT

## 2020-09-06 PROCEDURE — 71045 X-RAY EXAM CHEST 1 VIEW: CPT

## 2020-09-06 PROCEDURE — 80053 COMPREHEN METABOLIC PANEL: CPT | Performed by: EMERGENCY MEDICINE

## 2020-09-06 PROCEDURE — 93010 ELECTROCARDIOGRAM REPORT: CPT | Performed by: INTERNAL MEDICINE

## 2020-09-06 PROCEDURE — 99284 EMERGENCY DEPT VISIT MOD MDM: CPT | Performed by: EMERGENCY MEDICINE

## 2020-09-06 PROCEDURE — 85025 COMPLETE CBC W/AUTO DIFF WBC: CPT | Performed by: EMERGENCY MEDICINE

## 2020-09-06 PROCEDURE — 83690 ASSAY OF LIPASE: CPT | Performed by: EMERGENCY MEDICINE

## 2020-09-06 PROCEDURE — 36415 COLL VENOUS BLD VENIPUNCTURE: CPT | Performed by: EMERGENCY MEDICINE

## 2020-09-06 PROCEDURE — 84484 ASSAY OF TROPONIN QUANT: CPT | Performed by: EMERGENCY MEDICINE

## 2020-09-06 RX ORDER — NAPROXEN 500 MG/1
500 TABLET ORAL 2 TIMES DAILY WITH MEALS
Qty: 30 TABLET | Refills: 0 | Status: SHIPPED | OUTPATIENT
Start: 2020-09-06

## 2020-09-06 RX ORDER — IPRATROPIUM BROMIDE AND ALBUTEROL SULFATE 2.5; .5 MG/3ML; MG/3ML
3 SOLUTION RESPIRATORY (INHALATION)
Status: DISPENSED | OUTPATIENT
Start: 2020-09-06 | End: 2020-09-06

## 2020-09-06 RX ORDER — ACETAMINOPHEN 325 MG/1
650 TABLET ORAL ONCE
Status: COMPLETED | OUTPATIENT
Start: 2020-09-06 | End: 2020-09-06

## 2020-09-06 RX ORDER — DEXAMETHASONE SODIUM PHOSPHATE 10 MG/ML
10 INJECTION, SOLUTION INTRAMUSCULAR; INTRAVENOUS ONCE
Status: COMPLETED | OUTPATIENT
Start: 2020-09-06 | End: 2020-09-06

## 2020-09-06 RX ORDER — IBUPROFEN 600 MG/1
600 TABLET ORAL ONCE
Status: COMPLETED | OUTPATIENT
Start: 2020-09-06 | End: 2020-09-06

## 2020-09-06 RX ADMIN — DEXAMETHASONE SODIUM PHOSPHATE 10 MG: 10 INJECTION, SOLUTION INTRAMUSCULAR; INTRAVENOUS at 00:15

## 2020-09-06 RX ADMIN — IPRATROPIUM BROMIDE AND ALBUTEROL SULFATE 3 ML: 2.5; .5 SOLUTION RESPIRATORY (INHALATION) at 00:20

## 2020-09-06 RX ADMIN — ACETAMINOPHEN 650 MG: 325 TABLET, FILM COATED ORAL at 00:14

## 2020-09-06 RX ADMIN — IBUPROFEN 600 MG: 600 TABLET ORAL at 01:30

## 2020-09-06 NOTE — ED PROVIDER NOTES
History  Chief Complaint   Patient presents with    Epigastric Pain     Pt has a GI hx of gerds, says this pain feels different, she states it feels like pressure in the center of her chest for a few days now     Patient is a 73-year-old female past medical history of asthma, GERD, bipolar disorder presenting for chest pain  Patient states over the last 3-4 days she has had constant central chest pressure which radiates to her back and she states started suddenly while sitting on the couch  States that she went to her PCP yesterday who ordered x-rays of her spine with that she has not received the results as of yet  States he has never had this pain before and that it does not feel like her GERD  She states she has been taking her acid reflux medications with no relief  Denies any falls, heavy lifting, new exercises or injuries  Denies any nausea/vomiting/diarrhea/constipation, shortness of breath, dizziness, leg pain or swelling, cough, fevers, rashes, vision changes  Denies any leg pain or swelling  She does report that she has been using her inhaler with some relief and has no prior history of intubations, unsure whether this is relative to her asthma or not  Epigastric Pain       Prior to Admission Medications   Prescriptions Last Dose Informant Patient Reported? Taking? Dexilant 60 MG capsule   No No   Sig: Take 1 capsule (60 mg total) by mouth daily   Multiple Vitamins-Calcium (ONE-A-DAY WOMENS PO)  Self Yes No   Sig: Take 1 tablet by mouth daily  NIX CREME RINSE 1 % liquid  Self Yes No   Sig: Apply topically one time for 1 dose   Reapply in one week   amitriptyline (ELAVIL) 25 mg tablet   No No   Sig: Take 1 tablet (25 mg total) by mouth daily at bedtime   citalopram (CeleXA) 20 mg tablet  Self Yes No   Sig: Take 20 mg by mouth daily   divalproex sodium (DEPAKOTE ER) 500 mg 24 hr tablet  Self Yes No   Sig: Take 500 mg by mouth 2 (two) times a day   famotidine (PEPCID) 20 mg tablet   No No Sig: Take 1 tablet (20 mg total) by mouth 2 (two) times a day   fluticasone (Flovent HFA) 44 mcg/act inhaler  Self Yes No   Sig: Inhale 2 puffs 2 (two) times a day   gabapentin (NEURONTIN) 100 mg capsule  Self Yes No   Sig: Take one (1) capsule by mouth twice a day   hydrocortisone (ANUSOL-HC) 25 mg suppository  Self Yes No   Sig: Insert 25 mg into the rectum 2 (two) times a day   loratadine (CLARITIN) 10 mg tablet  Self Yes No   Sig: Take 10 mg by mouth daily   methocarbamol (ROBAXIN) 500 mg tablet  Self Yes No   Sig: Take 500 mg by mouth 4 (four) times a day as needed   naproxen (NAPROSYN) 500 mg tablet   No No   Sig: Take 1 tablet (500 mg total) by mouth 2 (two) times a day with meals for 10 days As needed for mild pain control and reduce inflammation in her abdomen  sucralfate (CARAFATE) 1 g tablet  Self Yes No   Sig: TAKE ONE TABLET BY MOUTH FOUR TIMES DAILY Before meals and bedtime   traZODone (DESYREL) 50 mg tablet  Self Yes No   Sig: Take 50 mg by mouth      Facility-Administered Medications: None       Past Medical History:   Diagnosis Date    GERD (gastroesophageal reflux disease)     Psychiatric disorder        Past Surgical History:   Procedure Laterality Date    CHOLECYSTECTOMY      TUBAL LIGATION         History reviewed  No pertinent family history  I have reviewed and agree with the history as documented  E-Cigarette/Vaping    E-Cigarette Use Never User      E-Cigarette/Vaping Substances    Nicotine No     THC No     CBD No     Flavoring No     Other No     Unknown No      Social History     Tobacco Use    Smoking status: Current Every Day Smoker     Packs/day: 1 00     Types: Cigarettes    Smokeless tobacco: Never Used   Substance Use Topics    Alcohol use: No    Drug use: No       Review of Systems   All other systems reviewed and are negative  Physical Exam  Physical Exam  Vitals signs reviewed  Constitutional:       General: She is not in acute distress  Appearance: Normal appearance  She is not ill-appearing, toxic-appearing or diaphoretic  HENT:      Mouth/Throat:      Mouth: Mucous membranes are moist    Eyes:      Conjunctiva/sclera: Conjunctivae normal    Neck:      Musculoskeletal: Neck supple  Cardiovascular:      Rate and Rhythm: Normal rate and regular rhythm  Pulses: Normal pulses  Heart sounds: Normal heart sounds  Pulmonary:      Effort: Pulmonary effort is normal       Breath sounds: Wheezing present  Comments: Patient has diminished lung sounds to the right and mild expiratory wheezes throughout the lower and mid lung fields bilaterally  Abdominal:      General: Abdomen is flat  Palpations: Abdomen is soft  Tenderness: There is no abdominal tenderness  Musculoskeletal: Normal range of motion  General: No swelling or tenderness  Right lower leg: No edema  Left lower leg: No edema  Skin:     General: Skin is warm and dry  Neurological:      General: No focal deficit present  Mental Status: She is alert     Psychiatric:         Mood and Affect: Mood normal          Vital Signs  ED Triage Vitals [09/05/20 2357]   Temperature Pulse Respirations Blood Pressure SpO2   98 7 °F (37 1 °C) 81 18 121/64 97 %      Temp Source Heart Rate Source Patient Position - Orthostatic VS BP Location FiO2 (%)   Oral Monitor Lying Right arm --      Pain Score       Worst Possible Pain           Vitals:    09/05/20 2357 09/06/20 0000   BP: 121/64 121/64   Pulse: 81 80   Patient Position - Orthostatic VS: Lying Sitting         Visual Acuity      ED Medications  Medications   ipratropium-albuterol (DUO-NEB) 0 5-2 5 mg/3 mL inhalation solution 3 mL ( Nebulization Canceled Entry 9/6/20 0120)   ibuprofen (MOTRIN) tablet 600 mg (has no administration in time range)   acetaminophen (TYLENOL) tablet 650 mg (650 mg Oral Given 9/6/20 0014)   dexamethasone (PF) (DECADRON) injection 10 mg (10 mg Intravenous Given 9/6/20 0015) Diagnostic Studies  Results Reviewed     Procedure Component Value Units Date/Time    Troponin I [[id number]]  (Normal) Collected:  09/06/20 0010    Lab Status:  Final result Specimen:  Blood from Arm, Left Updated:  09/06/20 0033     Troponin I <0 02 ng/mL     Lipase [[id number]]  (Normal) Collected:  09/06/20 0010    Lab Status:  Final result Specimen:  Blood from Hand, Left Updated:  09/06/20 0031     Lipase 74 u/L     Comprehensive metabolic panel [[id number]]  (Abnormal) Collected:  09/06/20 0010    Lab Status:  Final result Specimen:  Blood from Hand, Left Updated:  09/06/20 0031     Sodium 141 mmol/L      Potassium 3 6 mmol/L      Chloride 108 mmol/L      CO2 26 mmol/L      ANION GAP 7 mmol/L      BUN 15 mg/dL      Creatinine 0 98 mg/dL      Glucose 98 mg/dL      Calcium 8 5 mg/dL      AST 8 U/L      ALT 11 U/L      Alkaline Phosphatase 50 U/L      Total Protein 6 3 g/dL      Albumin 3 3 g/dL      Total Bilirubin 0 20 mg/dL      eGFR 68 ml/min/1 73sq m     Narrative:       [place] guidelines for Chronic Kidney Disease (CKD):     Stage 1 with normal or high GFR (GFR > 90 mL/min/1 73 square meters)    Stage 2 Mild CKD (GFR = 60-89 mL/min/1 73 square meters)    Stage 3A Moderate CKD (GFR = 45-59 mL/min/1 73 square meters)    Stage 3B Moderate CKD (GFR = 30-44 mL/min/1 73 square meters)    Stage 4 Severe CKD (GFR = 15-29 mL/min/1 73 square meters)    Stage 5 End Stage CKD (GFR <15 mL/min/1 73 square meters)  Note: GFR calculation is accurate only with a steady state creatinine    CBC and differential [[id number]]  (Abnormal) Collected:  09/06/20 0010    Lab Status:  Final result Specimen:  Blood from Hand, Left Updated:  09/06/20 0016     WBC 8 05 Thousand/uL      RBC 3 95 Million/uL      Hemoglobin 13 4 g/dL      Hematocrit 39 4 %       fL      MCH 33 9 pg      MCHC 34 0 g/dL      RDW 11 9 %      MPV 9 2 fL      Platelets 954 Thousands/uL      nRBC 0 /100 WBCs Neutrophils Relative 51 %      Immat GRANS % 0 %      Lymphocytes Relative 36 %      Monocytes Relative 10 %      Eosinophils Relative 2 %      Basophils Relative 1 %      Neutrophils Absolute 4 07 Thousands/µL      Immature Grans Absolute 0 02 Thousand/uL      Lymphocytes Absolute 2 91 Thousands/µL      Monocytes Absolute 0 82 Thousand/µL      Eosinophils Absolute 0 19 Thousand/µL      Basophils Absolute 0 04 Thousands/µL                  XR chest 1 view portable   ED Interpretation by Trista Fraser DO (09/06 1010)   NAD                 Procedures  Procedures         ED Course  ED Course as of Sep 06 0127   Sun Sep 06, 2020   0124 Patient's cardiac workup unremarkable  Patient is heart score of 3 with no prior cardiac history feel that she is low risk, has also been experiencing pain for several days therefore 1 set of troponin should be adequate to rule out cardiac disease therefore feels that she can be discharged from this perspective  She does have midline thoracic spine tenderness in the same distribution as her anterior chest pain which may be related though she does have equal upper extremity strength and sensation bilaterally and I do not feel that she requires imaging as she is in process of being worked up for this by her primary care  Have discussed NSAID and Tylenol pain control and discharged patient states she is comfortable with this plan  Have discussed return precautions and she states she understands  Patient states that she has albuterol at home as her pulmonary exam has improved after breathing treatments but states that she still experiencing chest pain therefore feel that this is unrelated                  HEART Risk Score      Most Recent Value   Heart Score Risk Calculator   History  0 Filed at: 09/06/2020 0116   ECG  1 Filed at: 09/06/2020 0116   Age  1 Filed at: 09/06/2020 0116   Risk Factors  1 Filed at: 09/06/2020 0116   Troponin  0 Filed at: 09/06/2020 0116   HEART Score  3 Filed at: 09/06/2020 0116                     EKG: Normal sinus rhythm, normal rate, first-degree AV block, right bundle-branch block,, normal axis, new Q-wave in lead V2 otherwise unchanged from prior                MDM  Number of Diagnoses or Management Options  Diagnosis management comments: Patient is a 61-year-old female past medical history of GERD, bipolar disease presenting with chest pain  Patient is well-appearing at bedside with stable vitals and in no acute distress  She has diminished lung sounds and scattered expiratory wheezes and states that she is unsure whether not this is consistent with her prior asthma exacerbations  She has no other significant physical exam findings  Will obtain cardiac evaluation administer steroids, DuoNebs and reassess  Disposition  Final diagnoses:   Chest pain, unspecified type     Time reflects when diagnosis was documented in both MDM as applicable and the Disposition within this note     Time User Action Codes Description Comment    9/6/2020  1:25 AM Gianluca Pérez [R07 9] Chest pain, unspecified type       ED Disposition     ED Disposition Condition Date/Time Comment    Discharge Stable Sun Sep 6, 2020  1:25 AM Renee Peterson discharge to home/self care  Follow-up Information     Follow up With Specialties Details Why Levi Lantigua MD Pediatrics In 1 week  [address] [address]  [address]  [place] [address]  [phone]            Patient's Medications   Discharge Prescriptions    NAPROXEN (NAPROSYN) 500 MG TABLET    Take 1 tablet (500 mg total) by mouth 2 (two) times a day with meals       Start Date: 9/6/2020  End Date: --       Order Dose: 500 mg       Quantity: 30 tablet    Refills: 0     No discharge procedures on file      PDMP Review     None          ED Provider  Electronically Signed by           Gemini Rosario DO  09/06/20 0127

## 2020-09-08 ENCOUNTER — TELEPHONE (OUTPATIENT)
Dept: GASTROENTEROLOGY | Facility: CLINIC | Age: 50
End: 2020-09-08

## 2020-09-08 RX ORDER — FAMOTIDINE 20 MG/1
20 TABLET, FILM COATED ORAL 2 TIMES DAILY
Qty: 60 TABLET | Refills: 2 | Status: SHIPPED | OUTPATIENT
Start: 2020-09-08 | End: 2021-01-22 | Stop reason: SDUPTHER

## 2020-09-08 NOTE — TELEPHONE ENCOUNTER
Margaret pt-  Patient is experiencing stomach pain which is radiating to her back   She has stomach pressure, acid reflux and diarrhea     She was in the ER on Saturday    Uses: [facility] [phone]  Gerhardt Sep Please phone [phone] to advise

## 2020-09-08 NOTE — TELEPHONE ENCOUNTER
Spoke with patient  History of epigastric pain    Patient c/o continued epigastric pain, GERD, pain radiating to her back  Patient has not been taking dexilant for the last week, she had been over utilizing it  She also understands to take he famotidine 20mg BID, and amitriptyline at HS  Spent 10 minutes with patient reviewing medication regimen  It seems she has trouble keeping up with them and following instructions  Patient will call her pharmacy refill her dexilant, and follow-up in the office next week  Patient is eating and drinking without difficulty  Any other suggestions?

## 2020-09-13 ENCOUNTER — APPOINTMENT (EMERGENCY)
Dept: RADIOLOGY | Facility: HOSPITAL | Age: 50
End: 2020-09-13
Payer: COMMERCIAL

## 2020-09-13 ENCOUNTER — HOSPITAL ENCOUNTER (EMERGENCY)
Facility: HOSPITAL | Age: 50
Discharge: HOME | End: 2020-09-14
Attending: EMERGENCY MEDICINE
Payer: COMMERCIAL

## 2020-09-13 DIAGNOSIS — E87.6 HYPOKALEMIA: ICD-10-CM

## 2020-09-13 DIAGNOSIS — K21.9 GASTROESOPHAGEAL REFLUX DISEASE: ICD-10-CM

## 2020-09-13 DIAGNOSIS — R79.89 ELEVATED TSH: ICD-10-CM

## 2020-09-13 DIAGNOSIS — E01.0 THYROMEGALY: ICD-10-CM

## 2020-09-13 DIAGNOSIS — R07.9 CHEST PAIN: Primary | ICD-10-CM

## 2020-09-13 PROCEDURE — 80048 BASIC METABOLIC PNL TOTAL CA: CPT | Performed by: EMERGENCY MEDICINE

## 2020-09-13 PROCEDURE — 71045 X-RAY EXAM CHEST 1 VIEW: CPT

## 2020-09-13 PROCEDURE — 84439 ASSAY OF FREE THYROXINE: CPT | Performed by: EMERGENCY MEDICINE

## 2020-09-13 PROCEDURE — 84443 ASSAY THYROID STIM HORMONE: CPT | Performed by: EMERGENCY MEDICINE

## 2020-09-13 PROCEDURE — 85025 COMPLETE CBC W/AUTO DIFF WBC: CPT | Performed by: EMERGENCY MEDICINE

## 2020-09-13 PROCEDURE — 99285 EMERGENCY DEPT VISIT HI MDM: CPT

## 2020-09-13 PROCEDURE — 84484 ASSAY OF TROPONIN QUANT: CPT | Performed by: EMERGENCY MEDICINE

## 2020-09-13 PROCEDURE — 85379 FIBRIN DEGRADATION QUANT: CPT | Performed by: EMERGENCY MEDICINE

## 2020-09-13 PROCEDURE — 93005 ELECTROCARDIOGRAM TRACING: CPT

## 2020-09-13 PROCEDURE — 36415 COLL VENOUS BLD VENIPUNCTURE: CPT | Performed by: EMERGENCY MEDICINE

## 2020-09-13 PROCEDURE — 99285 EMERGENCY DEPT VISIT HI MDM: CPT | Performed by: EMERGENCY MEDICINE

## 2020-09-13 PROCEDURE — 84703 CHORIONIC GONADOTROPIN ASSAY: CPT | Performed by: EMERGENCY MEDICINE

## 2020-09-13 RX ORDER — DICYCLOMINE HCL 20 MG
20 TABLET ORAL EVERY 6 HOURS
COMMUNITY
Start: 2020-09-04

## 2020-09-13 RX ORDER — ALBUTEROL SULFATE 90 UG/1
2 AEROSOL, METERED RESPIRATORY (INHALATION) EVERY 4 HOURS PRN
COMMUNITY
Start: 2020-09-02 | End: 2020-10-02

## 2020-09-13 RX ORDER — GABAPENTIN 300 MG/1
300 CAPSULE ORAL 2 TIMES DAILY
COMMUNITY

## 2020-09-13 RX ORDER — METHOCARBAMOL 750 MG/1
750 TABLET, FILM COATED ORAL 3 TIMES DAILY PRN
COMMUNITY

## 2020-09-13 RX ORDER — SODIUM CHLORIDE 9 MG/ML
3 INJECTION INTRAVENOUS
Status: DISCONTINUED | OUTPATIENT
Start: 2020-09-13 | End: 2020-09-14 | Stop reason: HOSPADM

## 2020-09-13 RX ORDER — ONDANSETRON 4 MG/1
4 TABLET, FILM COATED ORAL
COMMUNITY
Start: 2020-08-31

## 2020-09-13 RX ORDER — CITALOPRAM 40 MG/1
40 TABLET ORAL DAILY
COMMUNITY

## 2020-09-14 VITALS
DIASTOLIC BLOOD PRESSURE: 56 MMHG | SYSTOLIC BLOOD PRESSURE: 97 MMHG | WEIGHT: 162.48 LBS | HEIGHT: 67 IN | TEMPERATURE: 98.8 F | BODY MASS INDEX: 25.5 KG/M2 | HEART RATE: 77 BPM | RESPIRATION RATE: 17 BRPM | OXYGEN SATURATION: 97 %

## 2020-09-14 LAB
ANION GAP SERPL CALCULATED.3IONS-SCNC: 9 MMOL/L (ref 4–13)
ATRIAL RATE: 108 BPM
ATRIAL RATE: 76 BPM
BASOPHILS # BLD AUTO: 0.04 THOUSANDS/ΜL (ref 0–0.1)
BASOPHILS NFR BLD AUTO: 0 % (ref 0–1)
BUN SERPL-MCNC: 13 MG/DL (ref 5–25)
CALCIUM SERPL-MCNC: 8.2 MG/DL (ref 8.3–10.1)
CHLORIDE SERPL-SCNC: 102 MMOL/L (ref 100–108)
CO2 SERPL-SCNC: 26 MMOL/L (ref 21–32)
CREAT SERPL-MCNC: 1.03 MG/DL (ref 0.6–1.3)
D DIMER PPP FEU-MCNC: 0.34 UG/ML FEU
EOSINOPHIL # BLD AUTO: 0.16 THOUSAND/ΜL (ref 0–0.61)
EOSINOPHIL NFR BLD AUTO: 1 % (ref 0–6)
ERYTHROCYTE [DISTWIDTH] IN BLOOD BY AUTOMATED COUNT: 11.9 % (ref 11.6–15.1)
GFR SERPL CREATININE-BSD FRML MDRD: 64 ML/MIN/1.73SQ M
GLUCOSE SERPL-MCNC: 100 MG/DL (ref 65–140)
HCG SERPL QL: NEGATIVE
HCT VFR BLD AUTO: 42.8 % (ref 34.8–46.1)
HGB BLD-MCNC: 14.7 G/DL (ref 11.5–15.4)
IMM GRANULOCYTES # BLD AUTO: 0.04 THOUSAND/UL (ref 0–0.2)
IMM GRANULOCYTES NFR BLD AUTO: 0 % (ref 0–2)
LYMPHOCYTES # BLD AUTO: 3.24 THOUSANDS/ΜL (ref 0.6–4.47)
LYMPHOCYTES NFR BLD AUTO: 26 % (ref 14–44)
MCH RBC QN AUTO: 34.2 PG (ref 26.8–34.3)
MCHC RBC AUTO-ENTMCNC: 34.3 G/DL (ref 31.4–37.4)
MCV RBC AUTO: 100 FL (ref 82–98)
MONOCYTES # BLD AUTO: 1.32 THOUSAND/ΜL (ref 0.17–1.22)
MONOCYTES NFR BLD AUTO: 11 % (ref 4–12)
NEUTROPHILS # BLD AUTO: 7.49 THOUSANDS/ΜL (ref 1.85–7.62)
NEUTS SEG NFR BLD AUTO: 62 % (ref 43–75)
NRBC BLD AUTO-RTO: 0 /100 WBCS
P AXIS: 58 DEGREES
P AXIS: 80 DEGREES
PLATELET # BLD AUTO: 308 THOUSANDS/UL (ref 149–390)
PMV BLD AUTO: 9.7 FL (ref 8.9–12.7)
POTASSIUM SERPL-SCNC: 3.4 MMOL/L (ref 3.5–5.3)
PR INTERVAL: 176 MS
PR INTERVAL: 182 MS
QRS AXIS: 79 DEGREES
QRS AXIS: 88 DEGREES
QRSD INTERVAL: 132 MS
QRSD INTERVAL: 134 MS
QT INTERVAL: 354 MS
QT INTERVAL: 412 MS
QTC INTERVAL: 463 MS
QTC INTERVAL: 474 MS
RBC # BLD AUTO: 4.3 MILLION/UL (ref 3.81–5.12)
SODIUM SERPL-SCNC: 137 MMOL/L (ref 136–145)
T WAVE AXIS: 39 DEGREES
T WAVE AXIS: 40 DEGREES
T4 FREE SERPL-MCNC: 0.94 NG/DL (ref 0.76–1.46)
TROPONIN I SERPL-MCNC: <0.02 NG/ML
TROPONIN I SERPL-MCNC: <0.02 NG/ML
TSH SERPL DL<=0.05 MIU/L-ACNC: 4.55 UIU/ML (ref 0.36–3.74)
VENTRICULAR RATE: 108 BPM
VENTRICULAR RATE: 76 BPM
WBC # BLD AUTO: 12.29 THOUSAND/UL (ref 4.31–10.16)

## 2020-09-14 PROCEDURE — 36415 COLL VENOUS BLD VENIPUNCTURE: CPT | Performed by: EMERGENCY MEDICINE

## 2020-09-14 PROCEDURE — 93010 ELECTROCARDIOGRAM REPORT: CPT | Performed by: INTERNAL MEDICINE

## 2020-09-14 PROCEDURE — 93005 ELECTROCARDIOGRAM TRACING: CPT

## 2020-09-14 PROCEDURE — 84484 ASSAY OF TROPONIN QUANT: CPT | Performed by: EMERGENCY MEDICINE

## 2020-09-14 RX ORDER — SUCRALFATE ORAL 1 G/10ML
1 SUSPENSION ORAL
Qty: 420 ML | Refills: 0 | Status: SHIPPED | OUTPATIENT
Start: 2020-09-14 | End: 2020-09-15

## 2020-09-14 RX ORDER — POTASSIUM CHLORIDE 20 MEQ/1
40 TABLET, EXTENDED RELEASE ORAL ONCE
Status: COMPLETED | OUTPATIENT
Start: 2020-09-14 | End: 2020-09-14

## 2020-09-14 RX ORDER — MAGNESIUM HYDROXIDE/ALUMINUM HYDROXICE/SIMETHICONE 120; 1200; 1200 MG/30ML; MG/30ML; MG/30ML
30 SUSPENSION ORAL ONCE
Status: COMPLETED | OUTPATIENT
Start: 2020-09-14 | End: 2020-09-14

## 2020-09-14 RX ORDER — SUCRALFATE ORAL 1 G/10ML
1000 SUSPENSION ORAL ONCE
Status: COMPLETED | OUTPATIENT
Start: 2020-09-14 | End: 2020-09-14

## 2020-09-14 RX ADMIN — SUCRALFATE 1000 MG: 1 SUSPENSION ORAL at 03:05

## 2020-09-14 RX ADMIN — POTASSIUM CHLORIDE 40 MEQ: 1500 TABLET, EXTENDED RELEASE ORAL at 03:11

## 2020-09-14 RX ADMIN — ALUMINUM HYDROXIDE, MAGNESIUM HYDROXIDE, AND SIMETHICONE 30 ML: 200; 200; 20 SUSPENSION ORAL at 03:05

## 2020-09-14 NOTE — ED PROVIDER NOTES
History  Chief Complaint   Patient presents with    Chest Pain     pt c/o midsternal chest pain that started yesterday, pt state the pain radiates into her throat       HPI  52 y o  female presents with 2 days of substernal chest pain with radiation to the neck and back  Patient describes severe sharp pain that came on suddenly while resting and continues in the ER  Patient states breathing worsens the pain and nothing improves the pain  Patient affirms pleuritic component to chest pain  Patient denies exertional component to the chest pain  Patient has a history of reflux and takes medication for this daily  Patient states that the pain today feels different than prior episodes  Patient associates nonproductive cough and denies fever/chills, nausea/vomiting, diaphoresis, dyspnea, hemoptysis, weakness, dizziness, syncope, focal weakness or numbness, leg pain or swelling  All other review of systems reviewed and noted to be negative  The patient denies a history of atherosclerotic disease (CAD/TIA/CVA/PAD)  Patient denies any history of hypertension  Patient denies hyperlipidemia  Patient denies diabetes  Patient denies obesity  Patient denies any early family history of CAD (male less than 51yo or female less than 71 yo)  Patient's mother with MI in 76s  Patient affirms any use of tobacco in the past 90 days  The patient denies any use of illicit drugs, including cocaine  Patient denies any immobilization of at least 3 days or surgery in the past 4 weeks  Patient denies any history of DVT or PE  Patient denies any history or family history of thrombophilia  Patient denies any malignancy with treatment within the past 6 months  Patient denies any use of exogenous estrogen containing compounds  Focused Objective  CV:  Normal inspection with no rash, signs of infection, or trauma  Regular rate and rhythm  No murmur   Peripheral pulses intact and equal   Nontender to palpitation over area of patient's reported pain  Respiratory:  Lungs clear to auscultation bilaterally without adventitious sounds  Skin:  Warm and dry  No rash or signs of herpes zoster over area of patient's reported pain  Extremities:  Non-tender lower extremities without asymmetry; no clinical signs of DVT  No lower extremity edema  Medical Decision Making    Patient presenting with chest pain with a broad differential, including multiple emergent etiologies  Notably patient also does have a tender thyroid that appears enlarged  Patient notes a history of prior shrinkage of her thyroid remotely but does not know more details regarding this  Patient is tachycardic on initial evaluation though she is afebrile and otherwise not exhibiting signs of hyperthyroidism  Will send TSH with reflex regarding this  EKG obtained and reviewed independently by myself, which was interpreted by myself as listed under ED course  Patient placed on cardiac monitoring  Regarding the possibility for ACS, patient's risk stratification by the HEART SCORE:    HEART score:    History 0=Slightly or non-suspicious  ECG 1=Nonspecific repolarization disturbance  Age 1= > 45 - <65 years  Risk Factors 1= 1 or 2 risk factors  Troponin 0= < Normal limit  Total 3      Score 0-3: 1 7% had a MACE risk  0 4% (1 patient)   36 4% of patients were in this low risk group    Score 4-6: 16 6% had a MACE risk    Score 7-10: 50 1% had a MACE risk       The patient's HEART score is 3  CXR will be obtained to evaluate for alternative pathologies, including pneumothorax or pneumonia  Laboratory analysis including troponin to evaluate for ACS, CBC to evaluate for anemia or leukocytosis, and BMP to evaluate renal function and electrolytes considering possibility of cardiac involvement  Patient has symptoms and history concerning for possible pulmonary embolism    Regarding this etiology, patient's risk stratification by the Rosi Jimenez' Criteria for Pulmonary Embolism (Two Tier Model):  no - clinical signs or symptoms of DVT (3)  no - PE most likely diagnosis (3)  yes - Heart rate greater than 100 (1 5)  no - Immobilization at least 3 days OR surgery in the previous 4 weeks (1 5)  no - Previous, objectively diagnosed PE or DVT (1 5)  no - Hemoptysis (1)  no - Malignancy with treatment within 6 months or palliative (1)    Patient's risk further evaluated by the [facility] Criteria for Pulmonary Embolism:  no - age is greater than or equal to 50  yes - Heart rate greater than 100  no - O2 sat on room air < 95%  no - Prior history of PE or DVT  no - Recent trauma or surgery  no - Hemoptysis  no - Use of exogenous estrogen  no - Unilateral leg swelling    Patient has a low risk Wells' criteria but is unable to be cleared via the [facility] criteria  As such, a D-Dimer will be ordered for the patient to evaluate for the potential for pulmonary embolism  If positive, CT imaging of the patient's chest will be obtained to evaluate for potential pulmonary embolism  The patient's HEART score Tomy White 2008) indicates a lower risk regarding the possibility of ACS  In accordance with the HEART pathway Carrie Velásquez 2015), a second troponin will be obtained 3 hours after the initial troponin to evaluate whether the patient is safe for discharge from the emergency department for continued outpatient follow up  Patient on continuous cardiac monitoring and has been instructed to inform nursing with any change in the character or severity of their chest pain so repeat EKG can be obtained          History provided by:  Patient  Chest Pain   Pain location:  Substernal area  Pain quality: sharp    Pain radiates to:  Upper back and neck  Pain radiates to the back: yes    Pain severity:  Severe  Onset quality:  Gradual  Timing:  Constant  Progression:  Worsening  Relieved by:  Nothing  Worsened by:  Deep breathing  Associated symptoms: cough    Associated symptoms: no abdominal pain, no altered mental status, no anxiety, no claudication, no diaphoresis, no dizziness, no dysphagia, no fatigue, no fever, no headache, no heartburn, no lower extremity edema, no nausea, no numbness, no orthopnea, no palpitations, no shortness of breath, no syncope and not vomiting        Prior to Admission Medications   Prescriptions Last Dose Informant Patient Reported? Taking? Dexilant 60 MG capsule   No Yes   Sig: Take 1 capsule (60 mg total) by mouth daily   Multiple Vitamins-Calcium (ONE-A-DAY WOMENS PO)  Self Yes Yes   Sig: Take 1 tablet by mouth daily  NIX CREME RINSE 1 % liquid  Self Yes Yes   Sig: Apply topically one time for 1 dose   Reapply in one week   albuterol (PROVENTIL HFA,VENTOLIN HFA) 90 mcg/act inhaler   Yes Yes   Sig: Inhale 2 puffs every 4 (four) hours as needed   amitriptyline (ELAVIL) 25 mg tablet   No Yes   Sig: Take 1 tablet (25 mg total) by mouth daily at bedtime   citalopram (CeleXA) 40 mg tablet   Yes Yes   Sig: Take 40 mg by mouth daily   dicyclomine (BENTYL) 20 mg tablet   Yes Yes   Sig: Take 20 mg by mouth every 6 (six) hours   divalproex sodium (DEPAKOTE ER) 500 mg 24 hr tablet  Self Yes Yes   Sig: Take 500 mg by mouth 2 (two) times a day   famotidine (PEPCID) 20 mg tablet   No Yes   Sig: Take 1 tablet (20 mg total) by mouth 2 (two) times a day   fluticasone (Flovent HFA) 44 mcg/act inhaler  Self Yes Yes   Sig: Inhale 2 puffs 2 (two) times a day   gabapentin (NEURONTIN) 300 mg capsule   Yes Yes   Sig: Take 300 mg by mouth 2 (two) times a day   hydrocortisone (ANUSOL-HC) 25 mg suppository  Self Yes Yes   Sig: Insert 25 mg into the rectum 2 (two) times a day   loratadine (CLARITIN) 10 mg tablet  Self Yes Yes   Sig: Take 10 mg by mouth daily   methocarbamol (ROBAXIN) 750 mg tablet   Yes Yes   Sig: Take 750 mg by mouth Three times daily as needed   naproxen (NAPROSYN) 500 mg tablet   No Yes   Sig: Take 1 tablet (500 mg total) by mouth 2 (two) times a day with meals   ondansetron (ZOFRAN) 4 mg tablet   Yes Yes   Sig: Take 4 mg by mouth   traZODone (DESYREL) 50 mg tablet  Self Yes Yes   Sig: Take 50 mg by mouth      Facility-Administered Medications: None       Past Medical History:   Diagnosis Date    GERD (gastroesophageal reflux disease)     Psychiatric disorder        Past Surgical History:   Procedure Laterality Date    CHOLECYSTECTOMY      TUBAL LIGATION         History reviewed  No pertinent family history  I have reviewed and agree with the history as documented  E-Cigarette/Vaping    E-Cigarette Use Never User      E-Cigarette/Vaping Substances    Nicotine No     THC No     CBD No     Flavoring No     Other No     Unknown No      Social History     Tobacco Use    Smoking status: Current Every Day Smoker     Packs/day: 1 00     Types: Cigarettes    Smokeless tobacco: Never Used   Substance Use Topics    Alcohol use: No    Drug use: No       Review of Systems   Constitutional: Negative for diaphoresis, fatigue and fever  HENT: Negative for trouble swallowing  Respiratory: Positive for cough  Negative for shortness of breath  Cardiovascular: Positive for chest pain  Negative for palpitations, orthopnea, claudication and syncope  Gastrointestinal: Negative for abdominal pain, heartburn, nausea and vomiting  Neurological: Negative for dizziness, numbness and headaches  All other systems reviewed and are negative  Physical Exam  Physical Exam  Vitals signs reviewed  Constitutional:       Appearance: She is well-developed  Eyes:      Pupils: Pupils are equal, round, and reactive to light  Neck:      Musculoskeletal: Neck supple  Thyroid: Thyromegaly present  Cardiovascular:      Rate and Rhythm: Normal rate and regular rhythm  Pulmonary:      Effort: Pulmonary effort is normal  No tachypnea or respiratory distress  Breath sounds: Normal breath sounds  Abdominal:      Palpations: Abdomen is soft     Musculoskeletal:      Right lower leg: She exhibits no tenderness  No edema  Left lower leg: She exhibits no tenderness  No edema  Comments: No clinical signs of DVT  Skin:     General: Skin is warm and dry  Neurological:      General: No focal deficit present  Mental Status: She is alert     Psychiatric:         Mood and Affect: Mood normal          Vital Signs  ED Triage Vitals   Temperature Pulse Respirations Blood Pressure SpO2   09/13/20 2325 09/13/20 2325 09/13/20 2325 09/13/20 2326 09/13/20 2325   98 8 °F (37 1 °C) (!) 121 20 113/67 96 %      Temp Source Heart Rate Source Patient Position - Orthostatic VS BP Location FiO2 (%)   09/13/20 2325 09/13/20 2325 09/13/20 2325 09/13/20 2325 --   Oral Monitor Sitting Right arm       Pain Score       09/13/20 2325       Worst Possible Pain           Vitals:    09/13/20 2325 09/13/20 2326 09/14/20 0112   BP:  113/67 (!) 99/48   Pulse: (!) 121 (!) 113 75   Patient Position - Orthostatic VS: Sitting  Lying         Visual Acuity      ED Medications  Medications   sodium chloride (PF) 0 9 % injection 3 mL (has no administration in time range)   potassium chloride (K-DUR,KLOR-CON) CR tablet 40 mEq (40 mEq Oral Given 9/14/20 0311)   sucralfate (CARAFATE) oral suspension 1,000 mg (1,000 mg Oral Given 9/14/20 0305)   aluminum-magnesium hydroxide-simethicone (MYLANTA) 200-200-20 mg/5 mL oral suspension 30 mL (30 mL Oral Given 9/14/20 0305)       Diagnostic Studies  Results Reviewed     Procedure Component Value Units Date/Time    Troponin I [[id number]]  (Normal) Collected:  09/14/20 0310    Lab Status:  Final result Specimen:  Blood from Arm, Right Updated:  09/14/20 0335     Troponin I <0 02 ng/mL     Basic metabolic panel [[id number]]  (Abnormal) Collected:  09/13/20 2356    Lab Status:  Final result Specimen:  Blood from Arm, Left Updated:  09/14/20 0027     Sodium 137 mmol/L      Potassium 3 4 mmol/L      Chloride 102 mmol/L      CO2 26 mmol/L      ANION GAP 9 mmol/L      BUN 13 mg/dL Creatinine 1 03 mg/dL      Glucose 100 mg/dL      Calcium 8 2 mg/dL      eGFR 64 ml/min/1 73sq m     Narrative:       [place] guidelines for Chronic Kidney Disease (CKD):     Stage 1 with normal or high GFR (GFR > 90 mL/min/1 73 square meters)    Stage 2 Mild CKD (GFR = 60-89 mL/min/1 73 square meters)    Stage 3A Moderate CKD (GFR = 45-59 mL/min/1 73 square meters)    Stage 3B Moderate CKD (GFR = 30-44 mL/min/1 73 square meters)    Stage 4 Severe CKD (GFR = 15-29 mL/min/1 73 square meters)    Stage 5 End Stage CKD (GFR <15 mL/min/1 73 square meters)  Note: GFR calculation is accurate only with a steady state creatinine    hCG, qualitative pregnancy [[id number]]  (Normal) Collected:  09/13/20 2356    Lab Status:  Final result Specimen:  Blood from Arm, Left Updated:  09/14/20 0027     Preg, Serum Negative    TSH, 3rd generation with Free T4 reflex [[id number]]  (Abnormal) Collected:  09/13/20 2356    Lab Status:  Final result Specimen:  Blood from Arm, Left Updated:  09/14/20 0027     TSH 3RD GENERATON 4 552 uIU/mL     Narrative:       Patients undergoing fluorescein dye angiography may retain small amounts of fluorescein in the body for 48-72 hours post procedure  Samples containing fluorescein can produce falsely depressed TSH values  If the patient had this procedure,a specimen should be resubmitted post fluorescein clearance  vaughn Martin [[id number]] Collected:  09/13/20 2356    Lab Status:   In process Specimen:  Blood from Arm, Left Updated:  09/14/20 0027    Troponin I [[id number]]  (Normal) Collected:  09/13/20 2356    Lab Status:  Final result Specimen:  Blood from Arm, Left Updated:  09/14/20 0022     Troponin I <0 02 ng/mL     D-dimer, quantitative [[id number]]  (Normal) Collected:  09/13/20 2356    Lab Status:  Final result Specimen:  Blood from Arm, Left Updated:  09/14/20 0015     D-Dimer, Quant 0 34 ug/ml FEU     CBC and differential [[id number]]  (Abnormal) Collected: 09/13/20 2356    Lab Status:  Final result Specimen:  Blood from Arm, Left Updated:  09/14/20 0003     WBC 12 29 Thousand/uL      RBC 4 30 Million/uL      Hemoglobin 14 7 g/dL      Hematocrit 42 8 %       fL      MCH 34 2 pg      MCHC 34 3 g/dL      RDW 11 9 %      MPV 9 7 fL      Platelets 019 Thousands/uL      nRBC 0 /100 WBCs      Neutrophils Relative 62 %      Immat GRANS % 0 %      Lymphocytes Relative 26 %      Monocytes Relative 11 %      Eosinophils Relative 1 %      Basophils Relative 0 %      Neutrophils Absolute 7 49 Thousands/µL      Immature Grans Absolute 0 04 Thousand/uL      Lymphocytes Absolute 3 24 Thousands/µL      Monocytes Absolute 1 32 Thousand/µL      Eosinophils Absolute 0 16 Thousand/µL      Basophils Absolute 0 04 Thousands/µL                  X-ray chest 1 view portable   ED Interpretation by Karishma Lockett MD (09/14 0015)   No acute findings  Procedures  Procedures         ED Course  ED Course as of Sep 14 0342   Sary Bee Sep 13, 2020   2335 EKG demonstrates sinus tachycardia at a rate of 108  There are no acute ST segment changes  Prior EKG with prolonged PCR  No delta wave is appreciable  Patient does have a right bundle-branch block  Mon Sep 14, 2020   3236 Not consistent with hyperthyroidism  TSH 3RD [facility](!): 4 552   [id number] Discussed findings with the patient  Discussed mild hypokalemia and continued treatment with fruits and vegetables I described to the patient  Unlikely to be the source of patient's symptoms  Patient with persistent symptoms  Will attempt treatment with Carafate and Mylanta considering initial troponin negative fall patient is awaiting delta troponin  Discussed and emphasized diagnostic uncertainty  Patient with low risk heart score, awaiting delta troponin  Discussed the need for continued follow-up and evaluation considering diagnostic uncertainty  Discussed return precautions in detail    Discussed the need for follow-up on repeat potassium and thyroid testing with patient's primary care physician  Patient has established primary care  Discussed return precautions and emphasized them in detail  0230 Repeat EKG demonstrating normal sinus rhythm at a rate of 76  There is a continued right bundle branch block  This appears similar to the prior EKG  There is no acute ST segment changes  0138 Patient notes symptoms improved following treatment with Carafate of Mylanta  Patient has follow-up with Gastroenterology in 2 days  Discussed contacting them to discussed continued treatment  Discussed diagnostic uncertainty however and emphasized return precautions  Patient agreeable follow with primary care and follow with Gastroenterology  Patient agreeable to return to the emergency with any worsening or change in symptoms  MDM    Disposition  Final diagnoses:   Chest pain   Thyromegaly   Elevated TSH   Hypokalemia   Gastroesophageal reflux disease     Time reflects when diagnosis was documented in both MDM as applicable and the Disposition within this note     Time User Action Codes Description Comment    9/14/2020 12:00 AM Romelle Damián Add [R07 9] Chest pain     9/14/2020 12:00 AM Romelle [place] Add [E01 0] Thyromegaly     9/14/2020 12:52 AM Romelle [place] Add [R79 89] Elevated TSH     9/14/2020  2:20 AM Romelle Damián Add [E87 6] Hypokalemia     9/14/2020  3:41 AM Romelle [place] Add [K21 9] Gastroesophageal reflux disease       ED Disposition     ED Disposition Condition Date/Time Comment    Discharge Stable Mon Sep 14, 2020  2:30 AM [facility] discharge to home/self care  Follow-up Information     Follow up With Specialties Details Why Contact Info Additional Kalpana Beyer MD Pediatrics Schedule an appointment as soon as possible for a visit in 3 days Follow-up and reassessment  Monitoring of potassium and thyroid function    Consideration of referral to Gastroenterology if symptoms persist  Donna Rojas  [address]  [address] [facility] Emergency Department Emergency Medicine Go to  If symptoms worsen [address] [facility] [id number] [phone] [place], [address] [place], [place], [address]          Patient's Medications   Discharge Prescriptions    SUCRALFATE (CARAFATE) 1 G/10 ML SUSPENSION    Take 10 mL (1 g total) by mouth 4 (four) times a day (with meals and at bedtime)       Start Date: 9/14/2020 End Date: --       Order Dose: 1 g       Quantity: 420 mL    Refills: 0     No discharge procedures on file      PDMP Review     None          ED Provider  Electronically Signed by           Venkatesh Pritchett MD  09/14/20 0024

## 2020-09-15 ENCOUNTER — OFFICE VISIT (OUTPATIENT)
Dept: GASTROENTEROLOGY | Facility: CLINIC | Age: 50
End: 2020-09-15
Payer: COMMERCIAL

## 2020-09-15 VITALS
DIASTOLIC BLOOD PRESSURE: 78 MMHG | TEMPERATURE: 97 F | HEART RATE: 104 BPM | HEIGHT: 67 IN | WEIGHT: 157.6 LBS | BODY MASS INDEX: 24.74 KG/M2 | SYSTOLIC BLOOD PRESSURE: 102 MMHG

## 2020-09-15 DIAGNOSIS — R10.13 DYSPEPSIA: ICD-10-CM

## 2020-09-15 DIAGNOSIS — K21.9 GASTROESOPHAGEAL REFLUX DISEASE WITHOUT ESOPHAGITIS: Primary | ICD-10-CM

## 2020-09-15 PROCEDURE — 99213 OFFICE O/P EST LOW 20 MIN: CPT | Performed by: PHYSICIAN ASSISTANT

## 2020-09-15 RX ORDER — SULFAMETHOXAZOLE AND TRIMETHOPRIM 800; 160 MG/1; MG/1
TABLET ORAL
COMMUNITY
Start: 2020-07-02

## 2020-09-15 NOTE — PROGRESS NOTES
[facility] - Outpatient Follow-up Note  Kendall Reid 52 y o  female MRN: [id number]  Encounter: [id number]          ASSESSMENT AND PLAN:      1  Gastroesophageal reflux disease without esophagitis  2  Dyspepsia  Chronic  Driven by her uncontrolled anxiety  She has failed therapy with Reglan, Pantoprazole, Lansoprazole, Esomeprazole, Omeprazole, Dexilant, Pepcid, Zantac, Carafate, Dicyclomine, Hyoscyamine, Celexa, Amitriptyline, Bethanechol, Zofran, Phenergan and cyclobenzaprine    She has had 4 negative EGDs - most recent in July of this year; 4 negative US, 4 negative CT AP, negative GES, negative colonoscopy and is s/p lap tori    At her last appt I recommended a 24hr pH and esophageal manometry but due to social issues was unable to arrange for this  She will continue to try    She is currently on Dexilant 60mg daily, Famotidine 20mg BID, Amitriptyline 25mg qhs and Carafate prn  She continues with symptoms and was in the ER with chest pain on 9/13    She will follow a strict low FODMAP diet    We discussed how I have no other medical therapy to offer at this point  She enjoys tea so I suggested trying chamomile or rosalia tea    ______________________________________________________________________    SUBJECTIVE:  51-year-old female with GERD and functional dyspepsia presents for follow-up  She has suffered with symptoms of abdominal bloating, nausea, chest pain and heartburn for many years  She has undergone extensive testing for this including for EGDs which were all normal most recent of which was in July of 2020  She has undergone multiple ultrasounds of the abdomen, CTs of the abdomen and pelvis all of which have been normal   She had a normal gastric emptying study in 2015  She had a normal colonoscopy in 2015  She is status post laparoscopic cholecystectomy    She has been tried on a multitude of medications in various combinations including Reglan, bethanechol, pantoprazole, lansoprazole, as omeprazole, omeprazole, Dexilant, Zantac, Pepcid, Carafate, Zofran, Phenergan, dicyclomine, hyoscyamine, Celexa, cyclobenzaprine and most recently Elavil all without relief in symptoms  She has underlying depression and anxiety for which she follows with a psychiatrist   At every appointment we tried to discuss her medications however she never has a list with her and is unable to tell me what she is taking  She believes that her psychiatrist just increase the dose of her Celexa from 20 mg a day to 40  She thinks that her anxiety is improving but still uncontrolled  She is currently taking Dexilant 60 mg daily and famotidine 20 mg twice daily  She is also continuing on amitriptyline 25 mg at bedtime  Despite this regimen she was in the emergency room on September 13th with chest pain  She was given a GI cocktail which she does believe helped temporarily  She was discharged with a prescription for Carafate which she has been taking intermittently  Interestingly, she reports that this is helping however at her last appointment in July we discussed how she had been taking Carafate 1 g 4 times daily for several months without relief in symptoms  She has no dysphagia, hematemesis, melena, unexpected weight loss, fevers or chills  REVIEW OF SYSTEMS IS OTHERWISE NEGATIVE  Historical Information   Past Medical History:   Diagnosis Date    GERD (gastroesophageal reflux disease)     Psychiatric disorder      Past Surgical History:   Procedure Laterality Date    CHOLECYSTECTOMY      TUBAL LIGATION       Social History   Social History     Substance and Sexual Activity   Alcohol Use No     Social History     Substance and Sexual Activity   Drug Use No     Social History     Tobacco Use   Smoking Status Current Every Day Smoker    Packs/day: 1 00    Types: Cigarettes   Smokeless Tobacco Never Used     History reviewed  No pertinent family history      Meds/Allergies Current Outpatient Medications:     albuterol (PROVENTIL HFA,VENTOLIN HFA) 90 mcg/act inhaler    amitriptyline (ELAVIL) 25 mg tablet    citalopram (CeleXA) 40 mg tablet    Dexilant 60 MG capsule    divalproex sodium (DEPAKOTE ER) 500 mg 24 hr tablet    famotidine (PEPCID) 20 mg tablet    fluticasone (Flovent HFA) 44 mcg/act inhaler    gabapentin (NEURONTIN) 300 mg capsule    hydrocortisone 2 5 % cream    loratadine (CLARITIN) 10 mg tablet    methocarbamol (ROBAXIN) 750 mg tablet    Multiple Vitamins-Calcium (ONE-A-DAY WOMENS PO)    naproxen (NAPROSYN) 500 mg tablet    NIX CREME RINSE 1 % liquid    ondansetron (ZOFRAN) 4 mg tablet    sulfamethoxazole-trimethoprim (BACTRIM DS) 800-160 mg per tablet    traZODone (DESYREL) 50 mg tablet    dicyclomine (BENTYL) 20 mg tablet    Allergies   Allergen Reactions    Bee Venom     Beeswax     Penicillins      Pt unsure as to what happens            Objective     Blood pressure 102/78, pulse 104, temperature (!) 97 °F (36 1 °C), temperature source Temporal, height 5' 7" (1 702 m), weight 71 5 kg (157 lb 9 6 oz), last menstrual period 09/05/2020  Body mass index is 24 68 kg/m²  PHYSICAL EXAM:      General Appearance:   Alert, cooperative, no distress   HEENT:   Normocephalic, atraumatic, anicteric      Neck:  Supple, symmetrical, trachea midline   Lungs:   Clear to auscultation bilaterally; no rales, rhonchi or wheezing; respirations unlabored    Heart[de-identified]   Regular rate and rhythm; no murmur, rub, or gallop  Abdomen:   Soft, non-tender, non-distended; normal bowel sounds; no masses, no organomegaly    Genitalia:   Deferred    Rectal:   Deferred    Extremities:  No cyanosis, clubbing or edema    Pulses:  2+ and symmetric    Skin:  No jaundice, rashes, or lesions    Lymph nodes:  No palpable cervical lymphadenopathy        Lab Results:   No visits with results within 1 Day(s) from this visit     Latest known visit with results is:   Admission on 09/13/2020, Discharged on 09/14/2020   Component Date Value    WBC 09/13/2020 12 29*    RBC 09/13/2020 4 30     Hemoglobin 09/13/2020 14 7     Hematocrit 09/13/2020 42 8     MCV 09/13/2020 100*    MCH 09/13/2020 34 2     MCHC 09/13/2020 34 3     RDW 09/13/2020 11 9     MPV 09/13/2020 9 7     Platelets 35/45/6494 308     nRBC 09/13/2020 0     Neutrophils Relative 09/13/2020 62     Immat GRANS % 09/13/2020 0     Lymphocytes Relative 09/13/2020 26     Monocytes Relative 09/13/2020 11     Eosinophils Relative 09/13/2020 1     Basophils Relative 09/13/2020 0     Neutrophils Absolute 09/13/2020 7 49     Immature Grans Absolute 09/13/2020 0 04     Lymphocytes Absolute 09/13/2020 3 24     Monocytes Absolute 09/13/2020 1 32*    Eosinophils Absolute 09/13/2020 0 16     Basophils Absolute 09/13/2020 0 04     Sodium 09/13/2020 137     Potassium 09/13/2020 3 4*    Chloride 09/13/2020 102     CO2 09/13/2020 26     ANION GAP 09/13/2020 9     BUN 09/13/2020 13     Creatinine 09/13/2020 1 03     Glucose 09/13/2020 100     Calcium 09/13/2020 8 2*    eGFR 09/13/2020 64     Troponin I 09/13/2020 <0 02     Preg, Serum 09/13/2020 Negative     D-Dimer, Quant 09/13/2020 0 34     TSH 3RD GENERATON 09/13/2020 4 552*    Free T4 09/13/2020 0 94     Troponin I 09/14/2020 <0 02     Ventricular Rate 09/14/2020 76     Atrial Rate 09/14/2020 76     IN Interval 09/14/2020 182     QRSD Interval 09/14/2020 134     QT Interval 09/14/2020 412     QTC Interval 09/14/2020 463     P Axis 09/14/2020 58     QRS Axis 09/14/2020 79     T Wave Axis 09/14/2020 39     Ventricular Rate 09/13/2020 108     Atrial Rate 09/13/2020 108     IN Interval 09/13/2020 176     QRSD Interval 09/13/2020 132     QT Interval 09/13/2020 354     QTC Interval 09/13/2020 474     P Axis 09/13/2020 80     QRS Axis 09/13/2020 88     T Wave Axis 09/13/2020 40          Radiology Results:   X-ray Chest 1 View Portable    Result Date: 9/14/2020  Narrative: CHEST INDICATION:   chest pain  COMPARISON:  9/6/2020 EXAM PERFORMED/VIEWS:  XR CHEST PORTABLE FINDINGS:  Monitoring leads and clips project over the chest  Cardiomediastinal silhouette appears unremarkable  The lungs are clear  No pneumothorax or pleural effusion  Osseous structures appear within normal limits for patient age  Impression: No acute cardiopulmonary disease  Workstation performed: [id number]     Xr Chest 1 View Portable    Result Date: 9/6/2020  Narrative: CHEST INDICATION:   cp  COMPARISON:  Chest radiograph from 11/2/2019  EXAM PERFORMED/VIEWS:  XR CHEST PORTABLE FINDINGS: Cardiomediastinal silhouette appears unremarkable  The lungs are clear  No pneumothorax or pleural effusion  Cholecystectomy  Osseous structures appear within normal limits for patient age  Impression: No acute cardiopulmonary disease   Workstation performed: [id number]

## 2020-09-21 ENCOUNTER — TELEPHONE (OUTPATIENT)
Dept: GASTROENTEROLOGY | Facility: CLINIC | Age: 50
End: 2020-09-21

## 2020-09-21 NOTE — TELEPHONE ENCOUNTER
Called number given, spoke to another person who stated no appt was scheduled   The schedular I spoke to pt has nothing pending ''

## 2020-09-21 NOTE — TELEPHONE ENCOUNTER
Carlos Zhou from Dr [facility] office phoned regarding recently ordered testing for patient  There is a problem with transportation   Please call Malika Barr at [phone] opt 2

## 2020-09-21 NOTE — TELEPHONE ENCOUNTER
rusty - This is Ralsusana Mean from Dr [facility] office called  Please call back and ask to speak with Miles Medico in Dr Rosana Colon office  The phone number is [phone] opt 2  It is important   Thank you ,

## 2020-11-27 ENCOUNTER — TELEPHONE (OUTPATIENT)
Dept: GASTROENTEROLOGY | Facility: CLINIC | Age: 50
End: 2020-11-27

## 2020-12-04 DIAGNOSIS — R10.13 EPIGASTRIC PAIN: ICD-10-CM

## 2020-12-04 RX ORDER — AMITRIPTYLINE HYDROCHLORIDE 25 MG/1
25 TABLET, FILM COATED ORAL
Qty: 30 TABLET | Refills: 3 | Status: SHIPPED | OUTPATIENT
Start: 2020-12-04 | End: 2020-12-10 | Stop reason: SDUPTHER

## 2020-12-07 ENCOUNTER — TELEPHONE (OUTPATIENT)
Dept: GASTROENTEROLOGY | Facility: CLINIC | Age: 50
End: 2020-12-07

## 2020-12-10 DIAGNOSIS — R10.13 EPIGASTRIC PAIN: ICD-10-CM

## 2020-12-10 RX ORDER — AMITRIPTYLINE HYDROCHLORIDE 25 MG/1
25 TABLET, FILM COATED ORAL
Qty: 90 TABLET | Refills: 3 | Status: SHIPPED | OUTPATIENT
Start: 2020-12-10 | End: 2021-03-30 | Stop reason: SDUPTHER

## 2021-01-22 DIAGNOSIS — K21.9 GASTROESOPHAGEAL REFLUX DISEASE WITHOUT ESOPHAGITIS: Primary | ICD-10-CM

## 2021-01-22 RX ORDER — FAMOTIDINE 20 MG/1
20 TABLET, FILM COATED ORAL 2 TIMES DAILY
Qty: 60 TABLET | Refills: 2 | Status: SHIPPED | OUTPATIENT
Start: 2021-01-22 | End: 2021-05-04 | Stop reason: SDUPTHER

## 2021-01-22 NOTE — TELEPHONE ENCOUNTER
Margaret pt-  Patient had been experiencing vomiting & diarrhea yesterday     Today she is experiencing stomach pain    Uses: Shadia Adames [phone]  Please phone [phone] to advise  Chle Ayala

## 2021-01-22 NOTE — TELEPHONE ENCOUNTER
JOVANY: Spoke with patient  History of GERD, Dyspepsia    Patient c/o upper abdominal cramping today, after 1 episode of vomiting, and diarrhea last night  No diarrhea today  Eating and drinking without difficulty  She is taking dexilant daily, famotidine 20mg BID, amitriptyline  She has not utilized Carafate in a few months  Following low FODMAP diet, drinking tea  Advised patient to utilize her Carafate PRN for these episodes, encourage plenty of fluids, and continue low FODMAP diet  She verbalized understanding  Patient is requesting refills on Dexilant, and Famotidine

## 2021-03-25 ENCOUNTER — TELEPHONE (OUTPATIENT)
Dept: GASTROENTEROLOGY | Facility: CLINIC | Age: 51
End: 2021-03-25

## 2021-03-25 NOTE — TELEPHONE ENCOUNTER
Mirtha Gunn patient - Patient is having a lot of pain and discomfort, vomiting  Medications do not seem to be helping stomach  Can something else be ordered in the way of meds  ? Please return call to [phone]   Thxs

## 2021-03-25 NOTE — TELEPHONE ENCOUNTER
JOVANY: Spoke with patient  History of GERD, dyspepsia    Patient c/o upper abdominal cramping, gas pressure, and one episode of vomiting  No vomiting or nausea today  She is taking dexilant daily, famotidine 20mg BID, amitriptyline at HS, and Carafate 1-2 times daily PRN  Following low FODmap diet, drinking plenty of fluids, and her teas  Reassured patient to continue medication regimen, try warm compresses on her abdomen and walking   She will let is know if symptoms persist

## 2021-03-30 DIAGNOSIS — R10.13 EPIGASTRIC PAIN: ICD-10-CM

## 2021-03-30 RX ORDER — AMITRIPTYLINE HYDROCHLORIDE 25 MG/1
25 TABLET, FILM COATED ORAL
Qty: 90 TABLET | Refills: 3 | Status: SHIPPED | OUTPATIENT
Start: 2021-03-30 | End: 2022-04-08 | Stop reason: SDUPTHER

## 2021-03-30 NOTE — TELEPHONE ENCOUNTER
[facility] amitriptyline 25mg tab refill request - rx sent to provider

## 2021-03-31 ENCOUNTER — TRANSCRIBE ORDERS (OUTPATIENT)
Dept: ADMINISTRATIVE | Facility: HOSPITAL | Age: 51
End: 2021-03-31

## 2021-03-31 ENCOUNTER — TELEPHONE (OUTPATIENT)
Dept: GASTROENTEROLOGY | Facility: CLINIC | Age: 51
End: 2021-03-31

## 2021-03-31 DIAGNOSIS — R10.84 ABDOMINAL PAIN, GENERALIZED: Primary | ICD-10-CM

## 2021-05-03 ENCOUNTER — TELEPHONE (OUTPATIENT)
Dept: GASTROENTEROLOGY | Facility: CLINIC | Age: 51
End: 2021-05-03

## 2021-05-03 NOTE — TELEPHONE ENCOUNTER
Dr Consuelo Wade from Dr [facility] office called  Patient's CT of Abd/Pelvis denied  Dr David Nugent would like to speak with Dr Ralf Hercules,  Please call [phone] opp t - There is a note from Dr David Nugent to Dr Ralf Hercules

## 2021-05-04 ENCOUNTER — OFFICE VISIT (OUTPATIENT)
Dept: GASTROENTEROLOGY | Facility: CLINIC | Age: 51
End: 2021-05-04
Payer: COMMERCIAL

## 2021-05-04 VITALS
BODY MASS INDEX: 23.42 KG/M2 | DIASTOLIC BLOOD PRESSURE: 70 MMHG | HEIGHT: 67 IN | SYSTOLIC BLOOD PRESSURE: 116 MMHG | HEART RATE: 105 BPM | WEIGHT: 149.2 LBS

## 2021-05-04 DIAGNOSIS — K59.04 CHRONIC IDIOPATHIC CONSTIPATION: Primary | ICD-10-CM

## 2021-05-04 DIAGNOSIS — K21.9 GASTROESOPHAGEAL REFLUX DISEASE WITHOUT ESOPHAGITIS: ICD-10-CM

## 2021-05-04 DIAGNOSIS — R10.9 FUNCTIONAL ABDOMINAL PAIN SYNDROME: ICD-10-CM

## 2021-05-04 DIAGNOSIS — R10.30 LOWER ABDOMINAL PAIN: ICD-10-CM

## 2021-05-04 PROCEDURE — 99214 OFFICE O/P EST MOD 30 MIN: CPT | Performed by: INTERNAL MEDICINE

## 2021-05-04 RX ORDER — PANTOPRAZOLE SODIUM 40 MG/1
40 TABLET, DELAYED RELEASE ORAL DAILY
COMMUNITY
Start: 2021-03-30 | End: 2021-05-04 | Stop reason: SDUPTHER

## 2021-05-04 RX ORDER — FAMOTIDINE 20 MG/1
20 TABLET, FILM COATED ORAL 2 TIMES DAILY
Qty: 60 TABLET | Refills: 2 | Status: SHIPPED | OUTPATIENT
Start: 2021-05-04 | End: 2021-06-07 | Stop reason: SDUPTHER

## 2021-05-04 RX ORDER — DIVALPROEX SODIUM 125 MG/1
125 TABLET, DELAYED RELEASE ORAL 2 TIMES DAILY
COMMUNITY
Start: 2021-04-10

## 2021-05-04 RX ORDER — PANTOPRAZOLE SODIUM 40 MG/1
40 TABLET, DELAYED RELEASE ORAL DAILY
Qty: 30 TABLET | Refills: 11 | Status: SHIPPED | OUTPATIENT
Start: 2021-05-04 | End: 2021-06-01 | Stop reason: SDUPTHER

## 2021-05-04 RX ORDER — ALBUTEROL SULFATE 90 UG/1
2 AEROSOL, METERED RESPIRATORY (INHALATION) EVERY 6 HOURS PRN
COMMUNITY
Start: 2020-11-24 | End: 2021-11-24

## 2021-05-04 RX ORDER — HYDROXYZINE HYDROCHLORIDE 25 MG/1
TABLET, FILM COATED ORAL
COMMUNITY
Start: 2021-04-14 | End: 2022-01-11

## 2021-05-04 NOTE — PROGRESS NOTES
Follow-up Note -  [facility]  Mychal Chandra 1970 54 y o  female     ASSESSMENT @ PLAN:   She is a 61-year-old female with functional abdominal pain syndrome with gastroesophageal reflux disease and IBS who is here for follow-up  She reports gnawing abdominal pain which is functional in nature  I agree with our physician assistant Edy Hoffman- the patient has exhausted all testing and treatment options  1   Primarily we should focus on supportive care and treatment of her anxiety and treatments such as meditation in order to help her cope with her symptoms    2 she can continue on her Protonix and Pepcid combination    Endless testing and phone calls and trials of medication will not benefit this patient    Below I am pasting her last note summarizing her failed treatments    "She has failed therapy with Reglan, Pantoprazole, Lansoprazole, Esomeprazole, Omeprazole, Dexilant, Pepcid, Zantac, Carafate, Dicyclomine, Hyoscyamine, Celexa, Amitriptyline, Bethanechol, Zofran, Phenergan and cyclobenzaprine    She has had 4 negative EGDs - most recent in July of this year; 4 negative US, 4 negative CT AP, negative GES, negative colonoscopy and is s/p lap tori     At her last appt I recommended a 24hr pH and esophageal manometry but due to social issues was unable to arrange for this  She will continue to try    She is currently on Dexilant 60mg daily, Famotidine 20mg BID, Amitriptyline 25mg qhs and Carafate prn  She continues with symptoms and was in the ER with chest pain on 9/13     She will follow a strict low FODMAP diet     We discussed how I have no other medical therapy to offer at this point"        Reason:  Abdominal pain    HPI:  She is a 61-year-old female with a long history of functional abdominal pain  This is driven by anxiety  The patient is essentially here is a 2nd opinion  The patient has had a lot of testing  She has had 4- endoscopies    Her most recent endoscopy was in July of 2020  She has had 4 other endoscopies  She has had 4- ultrasounds  She has had a negative CT scan  She has had negative colonoscopy  She has had her gallbladder removed  She has had a negative gastric emptying study  Essentially she has never had an abnormal test   She has failed every medication  In [facility] last note:  She reports that she has failed pantoprazole lansoprazole Nexium Pepcid Zantac Carafate dicyclomine hyoscyamine the Celexa Welchol Zofran Phenergan Flexeril Pepcid omeprazole Dexilant amitriptyline and bethanechol  She has no melena hematochezia  She reports uncontrolled stress and anxiety  She reports loss of her children  She reports that she is seeing Psychiatry is on multiple psychiatric medications  She tells me that she is following the FODMAP diet  She has no alarm symptoms  REVIEW OF SYSTEMS:     CONSTITUTIONAL: Denies any fever, chills, or rigors  Good appetite, and no recent weight loss  HEENT: No earache or tinnitus  Denies hearing loss or visual disturbances  CARDIOVASCULAR: No chest pain or palpitations  RESPIRATORY: Denies any cough, hemoptysis, shortness of breath or dyspnea on exertion  GASTROINTESTINAL: As noted in the History of Present Illness  GENITOURINARY: No problems with urination  Denies any hematuria or dysuria  NEUROLOGIC: No dizziness or vertigo, denies headaches  MUSCULOSKELETAL: Denies any muscle or joint pain  SKIN: Denies skin rashes or itching  ENDOCRINE: Denies excessive thirst  Denies intolerance to heat or cold  PSYCHOSOCIAL: Denies depression or anxiety  Denies any recent memory loss       Past Medical History:   Diagnosis Date    GERD (gastroesophageal reflux disease)     Psychiatric disorder       Past Surgical History:   Procedure Laterality Date    CHOLECYSTECTOMY      TUBAL LIGATION       Social History     Socioeconomic History    Marital status:      Spouse name: Not on file    Number of children: Not on file    Years of education: Not on file    Highest education level: Not on file   Occupational History    Not on file   Social Needs    Financial resource strain: Not on file    Food insecurity     Worry: Not on file     Inability: Not on file    Transportation needs     Medical: Not on file     Non-medical: Not on file   Tobacco Use    Smoking status: Current Every Day Smoker     Packs/day: 1 00     Types: Cigarettes    Smokeless tobacco: Never Used   Substance and Sexual Activity    Alcohol use: No    Drug use: No    Sexual activity: Not on file   Lifestyle    Physical activity     Days per week: Not on file     Minutes per session: Not on file    Stress: Not on file   Relationships    Social connections     Talks on phone: Not on file     Gets together: Not on file     Attends Orthodox service: Not on file     Active member of club or organization: Not on file     Attends meetings of clubs or organizations: Not on file     Relationship status: Not on file    Intimate partner violence     Fear of current or ex partner: Not on file     Emotionally abused: Not on file     Physically abused: Not on file     Forced sexual activity: Not on file   Other Topics Concern    Not on file   Social History Narrative    Not on file     History reviewed  No pertinent family history    Bee venom, Beeswax, and Penicillins  Current Outpatient Medications   Medication Sig Dispense Refill    albuterol (PROVENTIL HFA,VENTOLIN HFA) 90 mcg/act inhaler Inhale 2 puffs every 6 (six) hours as needed      amitriptyline (ELAVIL) 25 mg tablet Take 1 tablet (25 mg total) by mouth daily at bedtime 90 tablet 3    citalopram (CeleXA) 40 mg tablet Take 40 mg by mouth daily      Diclofenac Sodium (VOLTAREN) 1 % Apply 1 application topically 4 (four) times a day      dicyclomine (BENTYL) 20 mg tablet Take 20 mg by mouth every 6 (six) hours      divalproex sodium (DEPAKOTE ER) 500 mg 24 hr tablet Take 500 mg by mouth 2 (two) times a day  2    divalproex sodium (DEPAKOTE) 125 mg EC tablet Take 125 mg by mouth 2 (two) times a day      famotidine (PEPCID) 20 mg tablet Take 1 tablet (20 mg total) by mouth 2 (two) times a day 60 tablet 2    fluticasone (Flovent HFA) 44 mcg/act inhaler Inhale 2 puffs 2 (two) times a day      gabapentin (NEURONTIN) 300 mg capsule Take 300 mg by mouth 2 (two) times a day      hydrocortisone 2 5 % cream Insert into the rectum 2 (two) times a day   hydrOXYzine HCL (ATARAX) 25 mg tablet Take one (1) tablet by mouth three times a day, as needed for anxiety      loratadine (CLARITIN) 10 mg tablet Take 10 mg by mouth daily  5    methocarbamol (ROBAXIN) 750 mg tablet Take 750 mg by mouth Three times daily as needed      Multiple Vitamins-Calcium (ONE-A-DAY WOMENS PO) Take 1 tablet by mouth daily  11    naproxen (NAPROSYN) 500 mg tablet Take 1 tablet (500 mg total) by mouth 2 (two) times a day with meals 30 tablet 0    NIX CREME RINSE 1 % liquid Apply topically one time for 1 dose  Reapply in one week  2    ondansetron (ZOFRAN) 4 mg tablet Take 4 mg by mouth      pantoprazole (PROTONIX) 40 mg tablet Take 1 tablet (40 mg total) by mouth daily 30 tablet 11    sulfamethoxazole-trimethoprim (BACTRIM DS) 800-160 mg per tablet TAKE ONE TABLET BY MOUTH TWICE DAILY FOR 7 DAYS      traZODone (DESYREL) 50 mg tablet Take 50 mg by mouth       No current facility-administered medications for this visit  Blood pressure 116/70, pulse 105, height 5' 7" (1 702 m), weight 67 7 kg (149 lb 3 2 oz)      PHYSICAL EXAM:     General Appearance:   Alert, cooperative, no distress, appears stated age    HEENT:   Normocephalic, atraumatic, anicteric      Neck:  Supple, symmetrical, trachea midline, no adenopathy;    thyroid: no enlargement/tenderness/nodules; no carotid  bruit or JVD    Lungs:   Clear to auscultation bilaterally; no rales, rhonchi or wheezing; respirations unlabored    Heart[de-identified]   S1 and S2 normal; regular rate and rhythm; no murmur, rub, or gallop     Abdomen:   Soft, non-tender, non-distended; normal bowel sounds; no masses, no organomegaly    Genitalia:   Deferred    Rectal:   Deferred    Extremities:  No cyanosis, clubbing or edema    Pulses:  2+ and symmetric all extremities    Skin:  Skin color, texture, turgor normal, no rashes or lesions    Lymph nodes:  No palpable cervical, axillary or inguinal lymphadenopathy        Lab Results   Component Value Date    WBC 12 29 (H) 09/13/2020    HGB 14 7 09/13/2020    HCT 42 8 09/13/2020     (H) 09/13/2020     09/13/2020     Lab Results   Component Value Date    CALCIUM 8 2 (L) 09/13/2020    K 3 4 (L) 09/13/2020    CO2 26 09/13/2020     09/13/2020    BUN 13 09/13/2020    CREATININE 1 03 09/13/2020     Lab Results   Component Value Date    ALT 11 (L) 09/06/2020    AST 8 09/06/2020    ALKPHOS 50 09/06/2020     Lab Results   Component Value Date    INR 0 98 11/02/2019    PROTIME 13 0 11/02/2019

## 2021-05-05 NOTE — TELEPHONE ENCOUNTER
Spoke with Gwendolyn Forrest at Dr Danya Berrios office she stated to disregard the message patient will be making an appointment with a GI doctor she was previously seeing

## 2021-05-05 NOTE — TELEPHONE ENCOUNTER
Rafat duran - Meggan Simms from Dr Dick Saldaña office called again this am and wanted Dr Malcolm Saleh to return the call to [phone] (option 2) to discuss ordering a CT Scan if needed as she had ordered a CT scan and the patient's insurance denied it    thx

## 2021-05-28 ENCOUNTER — OFFICE VISIT (OUTPATIENT)
Dept: GASTROENTEROLOGY | Facility: CLINIC | Age: 51
End: 2021-05-28
Payer: COMMERCIAL

## 2021-05-28 VITALS
DIASTOLIC BLOOD PRESSURE: 80 MMHG | HEIGHT: 67 IN | BODY MASS INDEX: 23.23 KG/M2 | SYSTOLIC BLOOD PRESSURE: 122 MMHG | HEART RATE: 102 BPM | WEIGHT: 148 LBS

## 2021-05-28 DIAGNOSIS — R10.13 EPIGASTRIC PAIN: Primary | ICD-10-CM

## 2021-05-28 DIAGNOSIS — K21.9 GASTROESOPHAGEAL REFLUX DISEASE, UNSPECIFIED WHETHER ESOPHAGITIS PRESENT: ICD-10-CM

## 2021-05-28 PROCEDURE — 99214 OFFICE O/P EST MOD 30 MIN: CPT | Performed by: INTERNAL MEDICINE

## 2021-05-28 NOTE — PROGRESS NOTES
[facility]      Chief Complaint:   GERD    HPI:  River Hay is a 54 y o   female who presents with  A long history of GERD symptomatology  She is currently having high epigastric and substernal discomfort radiating into her back  There is no apparent exacerbating or remitting factor for this  The patient underwent an EGD in July 2020 elsewhere  This was negative with negative biopsies  They had recommended a CT and manometry study but the patient's insurance apparently denied it according to her  She is not having any dysphagia at present  She has no nausea or vomiting  No weight loss  No fever or chills  No melena or hematochezia  There is no family history of known GI disease  Patient currently is taking Protonix in the morning and Pepcid in the evening  She does not eat near bedtime  She drinks some coffee in the morning  She has occasional chocolate  However she smokes on a daily basis  She has no other complaints at the present time  No other definitive exacerbating or remitting factors for discomfort  No nonsteroidal use         Review of Systems:   Constitutional: No fever or chills, feels well, no tiredness, no recent weight gain or weight loss  HENT: No complaints of earache, no hearing loss, no nosebleeds, no nasal discharge, no sore throat, no hoarseness  Eyes: No complaints of eye pain, no red eyes, no discharge from eyes, no itchy eyes  Cardiovascular: No complaints of slow heart rate, no fast heart rate, no chest pain, no palpitations, no leg claudication, no lower extremity edema  Respiratory: No complaints of shortness of breath, no wheezing, no cough, no SOB on exertion, no orthopnea  Gastrointestinal: As noted in HPI  Genitourinary: No complaints of dysuria, no incontinence, no hesitancy, no nocturia  Musculoskeletal: No complaints of arthralgia, no myalgias, no joint swelling or stiffness, no limb pain or swelling     Neurological: No complaints of headache, no confusion, no convulsions, no numbness or tingling, no dizziness or fainting, no limb weakness, no difficulty walking  Skin: No complaints of skin rash or skin lesions, no itching, no skin wound, no dry skin  Hematological/Lymphatic: No complaints of swollen glands, does not bleed easy  Allergic/Immunologic: No immunocompromised state  Endocrine:  No complaints of polyuria, no polydipsia  Psychiatric/Behavioral: is not suicidal, no sleep disturbances, no anxiety or depression, no change in personality, no emotional problems  Historical Information   Past Medical History:   Diagnosis Date    GERD (gastroesophageal reflux disease)     Psychiatric disorder      Past Surgical History:   Procedure Laterality Date    CHOLECYSTECTOMY      TUBAL LIGATION       Social History   Social History     Substance and Sexual Activity   Alcohol Use No     Social History     Substance and Sexual Activity   Drug Use No     Social History     Tobacco Use   Smoking Status Current Every Day Smoker    Packs/day: 1 00    Types: Cigarettes   Smokeless Tobacco Never Used     History reviewed  No pertinent family history  Current Medications: has a current medication list which includes the following prescription(s): albuterol, amitriptyline, citalopram, diclofenac sodium, dicyclomine, divalproex sodium, divalproex sodium, famotidine, fluticasone, gabapentin, hydrocortisone, hydroxyzine hcl, loratadine, methocarbamol, multiple vitamins-minerals, naproxen, nix creme rinse, ondansetron, pantoprazole, sulfamethoxazole-trimethoprim, and trazodone  Vital Signs: /80   Pulse 102   Ht 5' 7" (1 702 m)   Wt 67 1 kg (148 lb)   BMI 23 18 kg/m²       Physical Exam:   Constitutional  General Appearance: No acute distress, well appearing and well nourished  Head  Normocephalic  Eyes  Conjunctivae and lids: No swelling, erythema, or discharge      Pupils and irises: Equal, round and reactive to light  Ears, Nose, Mouth, and Throat  External inspection of ears and nose: Normal  Nasal mucosa, septum and turbinates: Normal without edema or erythema/   Oropharynx: Normal with no erythema, edema, exudate or lesions  Neck  Normal range of motion  Neck supple  Cardiovascular  Auscultation of the heart: Normal rate and rhythm, normal S1 and S2 without murmurs  Examination of the extremities for edema and/or varicosities: Normal  Pulmonary/Chest  Respiratory effort: No increased work of breathing or signs of respiratory distress  Auscultation of lungs: Clear to auscultation, equal breath sounds bilaterally, no wheezes, rales, no rhonchi  Abdomen  Abdomen: Non-tender, no masses  Liver and spleen: No hepatomegaly or splenomegaly  Musculoskeletal  Gait and station: normal   Digits and Nails: normal without clubbing or cyanosis  Inspection/palpation of joints, bones, and muscles: Normal  Neurological  No nystagmus or asterixis  Skin  Skin and subcutaneous tissue: Normal without rashes or lesions  Lymphatic  Palpation of the lymph nodes in neck: No lymphadenopathy  Psychiatric  Orientation to person, place and time: Normal   Mood and affect: Normal          Labs:  Lab Results   Component Value Date    ALT 11 (L) 09/06/2020    AST 8 09/06/2020    BUN 13 09/13/2020    CALCIUM 8 2 (L) 09/13/2020     09/13/2020    CO2 26 09/13/2020    CREATININE 1 03 09/13/2020    HCT 42 8 09/13/2020    HGB 14 7 09/13/2020     09/13/2020    K 3 4 (L) 09/13/2020    WBC 12 29 (H) 09/13/2020         X-Rays & Procedures:   US abdomen limited    (Results Pending)           ______________________________________________________________________      Assessment & Plan:     Diagnoses and all orders for this visit:    Epigastric pain  -     Occult Blood, Fecal Immunochemical; Future  -     US abdomen limited;  Future    Gastroesophageal reflux disease, unspecified whether esophagitis present  -     Occult Blood, Fecal Immunochemical; Future  -     US abdomen limited; Future       from the diagnostic point of view we will obtain an ultrasound because of the radiation of her high epigastric pain into her back  We will also obtain a fecal immunochemical test   From a therapeutic point of view I have asked that she increase her pantoprazole to b i d  1/2 hour before breakfast and dinner, hold her Pepcid, and obtain a wedge  I also strongly recommended that she contact her primary care physician for smoking cessation and explained the necessity for this  Patient agrees  She will call me after obtaining her studies for the results and to go over her progress

## 2021-06-01 DIAGNOSIS — K21.9 GASTROESOPHAGEAL REFLUX DISEASE WITHOUT ESOPHAGITIS: ICD-10-CM

## 2021-06-01 RX ORDER — PANTOPRAZOLE SODIUM 40 MG/1
TABLET, DELAYED RELEASE ORAL
Qty: 60 TABLET | Refills: 11 | Status: SHIPPED | OUTPATIENT
Start: 2021-06-01

## 2021-06-04 ENCOUNTER — HOSPITAL ENCOUNTER (OUTPATIENT)
Dept: ULTRASOUND IMAGING | Facility: CLINIC | Age: 51
Discharge: HOME | End: 2021-06-04
Payer: COMMERCIAL

## 2021-06-04 DIAGNOSIS — R10.13 EPIGASTRIC PAIN: ICD-10-CM

## 2021-06-04 DIAGNOSIS — K21.9 GASTROESOPHAGEAL REFLUX DISEASE, UNSPECIFIED WHETHER ESOPHAGITIS PRESENT: ICD-10-CM

## 2021-06-04 PROCEDURE — 76705 ECHO EXAM OF ABDOMEN: CPT

## 2021-06-07 DIAGNOSIS — K21.9 GASTROESOPHAGEAL REFLUX DISEASE WITHOUT ESOPHAGITIS: ICD-10-CM

## 2021-06-07 RX ORDER — FAMOTIDINE 20 MG/1
20 TABLET, FILM COATED ORAL 2 TIMES DAILY
Qty: 60 TABLET | Refills: 2 | Status: SHIPPED | OUTPATIENT
Start: 2021-06-07 | End: 2021-09-30 | Stop reason: SDUPTHER

## 2021-06-09 ENCOUNTER — TELEPHONE (OUTPATIENT)
Dept: GASTROENTEROLOGY | Facility: CLINIC | Age: 51
End: 2021-06-09

## 2021-06-09 NOTE — TELEPHONE ENCOUNTER
Please tell her the ultrasound was normal, and to please make sure she does the stool test for blood

## 2021-07-13 ENCOUNTER — TELEPHONE (OUTPATIENT)
Dept: GASTROENTEROLOGY | Facility: CLINIC | Age: 51
End: 2021-07-13

## 2021-07-13 NOTE — TELEPHONE ENCOUNTER
JOVANY: Spoke with patient  History of GERD    Patient c/o epigastric soreness since going to [place]  She Iis taking pantoprazole 40mg BID, and pepcid BID  She has not quit smoking, she will speak to her PCP about this today  Stool testing is not done  Reviewed GERD diet  Advised patient okay to utilize PRN TUMs, or mylanta PRN  She will follow-up with testing

## 2021-08-02 ENCOUNTER — HOSPITAL ENCOUNTER (EMERGENCY)
Facility: HOSPITAL | Age: 51
Discharge: HOME | End: 2021-08-02
Attending: EMERGENCY MEDICINE | Admitting: EMERGENCY MEDICINE
Payer: COMMERCIAL

## 2021-08-02 ENCOUNTER — APPOINTMENT (EMERGENCY)
Dept: RADIOLOGY | Facility: HOSPITAL | Age: 51
End: 2021-08-02
Payer: COMMERCIAL

## 2021-08-02 VITALS
BODY MASS INDEX: 23.39 KG/M2 | SYSTOLIC BLOOD PRESSURE: 120 MMHG | RESPIRATION RATE: 16 BRPM | WEIGHT: 149 LBS | HEIGHT: 67 IN | DIASTOLIC BLOOD PRESSURE: 63 MMHG | OXYGEN SATURATION: 100 % | TEMPERATURE: 98.4 F | HEART RATE: 85 BPM

## 2021-08-02 DIAGNOSIS — M25.561 ACUTE PAIN OF RIGHT KNEE: Primary | ICD-10-CM

## 2021-08-02 LAB
ALBUMIN SERPL BCP-MCNC: 3.4 G/DL (ref 3.5–5)
ALP SERPL-CCNC: 49 U/L (ref 46–116)
ALT SERPL W P-5'-P-CCNC: 14 U/L (ref 12–78)
ANION GAP SERPL CALCULATED.3IONS-SCNC: 5 MMOL/L (ref 4–13)
APPEARANCE FLD: ABNORMAL
AST SERPL W P-5'-P-CCNC: 12 U/L (ref 5–45)
BASOPHILS # BLD AUTO: 0.04 THOUSANDS/ΜL (ref 0–0.1)
BASOPHILS NFR BLD AUTO: 1 % (ref 0–1)
BILIRUB SERPL-MCNC: 0.23 MG/DL (ref 0.2–1)
BUN SERPL-MCNC: 18 MG/DL (ref 5–25)
CALCIUM ALBUM COR SERPL-MCNC: 9.2 MG/DL (ref 8.3–10.1)
CALCIUM SERPL-MCNC: 8.7 MG/DL (ref 8.3–10.1)
CHLORIDE SERPL-SCNC: 106 MMOL/L (ref 100–108)
CO2 SERPL-SCNC: 29 MMOL/L (ref 21–32)
COLOR FLD: ABNORMAL
CREAT SERPL-MCNC: 0.84 MG/DL (ref 0.6–1.3)
CRP SERPL QL: 4.2 MG/L
EOSINOPHIL # BLD AUTO: 0.1 THOUSAND/ΜL (ref 0–0.61)
EOSINOPHIL NFR BLD AUTO: 1 % (ref 0–6)
EOSINOPHIL NFR FLD MANUAL: 2 %
ERYTHROCYTE [DISTWIDTH] IN BLOOD BY AUTOMATED COUNT: 11.9 % (ref 11.6–15.1)
ERYTHROCYTE [SEDIMENTATION RATE] IN BLOOD: 2 MM/HOUR (ref 0–19)
GFR SERPL CREATININE-BSD FRML MDRD: 82 ML/MIN/1.73SQ M
GLUCOSE SERPL-MCNC: 104 MG/DL (ref 65–140)
HCT VFR BLD AUTO: 38.8 % (ref 34.8–46.1)
HGB BLD-MCNC: 12.9 G/DL (ref 11.5–15.4)
IMM GRANULOCYTES # BLD AUTO: 0.01 THOUSAND/UL (ref 0–0.2)
IMM GRANULOCYTES NFR BLD AUTO: 0 % (ref 0–2)
LYMPHOCYTES # BLD AUTO: 2.48 THOUSANDS/ΜL (ref 0.6–4.47)
LYMPHOCYTES NFR BLD AUTO: 10 %
LYMPHOCYTES NFR BLD AUTO: 30 % (ref 14–44)
MCH RBC QN AUTO: 33.9 PG (ref 26.8–34.3)
MCHC RBC AUTO-ENTMCNC: 33.2 G/DL (ref 31.4–37.4)
MCV RBC AUTO: 102 FL (ref 82–98)
MONOCYTES # BLD AUTO: 0.78 THOUSAND/ΜL (ref 0.17–1.22)
MONOCYTES NFR BLD AUTO: 10 %
MONOCYTES NFR BLD AUTO: 9 % (ref 4–12)
NEUTROPHILS # BLD AUTO: 4.86 THOUSANDS/ΜL (ref 1.85–7.62)
NEUTS SEG NFR BLD AUTO: 59 % (ref 43–75)
NEUTS SEG NFR BLD AUTO: 78 %
NRBC BLD AUTO-RTO: 0 /100 WBCS
PLATELET # BLD AUTO: 250 THOUSANDS/UL (ref 149–390)
PMV BLD AUTO: 9.2 FL (ref 8.9–12.7)
POTASSIUM SERPL-SCNC: 4.3 MMOL/L (ref 3.5–5.3)
PROT SERPL-MCNC: 6.4 G/DL (ref 6.4–8.2)
RBC # BLD AUTO: 3.8 MILLION/UL (ref 3.81–5.12)
SITE: ABNORMAL
SODIUM SERPL-SCNC: 140 MMOL/L (ref 136–145)
TOTAL CELLS COUNTED SPEC: 100
URATE SERPL-MCNC: 2.9 MG/DL (ref 2–6.8)
WBC # BLD AUTO: 8.27 THOUSAND/UL (ref 4.31–10.16)
WBC # FLD MANUAL: 1363 /UL (ref 0–200)

## 2021-08-02 PROCEDURE — 86140 C-REACTIVE PROTEIN: CPT | Performed by: EMERGENCY MEDICINE

## 2021-08-02 PROCEDURE — 96372 THER/PROPH/DIAG INJ SC/IM: CPT

## 2021-08-02 PROCEDURE — 87205 SMEAR GRAM STAIN: CPT | Performed by: EMERGENCY MEDICINE

## 2021-08-02 PROCEDURE — 99284 EMERGENCY DEPT VISIT MOD MDM: CPT

## 2021-08-02 PROCEDURE — 80053 COMPREHEN METABOLIC PANEL: CPT | Performed by: EMERGENCY MEDICINE

## 2021-08-02 PROCEDURE — 73564 X-RAY EXAM KNEE 4 OR MORE: CPT

## 2021-08-02 PROCEDURE — 87070 CULTURE OTHR SPECIMN AEROBIC: CPT | Performed by: EMERGENCY MEDICINE

## 2021-08-02 PROCEDURE — 85652 RBC SED RATE AUTOMATED: CPT | Performed by: EMERGENCY MEDICINE

## 2021-08-02 PROCEDURE — 36415 COLL VENOUS BLD VENIPUNCTURE: CPT | Performed by: EMERGENCY MEDICINE

## 2021-08-02 PROCEDURE — 84550 ASSAY OF BLOOD/URIC ACID: CPT | Performed by: EMERGENCY MEDICINE

## 2021-08-02 PROCEDURE — 85025 COMPLETE CBC W/AUTO DIFF WBC: CPT | Performed by: EMERGENCY MEDICINE

## 2021-08-02 PROCEDURE — 89051 BODY FLUID CELL COUNT: CPT | Performed by: EMERGENCY MEDICINE

## 2021-08-02 PROCEDURE — 99284 EMERGENCY DEPT VISIT MOD MDM: CPT | Performed by: EMERGENCY MEDICINE

## 2021-08-02 PROCEDURE — 20610 DRAIN/INJ JOINT/BURSA W/O US: CPT | Performed by: EMERGENCY MEDICINE

## 2021-08-02 RX ORDER — ACETAMINOPHEN 325 MG/1
975 TABLET ORAL ONCE
Status: COMPLETED | OUTPATIENT
Start: 2021-08-02 | End: 2021-08-02

## 2021-08-02 RX ORDER — OXYCODONE HYDROCHLORIDE 5 MG/1
5 TABLET ORAL ONCE
Status: COMPLETED | OUTPATIENT
Start: 2021-08-02 | End: 2021-08-02

## 2021-08-02 RX ORDER — KETOROLAC TROMETHAMINE 30 MG/ML
15 INJECTION, SOLUTION INTRAMUSCULAR; INTRAVENOUS ONCE
Status: COMPLETED | OUTPATIENT
Start: 2021-08-02 | End: 2021-08-02

## 2021-08-02 RX ADMIN — OXYCODONE HYDROCHLORIDE 5 MG: 5 TABLET ORAL at 12:44

## 2021-08-02 RX ADMIN — ACETAMINOPHEN 975 MG: 325 TABLET, FILM COATED ORAL at 12:14

## 2021-08-02 RX ADMIN — KETOROLAC TROMETHAMINE 15 MG: 30 INJECTION, SOLUTION INTRAMUSCULAR at 12:15

## 2021-08-02 NOTE — ED PROVIDER NOTES
History  Chief Complaint   Patient presents with    Knee Swelling     Pt reports right knee swelling over the past 4 days  Was seen at [facility], where they r/o DVT, and was discahrged  States pain and swelling persist  Denies initial injury  Patient is a 68-year-old female past medical history of chronic pain, GERD, bipolar disorder presenting with right knee pain  Patient states the last 4 days he has had worsening global ready pain and swelling which is a stretching/tearing pain, constant, radiating to her foot worse with pressure to the knee  States that she has been 200 mg of ibuprofen, last dose 3 hours ago with minimal relief  She denies any injury she is aware of  Has PCP follow-up tomorrow  States that she was seen at outside hospital for this yesterday and had a DVT ultrasound that did not show any blood clot  She denies any numbness or tingling, fevers, chest pain, shortness of breath  Has never had this pain before  Prior to Admission Medications   Prescriptions Last Dose Informant Patient Reported? Taking? Diclofenac Sodium (VOLTAREN) 1 %  Self Yes No   Sig: Apply 1 application topically 4 (four) times a day   Multiple Vitamins-Calcium (ONE-A-DAY WOMENS PO)  Self Yes No   Sig: Take 1 tablet by mouth daily  NIX CREME RINSE 1 % liquid  Self Yes No   Sig: Apply topically one time for 1 dose   Reapply in one week   albuterol (PROVENTIL HFA,VENTOLIN HFA) 90 mcg/act inhaler  Self Yes No   Sig: Inhale 2 puffs every 6 (six) hours as needed   amitriptyline (ELAVIL) 25 mg tablet  Self No No   Sig: Take 1 tablet (25 mg total) by mouth daily at bedtime   citalopram (CeleXA) 40 mg tablet  Self Yes No   Sig: Take 40 mg by mouth daily   dicyclomine (BENTYL) 20 mg tablet  Self Yes No   Sig: Take 20 mg by mouth every 6 (six) hours   divalproex sodium (DEPAKOTE ER) 500 mg 24 hr tablet  Self Yes No   Sig: Take 500 mg by mouth 2 (two) times a day   divalproex sodium (DEPAKOTE) 125 mg EC tablet  Self Yes No   Sig: Take 125 mg by mouth 2 (two) times a day   famotidine (PEPCID) 20 mg tablet   No No   Sig: Take 1 tablet (20 mg total) by mouth 2 (two) times a day   fluticasone (Flovent HFA) 44 mcg/act inhaler  Self Yes No   Sig: Inhale 2 puffs 2 (two) times a day   gabapentin (NEURONTIN) 300 mg capsule  Self Yes No   Sig: Take 300 mg by mouth 2 (two) times a day   hydrOXYzine HCL (ATARAX) 25 mg tablet  Self Yes No   Sig: Take one (1) tablet by mouth three times a day, as needed for anxiety   hydrocortisone 2 5 % cream  Self Yes No   Sig: Insert into the rectum 2 (two) times a day  loratadine (CLARITIN) 10 mg tablet  Self Yes No   Sig: Take 10 mg by mouth daily   methocarbamol (ROBAXIN) 750 mg tablet  Self Yes No   Sig: Take 750 mg by mouth Three times daily as needed   naproxen (NAPROSYN) 500 mg tablet  Self No No   Sig: Take 1 tablet (500 mg total) by mouth 2 (two) times a day with meals   ondansetron (ZOFRAN) 4 mg tablet  Self Yes No   Sig: Take 4 mg by mouth   pantoprazole (PROTONIX) 40 mg tablet   No No   Sig: Take 40 mg B I D  1/2 hour before breakfast and dinner   sulfamethoxazole-trimethoprim (BACTRIM DS) 800-160 mg per tablet  Self Yes No   Sig: TAKE ONE TABLET BY MOUTH TWICE DAILY FOR 7 DAYS   traZODone (DESYREL) 50 mg tablet  Self Yes No   Sig: Take 50 mg by mouth      Facility-Administered Medications: None       Past Medical History:   Diagnosis Date    GERD (gastroesophageal reflux disease)     Psychiatric disorder        Past Surgical History:   Procedure Laterality Date    CHOLECYSTECTOMY      TUBAL LIGATION         History reviewed  No pertinent family history  I have reviewed and agree with the history as documented      E-Cigarette/Vaping    E-Cigarette Use Never User      E-Cigarette/Vaping Substances    Nicotine No     THC No     CBD No     Flavoring No     Other No     Unknown No      Social History     Tobacco Use    Smoking status: Current Every Day Smoker     Packs/day: 1 00 Types: Cigarettes    Smokeless tobacco: Never Used   Vaping Use    Vaping Use: Never used   Substance Use Topics    Alcohol use: No    Drug use: No       Review of Systems   All other systems reviewed and are negative  Physical Exam  Physical Exam  Vitals reviewed  Constitutional:       General: She is not in acute distress  Appearance: Normal appearance  She is not ill-appearing  HENT:      Mouth/Throat:      Mouth: Mucous membranes are moist    Eyes:      Conjunctiva/sclera: Conjunctivae normal    Cardiovascular:      Rate and Rhythm: Normal rate  Pulses: Normal pulses  Pulmonary:      Effort: Pulmonary effort is normal    Abdominal:      General: Abdomen is flat  Tenderness: There is no abdominal tenderness  Musculoskeletal:         General: Swelling and tenderness present  Cervical back: Neck supple  Right lower leg: No edema  Left lower leg: No edema  Comments: Patient has moderate edema and mild erythema, mild increased warmth to the knee with moderately decreased range of motion secondary to pain   Skin:     General: Skin is warm and dry  Capillary Refill: Capillary refill takes less than 2 seconds  Neurological:      General: No focal deficit present  Mental Status: She is alert  Sensory: No sensory deficit  Motor: No weakness        Comments: Able to ambulate with antalgic gait   Psychiatric:         Mood and Affect: Mood normal          Vital Signs  ED Triage Vitals [08/02/21 1128]   Temperature Pulse Respirations Blood Pressure SpO2   98 4 °F (36 9 °C) 90 16 114/65 100 %      Temp Source Heart Rate Source Patient Position - Orthostatic VS BP Location FiO2 (%)   Oral Monitor Lying Right arm --      Pain Score       9           Vitals:    08/02/21 1128 08/02/21 1607   BP: 114/65 120/63   Pulse: 90 85   Patient Position - Orthostatic VS: Lying Lying         Visual Acuity      ED Medications  Medications   acetaminophen (TYLENOL) tablet 975 mg (975 mg Oral Given 8/2/21 1214)   ketorolac (TORADOL) injection 15 mg (15 mg Intramuscular Given 8/2/21 1215)   oxyCODONE (ROXICODONE) IR tablet 5 mg (5 mg Oral Given 8/2/21 1244)       Diagnostic Studies  Results Reviewed     Procedure Component Value Units Date/Time    Body fluid culture and Gram stain [[id number]] Collected: 08/02/21 1406    Lab Status: Final result Specimen: Body Fluid from Joint, Right Knee Updated: 08/05/21 0734     Body Fluid Culture, Sterile No growth     Gram Stain Result Rare Polys      No organisms seen    Body Fluid Diff [[id number]] Collected: 08/02/21 1502    Lab Status: Final result Specimen: Body Fluid from Joint, Other Updated: 08/02/21 1625     Total Counted 100     Neutrophils % (Fluid) 78 %      Lymphs % (Fluid) 10 %      Eosinophils % (Fluid) 2 %      Monocytes % (Fluid) 10 %     Body fluid white cell count with differential [[id number]]  (Abnormal) Collected: 08/02/21 1502    Lab Status: Final result Specimen:  Body Fluid from Joint, Other Updated: 08/02/21 1544     Site Joint Fluid     Color, Fluid Red     Clarity, Fluid Cloudy     WBC, Fluid 1,363 /ul     C-reactive protein [[id number]]  (Abnormal) Collected: 08/02/21 1408    Lab Status: Final result Specimen: Blood from Arm, Right Updated: 08/02/21 1440     CRP 4 2 mg/L     Uric acid [[id number]]  (Normal) Collected: 08/02/21 1408    Lab Status: Final result Specimen: Blood from Arm, Right Updated: 08/02/21 1440     Uric Acid 2 9 mg/dL     Comprehensive metabolic panel [[id number]]  (Abnormal) Collected: 08/02/21 1408    Lab Status: Final result Specimen: Blood from Arm, Right Updated: 08/02/21 1429     Sodium 140 mmol/L      Potassium 4 3 mmol/L      Chloride 106 mmol/L      CO2 29 mmol/L      ANION GAP 5 mmol/L      BUN 18 mg/dL      Creatinine 0 84 mg/dL      Glucose 104 mg/dL      Calcium 8 7 mg/dL      Corrected Calcium 9 2 mg/dL      AST 12 U/L      ALT 14 U/L      Alkaline Phosphatase 49 U/L      Total Protein 6 4 g/dL      Albumin 3 4 g/dL      Total Bilirubin 0 23 mg/dL      eGFR 82 ml/min/1 73sq m     Narrative:      [place] guidelines for Chronic Kidney Disease (CKD):     Stage 1 with normal or high GFR (GFR > 90 mL/min/1 73 square meters)    Stage 2 Mild CKD (GFR = 60-89 mL/min/1 73 square meters)    Stage 3A Moderate CKD (GFR = 45-59 mL/min/1 73 square meters)    Stage 3B Moderate CKD (GFR = 30-44 mL/min/1 73 square meters)    Stage 4 Severe CKD (GFR = 15-29 mL/min/1 73 square meters)    Stage 5 End Stage CKD (GFR <15 mL/min/1 73 square meters)  Note: GFR calculation is accurate only with a steady state creatinine    Sedimentation rate, automated [[id number]]  (Normal) Collected: 08/02/21 1408    Lab Status: Final result Specimen: Blood from Arm, Right Updated: 08/02/21 1417     Sed Rate 2 mm/hour     CBC and differential [[id number]]  (Abnormal) Collected: 08/02/21 1408    Lab Status: Final result Specimen: Blood from Arm, Right Updated: 08/02/21 1413     WBC 8 27 Thousand/uL      RBC 3 80 Million/uL      Hemoglobin 12 9 g/dL      Hematocrit 38 8 %       fL      MCH 33 9 pg      MCHC 33 2 g/dL      RDW 11 9 %      MPV 9 2 fL      Platelets 009 Thousands/uL      nRBC 0 /100 WBCs      Neutrophils Relative 59 %      Immat GRANS % 0 %      Lymphocytes Relative 30 %      Monocytes Relative 9 %      Eosinophils Relative 1 %      Basophils Relative 1 %      Neutrophils Absolute 4 86 Thousands/µL      Immature Grans Absolute 0 01 Thousand/uL      Lymphocytes Absolute 2 48 Thousands/µL      Monocytes Absolute 0 78 Thousand/µL      Eosinophils Absolute 0 10 Thousand/µL      Basophils Absolute 0 04 Thousands/µL                  XR knee 4+ vw right injury   ED Interpretation by Doyce Kussmaul, DO (08/02 1252)   NAD      Final Result by Elise Naqvi MD (08/02 1634)      No acute osseous abnormality  Degenerative changes as described              Workstation performed: [id number] Procedures  Arthrocentesis    Date/Time: 8/2/2021 2:07 PM  Performed by: Angel Haq DO  Authorized by: Angel Haq DO     Location:  Bedside  Verbal consent obtained?: Yes    Consent given by:  Patient  Patient states understanding of procedure being performed: Yes    Patient's understanding of procedure matches consent: Yes    Procedure consent matches procedure scheduled: Yes    Relevant documents present and verified: Yes    Test results available and properly labeled: Yes    Site marked: Yes    Radiology Images displayed and confirmed  If images not available, report reviewed: Yes    Patient identity confirmed:  Verbally with patient  Indications:  Joint swelling, pain and possible septic joint  Body area:  Knee  Local anesthesia used?: Yes    Local anesthetic:  Lidocaine 2% without epinephrine  Needle size:  18 G  Approach:  Lateral  Aspirate:  Blood-tinged, bloody, clear and yellow   Lateral and medial approach utilized             ED Course  ED Course as of Aug 16 0916   [facility] Aug 02, 2021   1252 X-ray unremarkable will re-evaluate likely perform arthrocentesis  1500 After having spoken with laboratory staff should out proper containers for synovial fluid to be analyzed in nursing staff and physician were told placed in urine, however when sample was sent for analysis laboratory staff stated that cell count would be inaccurate as sample was sent down in wrong container and was already clotted therefore can only give approximate cell count with low accuracy  CRP is extremely mildly elevated ESR is normal, patient has no elevated white count and synovial fluid was initially clear kadeem however blood was aspirated on withdrawing the needle leading to a bloody tap    Have very low suspicion for septic arthritis given these findings, if cell count is within the range of normal will discharge with outpatient follow-up and Gram stain and culture will be obtained as usual 65 White blood cell count from synovial fluid 1363, do not suspect septic joint, will place in knee immobilizer and discharge with outpatient orthopedic follow-up  SBIRT 20yo+      Most Recent Value   SBIRT (22 yo +)   In order to provide better care to our patients, we are screening all of our patients for alcohol and drug use  Would it be okay to ask you these screening questions? Yes Filed at: 08/02/2021 1137   Initial Alcohol Screen: US AUDIT-C    1  How often do you have a drink containing alcohol?  0 Filed at: 08/02/2021 1137   2  How many drinks containing alcohol do you have on a typical day you are drinking? 0 Filed at: 08/02/2021 1137   3a  Male UNDER 65: How often do you have five or more drinks on one occasion? 0 Filed at: 08/02/2021 1137   3b  FEMALE Any Age, or MALE 65+: How often do you have 4 or more drinks on one occassion? 0 Filed at: 08/02/2021 1137   Audit-C Score  0 Filed at: 08/02/2021 1137   DUSTIN: How many times in the past year have you    Used an illegal drug or used a prescription medication for non-medical reasons? Never Filed at: 08/02/2021 1137                    MDM  Number of Diagnoses or Management Options  Acute pain of right knee  Diagnosis management comments: Patient is a 46-year-old female past medical history of chronic pain, GERD, bipolar disorder presenting with right knee pain  Patient is well-appearing bedside stable vitals and in no acute distress  She is able to ambulate but with antalgic gait and has moderate edema, mild erythema, mildly increased warmth and mildly decreased range of motion secondary to pain  Have low suspicion for septic joint based on the amount of time since presentation and patient is well-appearing afebrile however due to warm red joint will perform arthrocentesis to rule out infectious etiology, screening x-ray and administer pain control and reassess        Disposition  Final diagnoses:   Acute pain of right knee     Time reflects when diagnosis was documented in both MDM as applicable and the Disposition within this note     Time User Action Codes Description Comment    8/2/2021  3:53 PM Kenna Campbell Add [[id number]] Acute pain of right knee       ED Disposition     ED Disposition Condition Date/Time Comment    Discharge Stable Mon Aug 2, 2021  3:53 PM [facility] PLANO discharge to home/self care              Follow-up Information     Follow up With Specialties Details Why Contact Info Additional [address] [facility] Orthopedic Surgery Schedule an appointment as soon as possible for a visit   [address] [facility] [address] [id number]  [address] [facility], [address] [address] [place], [place], [address] [facility]          Discharge Medication List as of 8/2/2021  3:54 PM      CONTINUE these medications which have NOT CHANGED    Details   albuterol (PROVENTIL HFA,VENTOLIN HFA) 90 mcg/act inhaler Inhale 2 puffs every 6 (six) hours as needed, Starting Tue 11/24/2020, Until Wed 11/24/2021, Historical Med      amitriptyline (ELAVIL) 25 mg tablet Take 1 tablet (25 mg total) by mouth daily at bedtime, Starting Tue 3/30/2021, Normal      citalopram (CeleXA) 40 mg tablet Take 40 mg by mouth daily, Historical Med      Diclofenac Sodium (VOLTAREN) 1 % Apply 1 application topically 4 (four) times a day, Starting Wed 4/7/2021, Historical Med      dicyclomine (BENTYL) 20 mg tablet Take 20 mg by mouth every 6 (six) hours, Starting Fri 9/4/2020, Historical Med      divalproex sodium (DEPAKOTE ER) 500 mg 24 hr tablet Take 500 mg by mouth 2 (two) times a day, Starting Mon 1/29/2018, Historical Med      divalproex sodium (DEPAKOTE) 125 mg EC tablet Take 125 mg by mouth 2 (two) times a day, Starting Sat 4/10/2021, Historical Med      famotidine (PEPCID) 20 mg tablet Take 1 tablet (20 mg total) by mouth 2 (two) times a day, Starting Mon 6/7/2021, Normal      fluticasone (Flovent HFA) 44 mcg/act inhaler Inhale 2 puffs 2 (two) times a day, Starting u 7/2/2020, Until Fri 7/2/2021, Historical Med      gabapentin (NEURONTIN) 300 mg capsule Take 300 mg by mouth 2 (two) times a day, Historical Med      hydrocortisone 2 5 % cream Insert into the rectum 2 (two) times a day , Historical Med      hydrOXYzine HCL (ATARAX) 25 mg tablet Take one (1) tablet by mouth three times a day, as needed for anxiety, Historical Med      loratadine (CLARITIN) 10 mg tablet Take 10 mg by mouth daily, Starting Mon 1/29/2018, Historical Med      methocarbamol (ROBAXIN) 750 mg tablet Take 750 mg by mouth Three times daily as needed, Historical Med      Multiple Vitamins-Calcium (ONE-A-DAY WOMENS PO) Take 1 tablet by mouth daily  , Historical Med      naproxen (NAPROSYN) 500 mg tablet Take 1 tablet (500 mg total) by mouth 2 (two) times a day with meals, Starting Sun 9/6/2020, Normal      NIX CREME RINSE 1 % liquid Apply topically one time for 1 dose  Reapply in one week, Historical Med      ondansetron (ZOFRAN) 4 mg tablet Take 4 mg by mouth, Starting Mon 8/31/2020, Historical Med      pantoprazole (PROTONIX) 40 mg tablet Take 40 mg B I D  1/2 hour before breakfast and dinner, Normal      sulfamethoxazole-trimethoprim (BACTRIM DS) 800-160 mg per tablet TAKE ONE TABLET BY MOUTH TWICE DAILY FOR 7 DAYS, Historical Med      traZODone (DESYREL) 50 mg tablet Take 50 mg by mouth, Historical Med           No discharge procedures on file      PDMP Review     None          ED Provider  Electronically Signed by           Justyna Christie, DO  08/03/21 [id number] [facility], DO  08/16/21 7720

## 2021-08-02 NOTE — ED NOTES
Oxycodone dropped on floor, wasted at accudose 1 with Marcio Michaels, RN       Christen Jade RN  08/02/21 3946

## 2021-08-05 LAB
BACTERIA SPEC BFLD CULT: NO GROWTH
GRAM STN SPEC: NORMAL
GRAM STN SPEC: NORMAL

## 2021-09-30 ENCOUNTER — OFFICE VISIT (OUTPATIENT)
Dept: GASTROENTEROLOGY | Facility: CLINIC | Age: 51
End: 2021-09-30
Payer: COMMERCIAL

## 2021-09-30 VITALS
HEIGHT: 67 IN | HEART RATE: 93 BPM | BODY MASS INDEX: 25.15 KG/M2 | WEIGHT: 160.2 LBS | SYSTOLIC BLOOD PRESSURE: 110 MMHG | DIASTOLIC BLOOD PRESSURE: 62 MMHG

## 2021-09-30 DIAGNOSIS — K21.9 GASTROESOPHAGEAL REFLUX DISEASE WITHOUT ESOPHAGITIS: ICD-10-CM

## 2021-09-30 DIAGNOSIS — K58.9 IRRITABLE BOWEL SYNDROME, UNSPECIFIED TYPE: ICD-10-CM

## 2021-09-30 DIAGNOSIS — R10.13 DYSPEPSIA: Primary | ICD-10-CM

## 2021-09-30 PROCEDURE — 99213 OFFICE O/P EST LOW 20 MIN: CPT | Performed by: PHYSICIAN ASSISTANT

## 2021-09-30 RX ORDER — FAMOTIDINE 20 MG/1
40 TABLET, FILM COATED ORAL
Qty: 60 TABLET | Refills: 2 | Status: SHIPPED | OUTPATIENT
Start: 2021-09-30 | End: 2022-04-27

## 2021-09-30 NOTE — PROGRESS NOTES
[facility] - Outpatient Follow-up Note  Christopher Arrieta 54 y o  female MRN: [id number]  Encounter: [id number]          ASSESSMENT AND PLAN:      1  Gastroesophageal reflux disease without esophagitis  2  Dyspepsia  3  Irritable bowel syndrome    Patient has a long history of functional abdominal complaints, GERD, and IBS  She has underwent an extensive work up in the past: She has had 4 negative EGDs, multiple negative ultrasounds and CT Scans, a negative GES, negative colonoscopy, and is s/p lap tori  She is currently on Pantoprazole 40mg po BID and Pepcid 20mg q hs  Continue Pantoprazole 40mg po BID and can increase Pepcid to 40mg q hs x 4 weeks  GERD modifications reviewed  Low FODMAP diet recommended  Information sheet provided to patient  Recommend a 24 hr pH and esophageal manometry test to further evaluate  However, she has been unable to schedule these procedures as recommended due to social issues  Encouraged her to try to have these tests performed  Continued follow up with psych for anxiety/depression management   ______________________________________________________________________    SUBJECTIVE:  Patient is a 54year old female who presents to the office for follow up of her GERD and functional abdominal pain  She reports episodes of a knawing epigastric pain and heartburn  She is currently on Pantoprazole 40mg po BID and Pepcid 20mg q hs  She has underlying anxiety as well and reports she follows with psych  No blood in the stool  She reports her weight fluctuates up and down but no unintentional weight loss  She reports chronic bloating  She states constipation in the past but reports she is having regular bowel movements now    Per review of prior provider notes: patient has tried numerous medications in the past: she has failed pantoprazole, lansoprazole, Nexium, Pepcid, Zantac, Carafate, dicyclomine, hyoscyamine, Celexa, Welchol Zofran, Phenergan, Flexeril, Pepcid, omeprazole, Dexilant, amitriptyline, and bethanechol  She was recommended to have a 24 hr pH and esophageal manometry in the past but has not been able to arrange transportation for the testing - she was encouraged to try to arrange transportation and to let us know when she can have this testing done  REVIEW OF SYSTEMS IS OTHERWISE NEGATIVE  Historical Information   Past Medical History:   Diagnosis Date    GERD (gastroesophageal reflux disease)     Psychiatric disorder      Past Surgical History:   Procedure Laterality Date    CHOLECYSTECTOMY      TUBAL LIGATION       Social History   Social History     Substance and Sexual Activity   Alcohol Use No     Social History     Substance and Sexual Activity   Drug Use No     Social History     Tobacco Use   Smoking Status Current Every Day Smoker    Packs/day: 1 00    Types: Cigarettes   Smokeless Tobacco Never Used     No family history on file      Meds/Allergies       Current Outpatient Medications:     albuterol (PROVENTIL HFA,VENTOLIN HFA) 90 mcg/act inhaler    amitriptyline (ELAVIL) 25 mg tablet    citalopram (CeleXA) 40 mg tablet    Diclofenac Sodium (VOLTAREN) 1 %    dicyclomine (BENTYL) 20 mg tablet    divalproex sodium (DEPAKOTE ER) 500 mg 24 hr tablet    divalproex sodium (DEPAKOTE) 125 mg EC tablet    famotidine (PEPCID) 20 mg tablet    fluticasone (Flovent HFA) 44 mcg/act inhaler    gabapentin (NEURONTIN) 300 mg capsule    hydrocortisone 2 5 % cream    hydrOXYzine HCL (ATARAX) 25 mg tablet    loratadine (CLARITIN) 10 mg tablet    methocarbamol (ROBAXIN) 750 mg tablet    Multiple Vitamins-Calcium (ONE-A-DAY WOMENS PO)    naproxen (NAPROSYN) 500 mg tablet    NIX CREME RINSE 1 % liquid    ondansetron (ZOFRAN) 4 mg tablet    pantoprazole (PROTONIX) 40 mg tablet    sulfamethoxazole-trimethoprim (BACTRIM DS) 800-160 mg per tablet    traZODone (DESYREL) 50 mg tablet    Allergies   Allergen Reactions    Bee Venom     Beeswax     Penicillins      Pt unsure as to what happens            Objective     Blood pressure 110/62, pulse 93, height 5' 7" (1 702 m), weight 72 7 kg (160 lb 3 2 oz)  Body mass index is 25 09 kg/m²  PHYSICAL EXAM:      General Appearance:   Alert, cooperative, no distress   HEENT:   Normocephalic, atraumatic, anicteric      Neck:  Supple, symmetrical, trachea midline   Lungs:   Clear to auscultation bilaterally; no rales, rhonchi or wheezing; respirations unlabored    Heart[de-identified]   Regular rate and rhythm; no murmur, rub, or gallop  Abdomen:   Soft, non-tender, non-distended; normal bowel sounds; no masses, no organomegaly    Genitalia:   Deferred    Rectal:   Deferred    Extremities:  No cyanosis, clubbing or edema    Pulses:  2+ and symmetric    Skin:  No jaundice, rashes, or lesions    Lymph nodes:  No palpable cervical lymphadenopathy        Lab Results:   No visits with results within 1 Day(s) from this visit     Latest known visit with results is:   Admission on 08/02/2021, Discharged on 08/02/2021   Component Date Value    Sodium 08/02/2021 140     Potassium 08/02/2021 4 3     Chloride 08/02/2021 106     CO2 08/02/2021 29     ANION GAP 08/02/2021 5     BUN 08/02/2021 18     Creatinine 08/02/2021 0 84     Glucose 08/02/2021 104     Calcium 08/02/2021 8 7     Corrected Calcium 08/02/2021 9 2     AST 08/02/2021 12     ALT 08/02/2021 14     Alkaline Phosphatase 08/02/2021 49     Total Protein 08/02/2021 6 4     Albumin 08/02/2021 3 4*    Total Bilirubin 08/02/2021 0 23     eGFR 08/02/2021 82     WBC 08/02/2021 8 27     RBC 08/02/2021 3 80*    Hemoglobin 08/02/2021 12 9     Hematocrit 08/02/2021 38 8     MCV 08/02/2021 102*    MCH 08/02/2021 33 9     MCHC 08/02/2021 33 2     RDW 08/02/2021 11 9     MPV 08/02/2021 9 2     Platelets 42/38/4004 250     nRBC 08/02/2021 0     Neutrophils Relative 08/02/2021 59     Immat GRANS % 08/02/2021 0     Lymphocytes Relative 08/02/2021 30     Monocytes Relative 08/02/2021 9     Eosinophils Relative 08/02/2021 1     Basophils Relative 08/02/2021 1     Neutrophils Absolute 08/02/2021 4 86     Immature Grans Absolute 08/02/2021 0 01     Lymphocytes Absolute 08/02/2021 2 48     Monocytes Absolute 08/02/2021 0 78     Eosinophils Absolute 08/02/2021 0 10     Basophils Absolute 08/02/2021 0 04     Sed Rate 08/02/2021 2     CRP 08/02/2021 4 2*    Site 08/02/2021 Joint Fluid     Color, Fluid 08/02/2021 Red*    Clarity, Fluid 08/02/2021 Cloudy*    WBC, Fluid 08/02/2021 1,363*    Body Fluid Culture, Ster* 08/02/2021 No growth     Gram Stain Result 08/02/2021 Rare Polys     Gram Stain Result 08/02/2021 No organisms seen     Uric Acid 08/02/2021 2 9     Total Counted 08/02/2021 100     Neutrophils % (Fluid) 08/02/2021 78     Lymphs % (Fluid) 08/02/2021 10     Eosinophils % (Fluid) 08/02/2021 2     Monocytes % (Fluid) 08/02/2021 10          Radiology Results:   No results found

## 2021-10-15 ENCOUNTER — TELEPHONE (OUTPATIENT)
Dept: GASTROENTEROLOGY | Facility: CLINIC | Age: 51
End: 2021-10-15

## 2022-01-11 ENCOUNTER — OFFICE VISIT (OUTPATIENT)
Dept: GASTROENTEROLOGY | Facility: CLINIC | Age: 52
End: 2022-01-11
Payer: COMMERCIAL

## 2022-01-11 VITALS
HEIGHT: 67 IN | HEART RATE: 86 BPM | BODY MASS INDEX: 26.84 KG/M2 | WEIGHT: 171 LBS | SYSTOLIC BLOOD PRESSURE: 138 MMHG | DIASTOLIC BLOOD PRESSURE: 88 MMHG | OXYGEN SATURATION: 98 %

## 2022-01-11 DIAGNOSIS — K21.9 GASTROESOPHAGEAL REFLUX DISEASE, UNSPECIFIED WHETHER ESOPHAGITIS PRESENT: Primary | ICD-10-CM

## 2022-01-11 DIAGNOSIS — R68.81 EARLY SATIETY: ICD-10-CM

## 2022-01-11 PROCEDURE — 99214 OFFICE O/P EST MOD 30 MIN: CPT | Performed by: INTERNAL MEDICINE

## 2022-01-11 RX ORDER — MIRTAZAPINE 15 MG/1
TABLET, FILM COATED ORAL
COMMUNITY
Start: 2022-01-05

## 2022-01-11 RX ORDER — ALBUTEROL SULFATE 90 UG/1
2 AEROSOL, METERED RESPIRATORY (INHALATION) EVERY 6 HOURS PRN
COMMUNITY
Start: 2022-01-06

## 2022-01-11 RX ORDER — MONTELUKAST SODIUM 10 MG/1
TABLET ORAL
COMMUNITY
Start: 2021-10-20

## 2022-01-11 RX ORDER — HYDROXYZINE 50 MG/1
TABLET, FILM COATED ORAL
COMMUNITY
Start: 2022-01-05

## 2022-01-11 RX ORDER — CELECOXIB 200 MG/1
200 CAPSULE ORAL 2 TIMES DAILY PRN
COMMUNITY
Start: 2021-08-13 | End: 2022-08-13

## 2022-01-11 NOTE — PROGRESS NOTES
[facility]      Chief Complaint:  Reflux    HPI:  Cameron January is a 50 y o   female who presents with recurrent significant reflux and intermittent vomiting  Patient also is experiencing early satiety  No weight loss  No hematemesis  No melena  Patient has been on Protonix in the morning and double dose of famotidine at night  She was doing reasonably well on this until recently  She continues to smoke  24 hour pH monitoring was ordered but she did not schedule it because of social reasons  Apparently the same thing with a CT ordered by her primary care physician  EGD July 2020 was negative with negative biopsies  Ultrasound June 2021 showed post gallbladder changes with no other abnormalities  Patient has no other associated symptomatology         Review of Systems:   Constitutional: No fever or chills, feels well, no tiredness, no recent weight gain or weight loss  HENT: No complaints of earache, no hearing loss, no nosebleeds, no nasal discharge, no sore throat, no hoarseness  Eyes: No complaints of eye pain, no red eyes, no discharge from eyes, no itchy eyes  Cardiovascular: No complaints of slow heart rate, no fast heart rate, no chest pain, no palpitations, no leg claudication, no lower extremity edema  Respiratory: No complaints of shortness of breath, no wheezing, no cough, no SOB on exertion, no orthopnea  Gastrointestinal: As noted in HPI  Genitourinary: No complaints of dysuria, no incontinence, no hesitancy, no nocturia  Musculoskeletal: No complaints of arthralgia, no myalgias, no joint swelling or stiffness, no limb pain or swelling  Neurological: No complaints of headache, no confusion, no convulsions, no numbness or tingling, no dizziness or fainting, no limb weakness, no difficulty walking  Skin: No complaints of skin rash or skin lesions, no itching, no skin wound, no dry skin      Hematological/Lymphatic: No complaints of swollen glands, does not bleed easy  Allergic/Immunologic: No immunocompromised state  Endocrine:  No complaints of polyuria, no polydipsia  Psychiatric/Behavioral: is not suicidal, no sleep disturbances, no anxiety or depression, no change in personality, no emotional problems  Historical Information   Past Medical History:   Diagnosis Date    GERD (gastroesophageal reflux disease)     Psychiatric disorder      Past Surgical History:   Procedure Laterality Date    CHOLECYSTECTOMY      TUBAL LIGATION       Social History   Social History     Substance and Sexual Activity   Alcohol Use No     Social History     Substance and Sexual Activity   Drug Use No     Social History     Tobacco Use   Smoking Status Current Every Day Smoker    Packs/day: 1 00    Types: Cigarettes   Smokeless Tobacco Never Used     History reviewed  No pertinent family history  Current Medications: has a current medication list which includes the following prescription(s): albuterol, amitriptyline, celecoxib, citalopram, diclofenac sodium, dicyclomine, divalproex sodium, divalproex sodium, gabapentin, hydrocortisone, hydroxyzine hcl, loratadine, methocarbamol, mirtazapine, montelukast, multiple vitamins-minerals, naproxen, nix creme rinse, ondansetron, pantoprazole, sulfamethoxazole-trimethoprim, trazodone, famotidine, and fluticasone  Vital Signs: /88   Pulse 86   Ht 5' 7" (1 702 m)   Wt 77 6 kg (171 lb)   SpO2 98%   BMI 26 78 kg/m²       Physical Exam:   Constitutional  General Appearance: No acute distress, well appearing and well nourished  Head  Normocephalic  Eyes  Conjunctivae and lids: No swelling, erythema, or discharge  Pupils and irises: Equal, round and reactive to light  Ears, Nose, Mouth, and Throat  External inspection of ears and nose: Normal  Nasal mucosa, septum and turbinates: Normal without edema or erythema/   Oropharynx: Normal with no erythema, edema, exudate or lesions     Neck  Normal range of motion  Neck supple  Cardiovascular  Auscultation of the heart: Normal rate and rhythm, normal S1 and S2 without murmurs  Examination of the extremities for edema and/or varicosities: Normal  Pulmonary/Chest  Respiratory effort: No increased work of breathing or signs of respiratory distress  Auscultation of lungs: Clear to auscultation, equal breath sounds bilaterally, no wheezes, rales, no rhonchi  Abdomen  Abdomen: Non-tender, no masses  No succussion splash  Liver and spleen: No hepatomegaly or splenomegaly  Musculoskeletal  Gait and station: normal   Digits and Nails: normal without clubbing or cyanosis  Inspection/palpation of joints, bones, and muscles: Normal  Neurological  No nystagmus or asterixis  Skin  Skin and subcutaneous tissue: Normal without rashes or lesions  Lymphatic  Palpation of the lymph nodes in neck: No lymphadenopathy  Psychiatric  Orientation to person, place and time: Normal   Mood and affect: Normal          Labs:  Lab Results   Component Value Date    ALT 14 08/02/2021    AST 12 08/02/2021    BUN 18 08/02/2021    CALCIUM 8 7 08/02/2021     08/02/2021    CO2 29 08/02/2021    CREATININE 0 84 08/02/2021    HCT 38 8 08/02/2021    HGB 12 9 08/02/2021     08/02/2021    K 4 3 08/02/2021    WBC 8 27 08/02/2021         X-Rays & Procedures:   NM gastric emptying    (Results Pending)           ______________________________________________________________________      Assessment & Plan:     Diagnoses and all orders for this visit:    Gastroesophageal reflux disease, unspecified whether esophagitis present  -     NM gastric emptying; Future    Early satiety  -     NM gastric emptying; Future      Patient is advised a wedge at this point  She is also strongly advised to quit smoking  We will encourage her to try to schedule the pH testing and CT scan which she knows she needs    We will also send her for a gastric emptying study because of the early satiety and ongoing GERD   She will continue her current medical management

## 2022-02-03 ENCOUNTER — TELEPHONE (OUTPATIENT)
Dept: GASTROENTEROLOGY | Facility: CLINIC | Age: 52
End: 2022-02-03

## 2022-02-03 NOTE — TELEPHONE ENCOUNTER
JOVNAY: Spoke with patient at length  History of GERD, early satiety  Patient states she is under a lot of stress and her reflux is worse  Also patient has has 2 episodes of diarrhea  Patient would like to know if she can utilize pepto bismol  Patient will start this today PRN  She will call central scheduling to try and have GES done sooner

## 2022-02-04 NOTE — TELEPHONE ENCOUNTER
PT HAD COLON 2015 WITH DR Noe Meza AND WAS NORMAL  YOU NEVER SCOPED HER  NOT SURE WHY YOU PUT A RECALL IN FOLDER

## 2022-03-30 ENCOUNTER — TELEPHONE (OUTPATIENT)
Dept: GASTROENTEROLOGY | Facility: CLINIC | Age: 52
End: 2022-03-30

## 2022-03-30 NOTE — TELEPHONE ENCOUNTER
JOVANY: Spoke with patient  History of GERD, IBS    Patient c/o constipation  She has utilized fleet suppository  She passing a BM today  Advised okay to continue fleet suppositories PRN for constipation

## 2022-03-30 NOTE — TELEPHONE ENCOUNTER
Pt called has been very constipated and has been using suppositorys but are starting to hurt also has some upper pain as well would like to talk with someone

## 2022-04-08 DIAGNOSIS — R10.9 FUNCTIONAL ABDOMINAL PAIN SYNDROME: Primary | ICD-10-CM

## 2022-04-08 DIAGNOSIS — K21.9 GASTROESOPHAGEAL REFLUX DISEASE WITHOUT ESOPHAGITIS: ICD-10-CM

## 2022-04-08 DIAGNOSIS — R10.13 EPIGASTRIC PAIN: ICD-10-CM

## 2022-04-08 RX ORDER — AMITRIPTYLINE HYDROCHLORIDE 25 MG/1
25 TABLET, FILM COATED ORAL
Qty: 90 TABLET | Refills: 3 | Status: SHIPPED | OUTPATIENT
Start: 2022-04-08

## 2022-04-27 DIAGNOSIS — K21.9 GASTROESOPHAGEAL REFLUX DISEASE WITHOUT ESOPHAGITIS: ICD-10-CM

## 2022-04-27 RX ORDER — FAMOTIDINE 20 MG/1
TABLET, FILM COATED ORAL
Qty: 60 TABLET | Refills: 2 | Status: SHIPPED | OUTPATIENT
Start: 2022-04-27

## 2022-05-06 ENCOUNTER — TELEPHONE (OUTPATIENT)
Dept: GASTROENTEROLOGY | Facility: CLINIC | Age: 52
End: 2022-05-06

## 2022-05-18 ENCOUNTER — TELEPHONE (OUTPATIENT)
Dept: GASTROENTEROLOGY | Facility: CLINIC | Age: 52
End: 2022-05-18

## 2022-05-23 NOTE — TELEPHONE ENCOUNTER
Spoke with patient  Her symptoms resolved  She had not scheduled CT scan or GES    Patient will call to schedule these

## 2022-09-13 DIAGNOSIS — K21.9 GASTROESOPHAGEAL REFLUX DISEASE WITHOUT ESOPHAGITIS: ICD-10-CM

## 2022-09-13 RX ORDER — FAMOTIDINE 20 MG/1
TABLET, FILM COATED ORAL
Qty: 60 TABLET | Refills: 2 | Status: SHIPPED | OUTPATIENT
Start: 2022-09-13 | End: 2022-10-24

## 2022-10-24 DIAGNOSIS — K21.9 GASTROESOPHAGEAL REFLUX DISEASE WITHOUT ESOPHAGITIS: ICD-10-CM

## 2022-10-24 RX ORDER — FAMOTIDINE 20 MG/1
TABLET, FILM COATED ORAL
Qty: 60 TABLET | Refills: 2 | Status: SHIPPED | OUTPATIENT
Start: 2022-10-24

## 2023-02-06 ENCOUNTER — OFFICE VISIT (OUTPATIENT)
Dept: GASTROENTEROLOGY | Facility: CLINIC | Age: 53
End: 2023-02-06

## 2023-02-06 VITALS
WEIGHT: 181 LBS | BODY MASS INDEX: 28.41 KG/M2 | OXYGEN SATURATION: 98 % | DIASTOLIC BLOOD PRESSURE: 80 MMHG | SYSTOLIC BLOOD PRESSURE: 122 MMHG | HEIGHT: 67 IN | HEART RATE: 105 BPM

## 2023-02-06 DIAGNOSIS — K59.00 CONSTIPATION, UNSPECIFIED CONSTIPATION TYPE: Primary | ICD-10-CM

## 2023-02-06 DIAGNOSIS — K62.5 RECTAL BLEEDING: ICD-10-CM

## 2023-02-06 DIAGNOSIS — Z83.71 FAMILY HISTORY OF COLONIC POLYPS: ICD-10-CM

## 2023-02-06 DIAGNOSIS — K64.9 HEMORRHOIDS, UNSPECIFIED HEMORRHOID TYPE: ICD-10-CM

## 2023-02-06 RX ORDER — HYDROCORTISONE 10 MG/G
1 CREAM TOPICAL 2 TIMES DAILY
Qty: 28 G | Refills: 1 | Status: SHIPPED | OUTPATIENT
Start: 2023-02-06

## 2023-02-06 RX ORDER — CYCLOBENZAPRINE HCL 5 MG
TABLET ORAL
COMMUNITY
Start: 2023-01-10 | End: 2023-02-06

## 2023-02-06 RX ORDER — LIDOCAINE 50 MG/G
PATCH TOPICAL
COMMUNITY
Start: 2022-12-01

## 2023-02-06 RX ORDER — BUDESONIDE 9 MG/1
TABLET, FILM COATED, EXTENDED RELEASE ORAL
COMMUNITY
Start: 2023-01-06

## 2023-02-06 RX ORDER — FLUTICASONE PROPIONATE AND SALMETEROL 100; 50 UG/1; UG/1
POWDER RESPIRATORY (INHALATION)
COMMUNITY
Start: 2023-01-05

## 2023-02-06 RX ORDER — OMEPRAZOLE 40 MG/1
CAPSULE, DELAYED RELEASE ORAL
COMMUNITY
Start: 2023-02-02

## 2023-02-06 RX ORDER — IBUPROFEN 600 MG/1
TABLET ORAL
COMMUNITY
Start: 2023-02-02

## 2023-02-06 NOTE — PATIENT INSTRUCTIONS
Scheduled date of colonoscopy (as of today): 4/18/23  Physician performing colonoscopy: Esther  Location of colonoscopy: [facility]  Bowel prep reviewed with patient: Zackary/Ehsan  Instructions reviewed with patient by: Chuy SHEPARD  Clearances:

## 2023-02-06 NOTE — PROGRESS NOTES
[facility] - Outpatient Follow-up Note  Jr Nugent 48 y o  female MRN: [id number]  Encounter: [id number]          ASSESSMENT AND PLAN:      1  Constipation  2  Rectal bleeding  3  Family history of colonic polyps    Patient reports a change in her bowel movements with constipation over the past month  She reports scant rectal bleeding and hemorrhoids  Her last colonoscopy was in 2015  She has a mother with a history of colon polyps  Will plan for colonoscopy to investigate  Recommend she begin a fiber supplement like Benefiber and Miralax 1 capful daily to regulate her bowel movements  Will give a hydrocortisone cream BID course for her hemorrhoids  ______________________________________________________________________    SUBJECTIVE:  Patient is a pleasant 48year old female who presents to the office for an evaluation  She reports for the past month she has been struggling with constipation and hemorrhoids  She reports scant rectal bleeding  Some discomfort at times  Her last colonoscopy was in 2015  No family history of colon cancer  She reports her mother had colon polyps  She has tried stool softeners with insufficient relief  REVIEW OF SYSTEMS IS OTHERWISE NEGATIVE  Historical Information   Past Medical History:   Diagnosis Date   • GERD (gastroesophageal reflux disease)    • Psychiatric disorder      Past Surgical History:   Procedure Laterality Date   • CHOLECYSTECTOMY     • TUBAL LIGATION       Social History   Social History     Substance and Sexual Activity   Alcohol Use No     Social History     Substance and Sexual Activity   Drug Use No     Social History     Tobacco Use   Smoking Status Every Day   • Packs/day: 1 00   • Types: Cigarettes   Smokeless Tobacco Never     History reviewed  No pertinent family history      Meds/Allergies       Current Outpatient Medications:   •  albuterol (PROVENTIL HFA,VENTOLIN HFA) 90 mcg/act inhaler  • amitriptyline (ELAVIL) 25 mg tablet  •  budesonide 9 mg TB24  •  citalopram (CeleXA) 40 mg tablet  •  Diclofenac Sodium (VOLTAREN) 1 %  •  dicyclomine (BENTYL) 20 mg tablet  •  divalproex sodium (DEPAKOTE ER) 500 mg 24 hr tablet  •  divalproex sodium (DEPAKOTE) 125 mg EC tablet  •  famotidine (PEPCID) 20 mg tablet  •  Fluticasone-Salmeterol (Advair) 100-50 mcg/dose inhaler  •  gabapentin (NEURONTIN) 300 mg capsule  •  hydrocortisone 2 5 % cream  •  Hydrocortisone, Perianal, (Proctocort) 1 % CREA  •  hydrOXYzine HCL (ATARAX) 50 mg tablet  •  ibuprofen (MOTRIN) 600 mg tablet  •  lidocaine (LIDODERM) 5 %  •  loratadine (CLARITIN) 10 mg tablet  •  methocarbamol (ROBAXIN) 750 mg tablet  •  mirtazapine (REMERON) 15 mg tablet  •  montelukast (SINGULAIR) 10 mg tablet  •  Multiple Vitamins-Calcium (ONE-A-DAY WOMENS PO)  •  naproxen (NAPROSYN) 500 mg tablet  •  NIX CREME RINSE 1 % liquid  •  omeprazole (PriLOSEC) 40 MG capsule  •  ondansetron (ZOFRAN) 4 mg tablet  •  sulfamethoxazole-trimethoprim (BACTRIM DS) 800-160 mg per tablet  •  traZODone (DESYREL) 50 mg tablet  •  celecoxib (CeleBREX) 200 mg capsule  •  fluticasone (Flovent HFA) 44 mcg/act inhaler    Allergies   Allergen Reactions   • Bee Venom    • Beeswax    • Penicillins      Pt unsure as to what happens            Objective     Blood pressure 122/80, pulse 105, height 5' 7" (1 702 m), weight 82 1 kg (181 lb), SpO2 98 %  Body mass index is 28 35 kg/m²  PHYSICAL EXAM:      General Appearance:   Alert, cooperative, no distress   HEENT:   Normocephalic, atraumatic, anicteric      Neck:  Supple, symmetrical, trachea midline   Lungs:   Clear to auscultation bilaterally; no rales, rhonchi or wheezing; respirations unlabored    Heart[de-identified]   Regular rate and rhythm; no murmur, rub, or gallop     Abdomen:   Soft, non-tender, non-distended; normal bowel sounds; no masses, no organomegaly    Genitalia:   Deferred    Rectal:   Deferred    Extremities:  No cyanosis, clubbing or edema    Pulses:  2+ and symmetric    Skin:  No jaundice, rashes, or lesions    Lymph nodes:  No palpable cervical lymphadenopathy        Lab Results:   No visits with results within 1 Day(s) from this visit     Latest known visit with results is:   Admission on 08/02/2021, Discharged on 08/02/2021   Component Date Value   • Sodium 08/02/2021 140    • Potassium 08/02/2021 4 3    • Chloride 08/02/2021 106    • CO2 08/02/2021 29    • ANION GAP 08/02/2021 5    • BUN 08/02/2021 18    • Creatinine 08/02/2021 0 84    • Glucose 08/02/2021 104    • Calcium 08/02/2021 8 7    • Corrected Calcium 08/02/2021 9 2    • AST 08/02/2021 12    • ALT 08/02/2021 14    • Alkaline Phosphatase 08/02/2021 49    • Total Protein 08/02/2021 6 4    • Albumin 08/02/2021 3 4 (L)    • Total Bilirubin 08/02/2021 0 23    • eGFR 08/02/2021 82    • WBC 08/02/2021 8 27    • RBC 08/02/2021 3 80 (L)    • Hemoglobin 08/02/2021 12 9    • Hematocrit 08/02/2021 38 8    • MCV 08/02/2021 102 (H)    • MCH 08/02/2021 33 9    • MCHC 08/02/2021 33 2    • RDW 08/02/2021 11 9    • MPV 08/02/2021 9 2    • Platelets 07/11/5312 250    • nRBC 08/02/2021 0    • Neutrophils Relative 08/02/2021 59    • Immat GRANS % 08/02/2021 0    • Lymphocytes Relative 08/02/2021 30    • Monocytes Relative 08/02/2021 9    • Eosinophils Relative 08/02/2021 1    • Basophils Relative 08/02/2021 1    • Neutrophils Absolute 08/02/2021 4 86    • Immature Grans Absolute 08/02/2021 0 01    • Lymphocytes Absolute 08/02/2021 2 48    • Monocytes Absolute 08/02/2021 0 78    • Eosinophils Absolute 08/02/2021 0 10    • Basophils Absolute 08/02/2021 0 04    • Sed Rate 08/02/2021 2    • CRP 08/02/2021 4 2 (H)    • Site 08/02/2021 Joint Fluid    • Color, Fluid 08/02/2021 Red (A)    • Clarity, Fluid 08/02/2021 Cloudy (A)    • WBC, Fluid 08/02/2021 1,363 (H)    • Body Fluid Culture, Ster* 08/02/2021 No growth    • Gram Stain Result 08/02/2021 Rare Polys    • Gram Stain Result 08/02/2021 No organisms seen • Uric Acid 08/02/2021 2 9    • Total Counted 08/02/2021 100    • Neutrophils % (Fluid) 08/02/2021 78    • Lymphs % (Fluid) 08/02/2021 10    • Eosinophils % (Fluid) 08/02/2021 2    • Monocytes % (Fluid) 08/02/2021 10          Radiology Results:   No results found

## 2023-03-23 ENCOUNTER — OFFICE VISIT (OUTPATIENT)
Dept: GASTROENTEROLOGY | Facility: CLINIC | Age: 53
End: 2023-03-23

## 2023-03-23 VITALS
OXYGEN SATURATION: 97 % | WEIGHT: 178 LBS | HEIGHT: 67 IN | DIASTOLIC BLOOD PRESSURE: 74 MMHG | HEART RATE: 63 BPM | BODY MASS INDEX: 27.94 KG/M2 | RESPIRATION RATE: 18 BRPM | SYSTOLIC BLOOD PRESSURE: 120 MMHG

## 2023-03-23 DIAGNOSIS — Z83.71 FAMILY HISTORY OF COLONIC POLYPS: ICD-10-CM

## 2023-03-23 DIAGNOSIS — K62.5 RECTAL BLEED: ICD-10-CM

## 2023-03-23 DIAGNOSIS — K59.00 CONSTIPATION, UNSPECIFIED CONSTIPATION TYPE: Primary | ICD-10-CM

## 2023-03-23 RX ORDER — CYCLOBENZAPRINE HCL 5 MG
TABLET ORAL
COMMUNITY
Start: 2023-03-10

## 2023-03-23 RX ORDER — POLYETHYLENE GLYCOL 3350 17 G/17G
17 POWDER, FOR SOLUTION ORAL DAILY
Qty: 510 G | Refills: 1 | Status: SHIPPED | OUTPATIENT
Start: 2023-03-23 | End: 2023-04-22

## 2023-03-23 RX ORDER — METHYLPREDNISOLONE 4 MG/1
TABLET ORAL
COMMUNITY
Start: 2023-02-20

## 2023-03-23 RX ORDER — HYDROCORTISONE 10 MG/G
1 CREAM TOPICAL 2 TIMES DAILY
Qty: 28 G | Refills: 1 | Status: SHIPPED | OUTPATIENT
Start: 2023-03-23 | End: 2023-04-06

## 2023-03-23 NOTE — PROGRESS NOTES
[facility] - Outpatient Follow-up Note  Laramie January 48 y o  female MRN: [id number]  Encounter: [id number]          ASSESSMENT AND PLAN:      1  Rectal bleed  2  Constipation  3  Family history of colonic polyps    Patient reports of struggling with hard stools/constipation, recurrent scant rectal bleeding and hemorrhoids  She had one recent episode in the toilet bowel, otherwise she notices it on the toilet tissue  Her last colonoscopy was in 2015  She has a mother with a history of colon polyps      Will plan for colonoscopy to investigate  She is already scheduled for this procedure  Recommend she begin a fiber supplement like Benefiber and Miralax 1 capful daily to regulate her bowel movements  Will give a hydrocortisone cream BID course for her hemorrhoids  ______________________________________________________________________    SUBJECTIVE:  Patient presents to the office for follow up  She continues to have rectal bleeding on the toilet tissue  One time there was bleeding in the toilet bowel  She reports hemorrhoids  She also has hard stools and straining  She tried stool softeners without benefit  She did not try the Benefiber or Miralax yet  She is scheduled for a colonoscopy in April  Her mother had colon polyps  Her last colonoscopy was in 2015  REVIEW OF SYSTEMS IS OTHERWISE NEGATIVE  Historical Information   Past Medical History:   Diagnosis Date   • GERD (gastroesophageal reflux disease)    • Psychiatric disorder      Past Surgical History:   Procedure Laterality Date   • CHOLECYSTECTOMY     • TUBAL LIGATION       Social History   Social History     Substance and Sexual Activity   Alcohol Use No     Social History     Substance and Sexual Activity   Drug Use No     Social History     Tobacco Use   Smoking Status Every Day   • Packs/day: 1 00   • Types: Cigarettes   Smokeless Tobacco Never     No family history on file      Meds/Allergies Current Outpatient Medications:   •  albuterol (PROVENTIL HFA,VENTOLIN HFA) 90 mcg/act inhaler  •  amitriptyline (ELAVIL) 25 mg tablet  •  budesonide 9 mg TB24  •  citalopram (CeleXA) 40 mg tablet  •  cyclobenzaprine (FLEXERIL) 5 mg tablet  •  Diclofenac Sodium (VOLTAREN) 1 %  •  dicyclomine (BENTYL) 20 mg tablet  •  divalproex sodium (DEPAKOTE ER) 500 mg 24 hr tablet  •  divalproex sodium (DEPAKOTE) 125 mg EC tablet  •  famotidine (PEPCID) 20 mg tablet  •  Fluticasone-Salmeterol (Advair) 100-50 mcg/dose inhaler  •  gabapentin (NEURONTIN) 300 mg capsule  •  hydrocortisone 2 5 % cream  •  Hydrocortisone, Perianal, (Proctocort) 1 % CREA  •  hydrOXYzine HCL (ATARAX) 50 mg tablet  •  ibuprofen (MOTRIN) 600 mg tablet  •  lidocaine (LIDODERM) 5 %  •  loratadine (CLARITIN) 10 mg tablet  •  methocarbamol (ROBAXIN) 750 mg tablet  •  methylPREDNISolone 4 MG tablet therapy pack  •  mirtazapine (REMERON) 15 mg tablet  •  montelukast (SINGULAIR) 10 mg tablet  •  Multiple Vitamins-Calcium (ONE-A-DAY WOMENS PO)  •  naproxen (NAPROSYN) 500 mg tablet  •  NIX CREME RINSE 1 % liquid  •  omeprazole (PriLOSEC) 40 MG capsule  •  ondansetron (ZOFRAN) 4 mg tablet  •  polyethylene glycol (GLYCOLAX) 17 GM/SCOOP powder  •  sulfamethoxazole-trimethoprim (BACTRIM DS) 800-160 mg per tablet  •  traZODone (DESYREL) 50 mg tablet  •  celecoxib (CeleBREX) 200 mg capsule  •  fluticasone (Flovent HFA) 44 mcg/act inhaler    Allergies   Allergen Reactions   • Bee Venom    • Beeswax    • Penicillins      Pt unsure as to what happens            Objective     Blood pressure 120/74, pulse 63, resp  rate 18, height 5' 7" (1 702 m), weight 80 7 kg (178 lb), SpO2 97 %  Body mass index is 27 88 kg/m²        PHYSICAL EXAM:      General Appearance:   Alert, cooperative, no distress   HEENT:   Normocephalic, atraumatic, anicteric      Neck:  Supple, symmetrical, trachea midline   Lungs:   Clear to auscultation bilaterally; no rales, rhonchi or wheezing; respirations unlabored    Heart[de-identified]   Regular rate and rhythm; no murmur, rub, or gallop  Abdomen:   Soft, non-tender, non-distended; normal bowel sounds; no masses, no organomegaly    Genitalia:   Deferred    Rectal:   Deferred    Extremities:  No cyanosis, clubbing or edema    Pulses:  2+ and symmetric    Skin:  No jaundice, rashes, or lesions    Lymph nodes:  No palpable cervical lymphadenopathy        Lab Results:   No visits with results within 1 Day(s) from this visit     Latest known visit with results is:   Admission on 08/02/2021, Discharged on 08/02/2021   Component Date Value   • Sodium 08/02/2021 140    • Potassium 08/02/2021 4 3    • Chloride 08/02/2021 106    • CO2 08/02/2021 29    • ANION GAP 08/02/2021 5    • BUN 08/02/2021 18    • Creatinine 08/02/2021 0 84    • Glucose 08/02/2021 104    • Calcium 08/02/2021 8 7    • Corrected Calcium 08/02/2021 9 2    • AST 08/02/2021 12    • ALT 08/02/2021 14    • Alkaline Phosphatase 08/02/2021 49    • Total Protein 08/02/2021 6 4    • Albumin 08/02/2021 3 4 (L)    • Total Bilirubin 08/02/2021 0 23    • eGFR 08/02/2021 82    • WBC 08/02/2021 8 27    • RBC 08/02/2021 3 80 (L)    • Hemoglobin 08/02/2021 12 9    • Hematocrit 08/02/2021 38 8    • MCV 08/02/2021 102 (H)    • MCH 08/02/2021 33 9    • MCHC 08/02/2021 33 2    • RDW 08/02/2021 11 9    • MPV 08/02/2021 9 2    • Platelets 28/07/9152 250    • nRBC 08/02/2021 0    • Neutrophils Relative 08/02/2021 59    • Immat GRANS % 08/02/2021 0    • Lymphocytes Relative 08/02/2021 30    • Monocytes Relative 08/02/2021 9    • Eosinophils Relative 08/02/2021 1    • Basophils Relative 08/02/2021 1    • Neutrophils Absolute 08/02/2021 4 86    • Immature Grans Absolute 08/02/2021 0 01    • Lymphocytes Absolute 08/02/2021 2 48    • Monocytes Absolute 08/02/2021 0 78    • Eosinophils Absolute 08/02/2021 0 10    • Basophils Absolute 08/02/2021 0 04    • Sed Rate 08/02/2021 2    • CRP 08/02/2021 4 2 (H)    • Site 08/02/2021 Joint Fluid    • Color, Fluid 08/02/2021 Red (A)    • Clarity, Fluid 08/02/2021 Cloudy (A)    • WBC, Fluid 08/02/2021 1,363 (H)    • Body Fluid Culture, Ster* 08/02/2021 No growth    • Gram Stain Result 08/02/2021 Rare Polys    • Gram Stain Result 08/02/2021 No organisms seen    • Uric Acid 08/02/2021 2 9    • Total Counted 08/02/2021 100    • Neutrophils % (Fluid) 08/02/2021 78    • Lymphs % (Fluid) 08/02/2021 10    • Eosinophils % (Fluid) 08/02/2021 2    • Monocytes % (Fluid) 08/02/2021 10          Radiology Results:   No results found

## 2023-04-05 ENCOUNTER — TELEPHONE (OUTPATIENT)
Dept: GASTROENTEROLOGY | Facility: CLINIC | Age: 53
End: 2023-04-05

## 2023-06-28 ENCOUNTER — PREP FOR PROCEDURE (OUTPATIENT)
Dept: GASTROENTEROLOGY | Facility: CLINIC | Age: 53
End: 2023-06-28

## 2023-06-28 DIAGNOSIS — K59.00 CONSTIPATION, UNSPECIFIED CONSTIPATION TYPE: ICD-10-CM

## 2023-06-28 DIAGNOSIS — Z83.71 FAMILY HISTORY OF COLONIC POLYPS: ICD-10-CM

## 2023-06-28 DIAGNOSIS — K62.5 RECTAL BLEED: Primary | ICD-10-CM

## 2023-08-09 ENCOUNTER — TELEPHONE (OUTPATIENT)
Age: 53
End: 2023-08-09

## 2023-08-09 NOTE — TELEPHONE ENCOUNTER
Pt called says she needs to see a GI doctor -- experiencing increased discomfort from both internal and external hemorrhoids - advuised has a Colonoscopy 9/7/23 already scheduled -- I lost PT after that/ call dropped -- unable to get her back

## 2023-12-04 ENCOUNTER — TELEPHONE (OUTPATIENT)
Age: 53
End: 2023-12-04

## 2023-12-04 NOTE — TELEPHONE ENCOUNTER
mayra Robins confirming colonoscopy for 12/05/23. Reminded patient that she is on all liquid diet, nothing red, purple or orange to drink. No solids, no dairy. Any questions, please call office.

## 2024-07-20 ENCOUNTER — HOSPITAL ENCOUNTER (INPATIENT)
Facility: HOSPITAL | Age: 54
LOS: 1 days | Discharge: HOME | DRG: 192 | End: 2024-07-23
Attending: EMERGENCY MEDICINE | Admitting: STUDENT IN AN ORGANIZED HEALTH CARE EDUCATION/TRAINING PROGRAM
Payer: COMMERCIAL

## 2024-07-20 ENCOUNTER — APPOINTMENT (EMERGENCY)
Dept: RADIOLOGY | Facility: HOSPITAL | Age: 54
DRG: 192 | End: 2024-07-20
Payer: COMMERCIAL

## 2024-07-20 DIAGNOSIS — R07.9 CHEST PAIN, UNSPECIFIED TYPE: Primary | ICD-10-CM

## 2024-07-20 DIAGNOSIS — R53.83 OTHER FATIGUE: ICD-10-CM

## 2024-07-20 DIAGNOSIS — R79.89 ELEVATED TROPONIN: ICD-10-CM

## 2024-07-20 DIAGNOSIS — Z72.0 TOBACCO ABUSE: ICD-10-CM

## 2024-07-20 PROBLEM — F99 PSYCHIATRIC DIAGNOSIS: Status: ACTIVE | Noted: 2024-07-20

## 2024-07-20 LAB
2HR DELTA HS TROPONIN: 20 NG/L
2HR DELTA HS TROPONIN: 50 NG/L
4HR DELTA HS TROPONIN: -6 NG/L
4HR DELTA HS TROPONIN: 73 NG/L
ALBUMIN SERPL BCG-MCNC: 4 G/DL (ref 3.5–5)
ALP SERPL-CCNC: 69 U/L (ref 34–104)
ALT SERPL W P-5'-P-CCNC: 9 U/L (ref 7–52)
ANION GAP SERPL CALCULATED.3IONS-SCNC: 6 MMOL/L (ref 4–13)
AST SERPL W P-5'-P-CCNC: 12 U/L (ref 13–39)
ATRIAL RATE: 106 BPM
ATRIAL RATE: 89 BPM
ATRIAL RATE: 92 BPM
BASOPHILS # BLD AUTO: 0.04 THOUSANDS/ÂΜL (ref 0–0.1)
BASOPHILS NFR BLD AUTO: 1 % (ref 0–1)
BILIRUB SERPL-MCNC: 0.25 MG/DL (ref 0.2–1)
BUN SERPL-MCNC: 23 MG/DL (ref 5–25)
CALCIUM SERPL-MCNC: 8.9 MG/DL (ref 8.4–10.2)
CARDIAC TROPONIN I PNL SERPL HS: 10 NG/L
CARDIAC TROPONIN I PNL SERPL HS: 150 NG/L
CARDIAC TROPONIN I PNL SERPL HS: 156 NG/L
CARDIAC TROPONIN I PNL SERPL HS: 206 NG/L
CARDIAC TROPONIN I PNL SERPL HS: 229 NG/L
CARDIAC TROPONIN I PNL SERPL HS: 30 NG/L
CHLORIDE SERPL-SCNC: 107 MMOL/L (ref 96–108)
CO2 SERPL-SCNC: 29 MMOL/L (ref 21–32)
CREAT SERPL-MCNC: 0.81 MG/DL (ref 0.6–1.3)
D DIMER PPP FEU-MCNC: 0.28 UG/ML FEU
EOSINOPHIL # BLD AUTO: 0.04 THOUSAND/ÂΜL (ref 0–0.61)
EOSINOPHIL NFR BLD AUTO: 1 % (ref 0–6)
ERYTHROCYTE [DISTWIDTH] IN BLOOD BY AUTOMATED COUNT: 12.4 % (ref 11.6–15.1)
GFR SERPL CREATININE-BSD FRML MDRD: 83 ML/MIN/1.73SQ M
GLUCOSE SERPL-MCNC: 100 MG/DL (ref 65–140)
HCT VFR BLD AUTO: 38.9 % (ref 34.8–46.1)
HGB BLD-MCNC: 13.3 G/DL (ref 11.5–15.4)
IMM GRANULOCYTES # BLD AUTO: 0.01 THOUSAND/UL (ref 0–0.2)
IMM GRANULOCYTES NFR BLD AUTO: 0 % (ref 0–2)
LIPASE SERPL-CCNC: 10 U/L (ref 11–82)
LYMPHOCYTES # BLD AUTO: 1.77 THOUSANDS/ÂΜL (ref 0.6–4.47)
LYMPHOCYTES NFR BLD AUTO: 22 % (ref 14–44)
MCH RBC QN AUTO: 33.6 PG (ref 26.8–34.3)
MCHC RBC AUTO-ENTMCNC: 34.2 G/DL (ref 31.4–37.4)
MCV RBC AUTO: 98 FL (ref 82–98)
MONOCYTES # BLD AUTO: 0.87 THOUSAND/ÂΜL (ref 0.17–1.22)
MONOCYTES NFR BLD AUTO: 11 % (ref 4–12)
NEUTROPHILS # BLD AUTO: 5.34 THOUSANDS/ÂΜL (ref 1.85–7.62)
NEUTS SEG NFR BLD AUTO: 65 % (ref 43–75)
NRBC BLD AUTO-RTO: 0 /100 WBCS
P AXIS: 75 DEGREES
P AXIS: 77 DEGREES
P AXIS: 77 DEGREES
PLATELET # BLD AUTO: 319 THOUSANDS/UL (ref 149–390)
PLATELET # BLD AUTO: 321 THOUSANDS/UL (ref 149–390)
PMV BLD AUTO: 8.8 FL (ref 8.9–12.7)
PMV BLD AUTO: 8.8 FL (ref 8.9–12.7)
POTASSIUM SERPL-SCNC: 3.8 MMOL/L (ref 3.5–5.3)
PR INTERVAL: 190 MS
PR INTERVAL: 196 MS
PR INTERVAL: 202 MS
PROT SERPL-MCNC: 6.5 G/DL (ref 6.4–8.4)
QRS AXIS: 73 DEGREES
QRS AXIS: 75 DEGREES
QRS AXIS: 77 DEGREES
QRSD INTERVAL: 134 MS
QRSD INTERVAL: 138 MS
QRSD INTERVAL: 140 MS
QT INTERVAL: 356 MS
QT INTERVAL: 390 MS
QT INTERVAL: 406 MS
QTC INTERVAL: 472 MS
QTC INTERVAL: 474 MS
QTC INTERVAL: 502 MS
RBC # BLD AUTO: 3.96 MILLION/UL (ref 3.81–5.12)
SODIUM SERPL-SCNC: 142 MMOL/L (ref 135–147)
T WAVE AXIS: 57 DEGREES
T WAVE AXIS: 59 DEGREES
T WAVE AXIS: 62 DEGREES
VENTRICULAR RATE: 106 BPM
VENTRICULAR RATE: 89 BPM
VENTRICULAR RATE: 92 BPM
WBC # BLD AUTO: 8.07 THOUSAND/UL (ref 4.31–10.16)

## 2024-07-20 PROCEDURE — 36415 COLL VENOUS BLD VENIPUNCTURE: CPT | Performed by: EMERGENCY MEDICINE

## 2024-07-20 PROCEDURE — 99285 EMERGENCY DEPT VISIT HI MDM: CPT | Performed by: EMERGENCY MEDICINE

## 2024-07-20 PROCEDURE — 83690 ASSAY OF LIPASE: CPT | Performed by: EMERGENCY MEDICINE

## 2024-07-20 PROCEDURE — 99222 1ST HOSP IP/OBS MODERATE 55: CPT | Performed by: FAMILY MEDICINE

## 2024-07-20 PROCEDURE — 85025 COMPLETE CBC W/AUTO DIFF WBC: CPT | Performed by: EMERGENCY MEDICINE

## 2024-07-20 PROCEDURE — 93005 ELECTROCARDIOGRAM TRACING: CPT

## 2024-07-20 PROCEDURE — 84484 ASSAY OF TROPONIN QUANT: CPT | Performed by: PHYSICIAN ASSISTANT

## 2024-07-20 PROCEDURE — 85379 FIBRIN DEGRADATION QUANT: CPT | Performed by: EMERGENCY MEDICINE

## 2024-07-20 PROCEDURE — 99284 EMERGENCY DEPT VISIT MOD MDM: CPT

## 2024-07-20 PROCEDURE — 93010 ELECTROCARDIOGRAM REPORT: CPT | Performed by: INTERNAL MEDICINE

## 2024-07-20 PROCEDURE — 71046 X-RAY EXAM CHEST 2 VIEWS: CPT

## 2024-07-20 PROCEDURE — 80053 COMPREHEN METABOLIC PANEL: CPT | Performed by: EMERGENCY MEDICINE

## 2024-07-20 PROCEDURE — 85049 AUTOMATED PLATELET COUNT: CPT | Performed by: FAMILY MEDICINE

## 2024-07-20 PROCEDURE — 84484 ASSAY OF TROPONIN QUANT: CPT | Performed by: FAMILY MEDICINE

## 2024-07-20 PROCEDURE — 96360 HYDRATION IV INFUSION INIT: CPT

## 2024-07-20 PROCEDURE — 84484 ASSAY OF TROPONIN QUANT: CPT | Performed by: EMERGENCY MEDICINE

## 2024-07-20 RX ORDER — ASPIRIN 325 MG
325 TABLET ORAL ONCE
Status: COMPLETED | OUTPATIENT
Start: 2024-07-20 | End: 2024-07-20

## 2024-07-20 RX ORDER — ONDANSETRON 2 MG/ML
4 INJECTION INTRAMUSCULAR; INTRAVENOUS EVERY 6 HOURS PRN
Status: DISCONTINUED | OUTPATIENT
Start: 2024-07-20 | End: 2024-07-23 | Stop reason: HOSPADM

## 2024-07-20 RX ORDER — PANTOPRAZOLE SODIUM 20 MG/1
20 TABLET, DELAYED RELEASE ORAL
Status: DISCONTINUED | OUTPATIENT
Start: 2024-07-21 | End: 2024-07-23 | Stop reason: HOSPADM

## 2024-07-20 RX ORDER — NICOTINE 21 MG/24HR
1 PATCH, TRANSDERMAL 24 HOURS TRANSDERMAL DAILY
Status: DISCONTINUED | OUTPATIENT
Start: 2024-07-20 | End: 2024-07-23 | Stop reason: HOSPADM

## 2024-07-20 RX ORDER — FLUTICASONE FUROATE AND VILANTEROL 200; 25 UG/1; UG/1
1 POWDER RESPIRATORY (INHALATION)
Status: DISCONTINUED | OUTPATIENT
Start: 2024-07-21 | End: 2024-07-23 | Stop reason: HOSPADM

## 2024-07-20 RX ORDER — ENOXAPARIN SODIUM 100 MG/ML
40 INJECTION SUBCUTANEOUS DAILY
Status: DISCONTINUED | OUTPATIENT
Start: 2024-07-20 | End: 2024-07-21

## 2024-07-20 RX ORDER — ATORVASTATIN CALCIUM 40 MG/1
80 TABLET, FILM COATED ORAL EVERY EVENING
Status: DISCONTINUED | OUTPATIENT
Start: 2024-07-20 | End: 2024-07-23 | Stop reason: HOSPADM

## 2024-07-20 RX ORDER — LORATADINE 10 MG/1
10 TABLET ORAL DAILY
Status: DISCONTINUED | OUTPATIENT
Start: 2024-07-20 | End: 2024-07-23 | Stop reason: HOSPADM

## 2024-07-20 RX ORDER — ACETAMINOPHEN 325 MG/1
650 TABLET ORAL EVERY 6 HOURS PRN
Status: DISCONTINUED | OUTPATIENT
Start: 2024-07-20 | End: 2024-07-23 | Stop reason: HOSPADM

## 2024-07-20 RX ORDER — DIVALPROEX SODIUM 500 MG/1
500 TABLET, EXTENDED RELEASE ORAL 2 TIMES DAILY
Status: DISCONTINUED | OUTPATIENT
Start: 2024-07-20 | End: 2024-07-23 | Stop reason: HOSPADM

## 2024-07-20 RX ORDER — ASPIRIN 81 MG/1
81 TABLET, CHEWABLE ORAL DAILY
Status: DISCONTINUED | OUTPATIENT
Start: 2024-07-21 | End: 2024-07-23 | Stop reason: HOSPADM

## 2024-07-20 RX ORDER — ALBUTEROL SULFATE 90 UG/1
2 AEROSOL, METERED RESPIRATORY (INHALATION) EVERY 6 HOURS PRN
Status: DISCONTINUED | OUTPATIENT
Start: 2024-07-20 | End: 2024-07-23 | Stop reason: HOSPADM

## 2024-07-20 RX ORDER — LIDOCAINE 50 MG/G
1 PATCH TOPICAL ONCE
Status: DISCONTINUED | OUTPATIENT
Start: 2024-07-20 | End: 2024-07-20

## 2024-07-20 RX ORDER — ACETAMINOPHEN 325 MG/1
975 TABLET ORAL EVERY 8 HOURS SCHEDULED
Status: DISCONTINUED | OUTPATIENT
Start: 2024-07-20 | End: 2024-07-23 | Stop reason: HOSPADM

## 2024-07-20 RX ORDER — METHOCARBAMOL 500 MG/1
1000 TABLET, FILM COATED ORAL ONCE
Status: COMPLETED | OUTPATIENT
Start: 2024-07-20 | End: 2024-07-20

## 2024-07-20 RX ORDER — DICYCLOMINE HCL 20 MG
20 TABLET ORAL EVERY 6 HOURS
Status: DISCONTINUED | OUTPATIENT
Start: 2024-07-20 | End: 2024-07-23 | Stop reason: HOSPADM

## 2024-07-20 RX ORDER — LIDOCAINE 50 MG/G
1 PATCH TOPICAL DAILY
Status: DISCONTINUED | OUTPATIENT
Start: 2024-07-20 | End: 2024-07-23 | Stop reason: HOSPADM

## 2024-07-20 RX ADMIN — LORATADINE 10 MG: 10 TABLET ORAL at 15:40

## 2024-07-20 RX ADMIN — ATORVASTATIN CALCIUM 80 MG: 40 TABLET, FILM COATED ORAL at 17:16

## 2024-07-20 RX ADMIN — DICYCLOMINE HYDROCHLORIDE 20 MG: 20 TABLET ORAL at 15:41

## 2024-07-20 RX ADMIN — ACETAMINOPHEN 975 MG: 325 TABLET, FILM COATED ORAL at 15:40

## 2024-07-20 RX ADMIN — LIDOCAINE 1 PATCH: 50 PATCH CUTANEOUS at 12:01

## 2024-07-20 RX ADMIN — DICYCLOMINE HYDROCHLORIDE 20 MG: 20 TABLET ORAL at 22:39

## 2024-07-20 RX ADMIN — SODIUM CHLORIDE 1000 ML: 0.9 INJECTION, SOLUTION INTRAVENOUS at 10:55

## 2024-07-20 RX ADMIN — ENOXAPARIN SODIUM 40 MG: 40 INJECTION SUBCUTANEOUS at 15:40

## 2024-07-20 RX ADMIN — ACETAMINOPHEN 975 MG: 325 TABLET, FILM COATED ORAL at 23:35

## 2024-07-20 RX ADMIN — LIDOCAINE 1 PATCH: 50 PATCH CUTANEOUS at 15:41

## 2024-07-20 RX ADMIN — METHOCARBAMOL TABLETS 1000 MG: 500 TABLET, COATED ORAL at 12:01

## 2024-07-20 RX ADMIN — ASPIRIN 325 MG ORAL TABLET 325 MG: 325 PILL ORAL at 10:55

## 2024-07-20 RX ADMIN — DIVALPROEX SODIUM 500 MG: 500 TABLET, FILM COATED, EXTENDED RELEASE ORAL at 17:16

## 2024-07-20 NOTE — H&P
[facility]  H&P  Name: Renee Peterson 54 y.o. female I MRN: [id number]  Unit/Bed#: ED 28 I Date of Admission: 7/20/2024   Date of Service: 7/20/2024 I Hospital Day: 0      Assessment & Plan   * Chest pain  Assessment & Plan  Cardiac enzymes went up slightly.  Delta was slightly elevated.   NAVEED score 1  Monitor cardiac enzymes  Appears to be more musculoskeletal on examination  Can check echo  Depending on symptoms tomorrow and troponins can consider further inpatient ischemic workup versus outpatient if necessary.  Hold off on cardiac consultation for now    Tobacco abuse  Assessment & Plan  Encourage cessation    Fatigue  Assessment & Plan  Check a TSH.  The patient apparently had a thyroid procedure done years ago.  Can get an outpatient workup for this    Psychiatric diagnosis  Assessment & Plan  Patient has a history of bipolar.  Continue Depakote    Gastroesophageal reflux disease  Assessment & Plan  Continue PPI         VTE Pharmacologic Prophylaxis:   Low Risk (Score 0-2) - Encourage Ambulation.  Code Status: No Order full code  Discussion with family:  Significant other other bedside.     Anticipated Length of Stay: Patient will be admitted on an observation basis with an anticipated length of stay of less than 2 midnights secondary to chest pain monitoring.    Total Time Spent on Date of Encounter in care of patient: 45 mins. This time was spent on one or more of the following: performing physical exam; counseling and coordination of care; obtaining or reviewing history; documenting in the medical record; reviewing/ordering tests, medications or procedures; communicating with other healthcare professionals and discussing with patient's family/caregivers.    Chief Complaint: Chest pain    History of Present Illness:  Renee Peterson is a 54 y.o. female with a PMH of bipolar disorder, reflux who presents with pain.  Patient states that chest pain has been going on for couple weeks.  Is  present kind of all the time.  Sometimes when she rests it gets a little better.  She gets intermittent shortness of breath.  Does not recall any IV injury or lifting.  Patient states the pain is kind of present all the time.  Once in a while she does experience palpitations.  Difficulty in describing the pain she states sometimes is sharp sometimes dull sometimes an ache.  It goes from below her left breast to the side of the breast to the mid back.  No urinary complaints..  Patient also feels very fatigued all the time    Review of Systems:  Review of Systems   Constitutional:  Positive for appetite change and fatigue.   Respiratory:  Positive for shortness of breath.    Cardiovascular:  Positive for chest pain and palpitations.   Neurological:  Positive for weakness.   Psychiatric/Behavioral:  The patient is nervous/anxious.    All other systems reviewed and are negative.      Past Medical and Surgical History:   Past Medical History:   Diagnosis Date    GERD (gastroesophageal reflux disease)     Psychiatric disorder        Past Surgical History:   Procedure Laterality Date    CHOLECYSTECTOMY      TUBAL LIGATION         Meds/Allergies:  Prior to Admission medications    Medication Sig Start Date End Date Taking? Authorizing Provider   albuterol (PROVENTIL HFA,VENTOLIN HFA) 90 mcg/act inhaler Inhale 2 puffs every 6 (six) hours as needed 1/6/22   Historical Provider, MD   celecoxib (CeleBREX) 200 mg capsule Take 200 mg by mouth 2 (two) times a day as needed 8/13/21 8/13/22  Historical Provider, MD   Diclofenac Sodium (VOLTAREN) 1 % Apply 1 application topically 4 (four) times a day 4/7/21   Historical Provider, MD   dicyclomine (BENTYL) 20 mg tablet Take 20 mg by mouth every 6 (six) hours 9/4/20   Historical Provider, MD   divalproex sodium (DEPAKOTE ER) 500 mg 24 hr tablet Take 500 mg by mouth 2 (two) times a day 1/29/18   Historical Provider, MD   famotidine (PEPCID) 20 mg tablet TAKE TWO TABLETS BY MOUTH AT  BEDTIME 10/24/22   Johnnie Santana MD   fluticasone (Flovent HFA) 44 mcg/act inhaler Inhale 2 puffs 2 (two) times a day 7/2/20 7/2/21  Historical Provider, MD   Fluticasone-Salmeterol (Advair) 100-50 mcg/dose inhaler  1/5/23   Historical Provider, MD   hydrocortisone 2.5 % cream Insert into the rectum 2 (two) times a day. 8/31/20   Historical Provider, MD   ibuprofen (MOTRIN) 600 mg tablet  2/2/23   Historical Provider, MD   lidocaine (LIDODERM) 5 % Place 1 patch on the skin every 12 (twelve) hours. Remove & Discard patch within 12 hours or as directed by MD 12/1/22   Historical Provider, MD   loratadine (CLARITIN) 10 mg tablet Take 10 mg by mouth daily 1/29/18   Historical Provider, MD   methylPREDNISolone 4 MG tablet therapy pack Take as directed per package instructions. 2/20/23   Historical Provider, MD   montelukast (SINGULAIR) 10 mg tablet Take 1 tablet (10 mg total) by mouth nightly. 10/20/21   Historical Provider, MD   Multiple Vitamins-Calcium (ONE-A-DAY WOMENS PO) Take 1 tablet by mouth daily. 1/29/19   Historical Provider, MD   naproxen (NAPROSYN) 500 mg tablet Take 1 tablet (500 mg total) by mouth 2 (two) times a day with meals 9/6/20   Abby Avina DO   NIX CREME RINSE 1 % liquid Apply topically one time for 1 dose. Reapply in one week 12/4/18   Historical Provider, MD   omeprazole (PriLOSEC) 40 MG capsule  2/2/23   Historical Provider, MD   ondansetron (ZOFRAN) 4 mg tablet Take 4 mg by mouth 8/31/20   Historical Provider, MD   polyethylene glycol (GLYCOLAX) 17 GM/SCOOP powder Take 17 g by mouth daily For hard stools - can decrease to every other day if stools become loose 3/23/23 4/22/23  Chelle Saxena PA-C   amitriptyline (ELAVIL) 25 mg tablet Take 1 tablet (25 mg total) by mouth daily at bedtime 4/8/22 7/20/24  Chelle Saxena PA-C   budesonide 9 mg TB24  1/6/23 7/20/24  Historical Provider, MD   citalopram (CeleXA) 40 mg tablet Take 40 mg by mouth daily  7/20/24  Historical Provider, MD  "  cyclobenzaprine (FLEXERIL) 5 mg tablet  3/10/23 7/20/24  Historical Provider, MD   divalproex sodium (DEPAKOTE) 125 mg EC tablet Take 125 mg by mouth 2 (two) times a day 4/10/21 7/20/24  Historical Provider, MD   gabapentin (NEURONTIN) 300 mg capsule Take 300 mg by mouth 2 (two) times a day  7/20/24  Historical Provider, MD   hydrOXYzine HCL (ATARAX) 50 mg tablet Take one (1) tablet by mouth three times a day as needed 1/5/22 7/20/24  Historical Provider, MD   methocarbamol (ROBAXIN) 750 mg tablet Take 750 mg by mouth Three times daily as needed  7/20/24  Historical Provider, MD   mirtazapine (REMERON) 15 mg tablet Take one (1) tablet by mouth at bedtime as needed for sleep 1/5/22 7/20/24  Historical Provider, MD   sulfamethoxazole-trimethoprim (BACTRIM DS) 800-160 mg per tablet TAKE ONE TABLET BY MOUTH TWICE DAILY FOR 7 DAYS 7/2/20 7/20/24  Historical Provider, MD   traZODone (DESYREL) 50 mg tablet Take 50 mg by mouth  7/20/24  Historical Provider, MD     I have reviewed home medications with patient personally.    Allergies:   Allergies   Allergen Reactions    Bee Venom     Beeswax     Penicillins      Pt unsure as to what happens        Social History:  Marital Status:      Patient Pre-hospital Living Situation: Home  Patient Pre-hospital Level of Mobility: walks  Patient Pre-hospital Diet Restrictions: None  Substance Use History:   Social History     Substance and Sexual Activity   Alcohol Use No     Social History     Tobacco Use   Smoking Status Every Day    Current packs/day: 1.00    Types: Cigarettes   Smokeless Tobacco Never     Social History     Substance and Sexual Activity   Drug Use No       Family History:  History reviewed. No pertinent family history.    Physical Exam:     Vitals:   Blood Pressure: 125/81 (07/20/24 1230)  Pulse: 95 (07/20/24 1230)  Temperature: 98.8 °F (37.1 °C) (07/20/24 1002)  Temp Source: Oral (07/20/24 1002)  Respirations: 13 (07/20/24 1230)  Height: 5' 7\" (170.2 cm) " (07/20/24 1002)  Weight - Scale: 72.7 kg (160 lb 4.4 oz) (07/20/24 1002)  SpO2: 99 % (07/20/24 1230)    Physical Exam   General Appearance:    Alert, cooperative, no distress, appears stated age   Head:    Normocephalic, without obvious abnormality, atraumatic   Eyes:    PERRL, conjunctiva/corneas clear, EOM's intact,             Nose:   Nares normal, septum midline, mucosa normal   Throat:   Lips, mucosa, and tongue normal; teeth and gums normal   Neck:   Supple, symmetrical, no adenopathy;        thyroid:  No enlargement/tenderness/nodules; no carotid    bruit or JVD   Back:     Symmetric, no curvature, ROM normal, no CVA tenderness   Lungs:     Clear to auscultation bilaterally, respirations unlabored       Heart:    Regular rate and rhythm, S1 and S2 normal, no murmur, rub    or gallop.  There is reproducibility of the chest pain left of the left breast as well as in the mid thoracic back on the left   Abdomen:     Soft, non-tender, bowel sounds active all four quadrants,     no masses, no organomegaly           Extremities:   Extremities normal, atraumatic, no cyanosis or edema   Pulses:   2+ and symmetric all extremities   Skin:   Skin color, texture, turgor normal, no rashes or lesions   Lymph nodes:   Cervical, supraclavicular, and axillary nodes normal   Neurologic:   CNII-XII intact. Normal strength, sensation and reflexes       throughout         Additional Data:     Lab Results:  Results from last 7 days   Lab Units 07/20/24  1056   WBC Thousand/uL 8.07   HEMOGLOBIN g/dL 13.3   HEMATOCRIT % 38.9   PLATELETS Thousands/uL 321   SEGS PCT % 65   LYMPHO PCT % 22   MONO PCT % 11   EOS PCT % 1     Results from last 7 days   Lab Units 07/20/24  1056   SODIUM mmol/L 142   POTASSIUM mmol/L 3.8   CHLORIDE mmol/L 107   CO2 mmol/L 29   BUN mg/dL 23   CREATININE mg/dL 0.81   ANION GAP mmol/L 6   CALCIUM mg/dL 8.9   ALBUMIN g/dL 4.0   TOTAL BILIRUBIN mg/dL 0.25   ALK PHOS U/L 69   ALT U/L 9   AST U/L 12*   GLUCOSE  "RANDOM mg/dL 100             No results found for: \"HGBA1C\"      X-ray personally reviewed.  There is no acute cardiopulmonary disease  Lines/Drains:  Invasive Devices       Peripheral Intravenous Line  Duration             Peripheral IV 09/06/20 Left Hand 1413 days    Peripheral IV 07/20/24 Right Antecubital <1 day                        Imaging:       EKG and Other Studies Reviewed on Admission:   Nonspecific changes no ST-T wave abnormalities    ** Please Note: This note has been constructed using a voice recognition system. **      "

## 2024-07-20 NOTE — ED PROVIDER NOTES
Pt Name: Renee Peterson  MRN: [id number]  Birthdate 1970  Age/Sex: 54 y.o. female  Date of evaluation: 7/20/2024  PCP: No primary care provider on file.    CHIEF COMPLAINT    Chief Complaint   Patient presents with    Abdominal Pain     Patient co LUQ abdominal pain that radiates to left back. Denies N/V. Symptoms started approx 1 month ago.          HPI and MDM    54 y.o. female presenting with chest pain.  Patient does not complain of abdominal pain to me, complains of chest pain more so, points towards left side of her chest just underneath her breast, states it wraps around to her back.  No exertional symptoms, no shortness of breath.  Does mention some nausea but no vomiting.  No dizziness.  Pain has been intermittent for a month however over the last week or so has been more steady.  No rash.  Does not recall any trauma.  Denies any rash.      Differential diagnosis considered includes but not limited to ACS, PE, musculoskeletal pain, pneumothorax.    Per my independent interpretation of EKG sinus tachycardia with heart rate of 106, wide QRS, intervals are reassuring, no STEMI, right bundle branch block.    Per my independent interpretation of chest x-ray, no acute cardiopulmonary processes.    Initial troponin was 10, 2 hours delta is 20.  Discussed with internal medicine for hospitalization, patient updated with results and plan.          Medications   dicyclomine (BENTYL) tablet 20 mg (20 mg Oral Given 7/20/24 1541)   divalproex sodium (DEPAKOTE ER) 24 hr tablet 500 mg (has no administration in time range)   fluticasone-vilanterol 200-25 mcg/actuation 1 puff (has no administration in time range)   loratadine (CLARITIN) tablet 10 mg (10 mg Oral Given 7/20/24 1540)   lidocaine (LIDODERM) 5 % patch 1 patch (1 patch Topical Medication Applied 7/20/24 1541)   pantoprazole (PROTONIX) EC tablet 20 mg (has no administration in time range)   albuterol (PROVENTIL HFA,VENTOLIN HFA) inhaler 2 puff (has no  administration in time range)   acetaminophen (TYLENOL) tablet 650 mg (has no administration in time range)   ondansetron (ZOFRAN) injection 4 mg (has no administration in time range)   nicotine (NICODERM CQ) 21 mg/24 hr TD 24 hr patch 1 patch (1 patch Transdermal Not Given 7/20/24 1541)   atorvastatin (LIPITOR) tablet 80 mg (has no administration in time range)   aspirin chewable tablet 81 mg (has no administration in time range)   enoxaparin (LOVENOX) subcutaneous injection 40 mg (40 mg Subcutaneous Given 7/20/24 1540)   acetaminophen (TYLENOL) tablet 975 mg (975 mg Oral Given 7/20/24 1540)   sodium chloride 0.9 % bolus 1,000 mL (0 mL Intravenous Stopped 7/20/24 1155)   aspirin tablet 325 mg (325 mg Oral Given 7/20/24 1055)   methocarbamol (ROBAXIN) tablet 1,000 mg (1,000 mg Oral Given 7/20/24 1201)         Past Medical and Surgical History    Past Medical History:   Diagnosis Date    GERD (gastroesophageal reflux disease)     Psychiatric disorder        Past Surgical History:   Procedure Laterality Date    CHOLECYSTECTOMY      TUBAL LIGATION         History reviewed. No pertinent family history.    Social History     Tobacco Use    Smoking status: Every Day     Current packs/day: 1.00     Types: Cigarettes    Smokeless tobacco: Never   Vaping Use    Vaping status: Never Used   Substance Use Topics    Alcohol use: No    Drug use: No           Allergies    Allergies   Allergen Reactions    Bee Venom     Beeswax     Penicillins      Pt unsure as to what happens        Home Medications    Prior to Admission medications    Medication Sig Start Date End Date Taking? Authorizing Provider   albuterol (PROVENTIL HFA,VENTOLIN HFA) 90 mcg/act inhaler Inhale 2 puffs every 6 (six) hours as needed 1/6/22   Historical Provider, MD   amitriptyline (ELAVIL) 25 mg tablet Take 1 tablet (25 mg total) by mouth daily at bedtime 4/8/22   Chelle Saxena PA-C   budesonide 9 mg TB24  1/6/23   Historical Provider, MD   celecoxib  (CeleBREX) 200 mg capsule Take 200 mg by mouth 2 (two) times a day as needed 8/13/21 8/13/22  Historical Provider, MD   citalopram (CeleXA) 40 mg tablet Take 40 mg by mouth daily    Historical Provider, MD   cyclobenzaprine (FLEXERIL) 5 mg tablet  3/10/23   Historical Provider, MD   Diclofenac Sodium (VOLTAREN) 1 % Apply 1 application topically 4 (four) times a day 4/7/21   Historical Provider, MD   dicyclomine (BENTYL) 20 mg tablet Take 20 mg by mouth every 6 (six) hours 9/4/20   Historical Provider, MD   divalproex sodium (DEPAKOTE ER) 500 mg 24 hr tablet Take 500 mg by mouth 2 (two) times a day 1/29/18   Historical Provider, MD   divalproex sodium (DEPAKOTE) 125 mg EC tablet Take 125 mg by mouth 2 (two) times a day 4/10/21   Historical Provider, MD   famotidine (PEPCID) 20 mg tablet TAKE TWO TABLETS BY MOUTH AT BEDTIME 10/24/22   Johnnie Santana MD   fluticasone (Flovent HFA) 44 mcg/act inhaler Inhale 2 puffs 2 (two) times a day 7/2/20 7/2/21  Historical Provider, MD   Fluticasone-Salmeterol (Advair) 100-50 mcg/dose inhaler  1/5/23   Historical Provider, MD   gabapentin (NEURONTIN) 300 mg capsule Take 300 mg by mouth 2 (two) times a day    Historical Provider, MD   hydrocortisone 2.5 % cream Insert into the rectum 2 (two) times a day. 8/31/20   Historical Provider, MD   hydrOXYzine HCL (ATARAX) 50 mg tablet Take one (1) tablet by mouth three times a day as needed 1/5/22   Historical Provider, MD   ibuprofen (MOTRIN) 600 mg tablet  2/2/23   Historical Provider, MD   lidocaine (LIDODERM) 5 % Place 1 patch on the skin every 12 (twelve) hours. Remove & Discard patch within 12 hours or as directed by MD 12/1/22   Historical Provider, MD   loratadine (CLARITIN) 10 mg tablet Take 10 mg by mouth daily 1/29/18   Historical Provider, MD   methocarbamol (ROBAXIN) 750 mg tablet Take 750 mg by mouth Three times daily as needed    Historical Provider, MD   methylPREDNISolone 4 MG tablet therapy pack Take as directed per  package instructions. 2/20/23   Historical Provider, MD   mirtazapine (REMERON) 15 mg tablet Take one (1) tablet by mouth at bedtime as needed for sleep 1/5/22   Yuridia Provider, MD   montelukast (SINGULAIR) 10 mg tablet Take 1 tablet (10 mg total) by mouth nightly. 10/20/21   Historical Provider, MD   Multiple Vitamins-Calcium (ONE-A-DAY WOMENS PO) Take 1 tablet by mouth daily. 1/29/19   Yuridia Provider, MD   naproxen (NAPROSYN) 500 mg tablet Take 1 tablet (500 mg total) by mouth 2 (two) times a day with meals 9/6/20   Abby Avina DO   NIX CREME RINSE 1 % liquid Apply topically one time for 1 dose. Reapply in one week 12/4/18   Yuridia Provider, MD   omeprazole (PriLOSEC) 40 MG capsule  2/2/23   Historical Provider, MD   ondansetron (ZOFRAN) 4 mg tablet Take 4 mg by mouth 8/31/20   Historical Provider, MD   polyethylene glycol (GLYCOLAX) 17 GM/SCOOP powder Take 17 g by mouth daily For hard stools - can decrease to every other day if stools become loose 3/23/23 4/22/23  Chelle Saxena PA-C   sulfamethoxazole-trimethoprim (BACTRIM DS) 800-160 mg per tablet TAKE ONE TABLET BY MOUTH TWICE DAILY FOR 7 DAYS 7/2/20   Historical Provider, MD   traZODone (DESYREL) 50 mg tablet Take 50 mg by mouth    Historical Provider, MD           Physical Exam      ED Triage Vitals   Temperature Pulse Respirations Blood Pressure SpO2   07/20/24 1002 07/20/24 1002 07/20/24 1002 07/20/24 1002 07/20/24 1002   98.8 °F (37.1 °C) (!) 110 18 167/85 98 %      Temp Source Heart Rate Source Patient Position - Orthostatic VS BP Location FiO2 (%)   07/20/24 1002 07/20/24 1002 07/20/24 1002 07/20/24 1002 --   Oral Monitor Sitting Left arm       Pain Score       07/20/24 1125       7               Physical Exam  Constitutional:       General: She is not in acute distress.     Appearance: She is not ill-appearing.   HENT:      Head: Normocephalic and atraumatic.      Nose: Nose normal.      Mouth/Throat:      Mouth: Mucous  membranes are moist.   Eyes:      Extraocular Movements: Extraocular movements intact.      Pupils: Pupils are equal, round, and reactive to light.   Cardiovascular:      Rate and Rhythm: Normal rate and regular rhythm.   Pulmonary:      Effort: No respiratory distress.      Breath sounds: Normal breath sounds. No wheezing.      Comments: Left lower chest wall just underneath the left breast tender to palpation.  Abdominal:      General: Abdomen is flat. There is no distension.      Palpations: Abdomen is soft. There is no mass.      Tenderness: There is no abdominal tenderness. There is no guarding or rebound.   Musculoskeletal:         General: No swelling or deformity. Normal range of motion.      Cervical back: Normal range of motion and neck supple.      Comments: Left mid back tenderness to palpation bilaterally, no midline spinal tenderness or step-offs.   Skin:     General: Skin is warm.      Findings: No erythema.   Neurological:      Mental Status: She is alert and oriented to person, place, and time. Mental status is at baseline.              Diagnostic Results      Labs:    Results Reviewed       Procedure Component Value Units Date/Time    HS Troponin I 4hr [[id number]] Collected: 07/20/24 1631    Lab Status: No result Specimen: Blood from Hand, Right     HS Troponin I 2hr [[id number]]  (Abnormal) Collected: 07/20/24 1239    Lab Status: Final result Specimen: Blood from Arm, Left Updated: 07/20/24 1315     hs TnI 2hr 30 ng/L      Delta 2hr hsTnI 20 ng/L     HS Troponin 0hr (reflex protocol) [[id number]]  (Normal) Collected: 07/20/24 1056    Lab Status: Final result Specimen: Blood from Arm, Right Updated: 07/20/24 1133     hs TnI 0hr 10 ng/L     Comprehensive metabolic panel [[id number]]  (Abnormal) Collected: 07/20/24 1056    Lab Status: Final result Specimen: Blood from Arm, Right Updated: 07/20/24 1127     Sodium 142 mmol/L      Potassium 3.8 mmol/L      Chloride 107 mmol/L      CO2 29 mmol/L      ANION  GAP 6 mmol/L      BUN 23 mg/dL      Creatinine 0.81 mg/dL      Glucose 100 mg/dL      Calcium 8.9 mg/dL      AST 12 U/L      ALT 9 U/L      Alkaline Phosphatase 69 U/L      Total Protein 6.5 g/dL      Albumin 4.0 g/dL      Total Bilirubin 0.25 mg/dL      eGFR 83 ml/min/1.73sq m     Narrative:      [employer] guidelines for Chronic Kidney Disease (CKD):     Stage 1 with normal or high GFR (GFR > 90 mL/min/1.73 square meters)    Stage 2 Mild CKD (GFR = 60-89 mL/min/1.73 square meters)    Stage 3A Moderate CKD (GFR = 45-59 mL/min/1.73 square meters)    Stage 3B Moderate CKD (GFR = 30-44 mL/min/1.73 square meters)    Stage 4 Severe CKD (GFR = 15-29 mL/min/1.73 square meters)    Stage 5 End Stage CKD (GFR <15 mL/min/1.73 square meters)  Note: GFR calculation is accurate only with a steady state creatinine    Lipase [[id number]]  (Abnormal) Collected: 07/20/24 1056    Lab Status: Final result Specimen: Blood from Arm, Right Updated: 07/20/24 1127     Lipase 10 u/L     D-Dimer [[id number]]  (Normal) Collected: 07/20/24 1056    Lab Status: Final result Specimen: Blood from Arm, Right Updated: 07/20/24 1122     D-Dimer, Quant 0.28 ug/ml FEU     Narrative:      In the evaluation for possible pulmonary embolism, in the appropriate (Well's Score of 4 or less) patient, the age adjusted d-dimer cutoff for this patient can be calculated as:    Age x 0.01 (in ug/mL) for Age-adjusted D-dimer exclusion threshold for a patient over 50 years.    CBC and differential [[id number]]  (Abnormal) Collected: 07/20/24 1056    Lab Status: Final result Specimen: Blood from Arm, Right Updated: 07/20/24 1104     WBC 8.07 Thousand/uL      RBC 3.96 Million/uL      Hemoglobin 13.3 g/dL      Hematocrit 38.9 %      MCV 98 fL      MCH 33.6 pg      MCHC 34.2 g/dL      RDW 12.4 %      MPV 8.8 fL      Platelets 321 Thousands/uL      nRBC 0 /100 WBCs      Segmented % 65 %      Immature Grans % 0 %      Lymphocytes % 22 %      Monocytes  % 11 %      Eosinophils Relative 1 %      Basophils Relative 1 %      Absolute Neutrophils 5.34 Thousands/µL      Absolute Immature Grans 0.01 Thousand/uL      Absolute Lymphocytes 1.77 Thousands/µL      Absolute Monocytes 0.87 Thousand/µL      Eosinophils Absolute 0.04 Thousand/µL      Basophils Absolute 0.04 Thousands/µL             All labs reviewed and utilized in the medical decision making process    Radiology:    XR chest 2 views   Final Result      No acute cardiopulmonary disease.               Workstation performed: [id number]             All radiology studies independently viewed by me and interpreted by the radiologist.    Procedure    Procedures        FINAL IMPRESSION    Final diagnoses:   Chest pain, unspecified type         DISPOSITION    Time reflects when diagnosis was documented in both MDM as applicable and the Disposition within this note       Time User Action Codes Description Comment    7/20/2024  2:26 PM Mallorie Farah Add [R07.9] Chest pain, unspecified type           ED Disposition       ED Disposition   Admit    Condition   Stable    Date/Time   Sat Jul 20, 2024  2:26 PM    Comment   Case was discussed with Dr. Taco Dubois and the patient's admission status was agreed to be Admission Status: observation status to the service of Dr. Taco Dubois.               Follow-up Information    None           PATIENT REFERRED TO:    No follow-up provider specified.    DISCHARGE MEDICATIONS:    Current Discharge Medication List        CONTINUE these medications which have NOT CHANGED    Details   albuterol (PROVENTIL HFA,VENTOLIN HFA) 90 mcg/act inhaler Inhale 2 puffs every 6 (six) hours as needed      celecoxib (CeleBREX) 200 mg capsule Take 200 mg by mouth 2 (two) times a day as needed      Diclofenac Sodium (VOLTAREN) 1 % Apply 1 application topically 4 (four) times a day      dicyclomine (BENTYL) 20 mg tablet Take 20 mg by mouth every 6 (six) hours      divalproex sodium (DEPAKOTE ER) 500 mg 24 hr  tablet Take 500 mg by mouth 2 (two) times a day  Refills: 2      famotidine (PEPCID) 20 mg tablet TAKE TWO TABLETS BY MOUTH AT BEDTIME  Qty: 60 tablet, Refills: 2    Comments: This prescription was filled on 10/24/2022. Any refills authorized will be placed on file.  Associated Diagnoses: Gastroesophageal reflux disease without esophagitis      fluticasone (Flovent HFA) 44 mcg/act inhaler Inhale 2 puffs 2 (two) times a day      Fluticasone-Salmeterol (Advair) 100-50 mcg/dose inhaler       hydrocortisone 2.5 % cream Insert into the rectum 2 (two) times a day.      ibuprofen (MOTRIN) 600 mg tablet       lidocaine (LIDODERM) 5 % Place 1 patch on the skin every 12 (twelve) hours. Remove & Discard patch within 12 hours or as directed by MD      loratadine (CLARITIN) 10 mg tablet Take 10 mg by mouth daily  Refills: 5      methylPREDNISolone 4 MG tablet therapy pack Take as directed per package instructions.      montelukast (SINGULAIR) 10 mg tablet Take 1 tablet (10 mg total) by mouth nightly.      Multiple Vitamins-Calcium (ONE-A-DAY WOMENS PO) Take 1 tablet by mouth daily.  Refills: 11      naproxen (NAPROSYN) 500 mg tablet Take 1 tablet (500 mg total) by mouth 2 (two) times a day with meals  Qty: 30 tablet, Refills: 0    Associated Diagnoses: Chest pain, unspecified type      NIX CREME RINSE 1 % liquid Apply topically one time for 1 dose. Reapply in one week  Refills: 2      omeprazole (PriLOSEC) 40 MG capsule       ondansetron (ZOFRAN) 4 mg tablet Take 4 mg by mouth      polyethylene glycol (GLYCOLAX) 17 GM/SCOOP powder Take 17 g by mouth daily For hard stools - can decrease to every other day if stools become loose  Qty: 510 g, Refills: 1    Associated Diagnoses: Constipation, unspecified constipation type             No discharge procedures on file.         Mallorie Farah,         This note was partially completed using voice recognition technology, and was scanned for gross errors; however some errors may still  exist. Please contact the author with any questions or requests for clarification.      Mallorie Farah, DO  07/20/24 0796

## 2024-07-20 NOTE — ASSESSMENT & PLAN NOTE
Check a TSH.  The patient apparently had a thyroid procedure done years ago.  Can get an outpatient workup for this

## 2024-07-21 ENCOUNTER — APPOINTMENT (OUTPATIENT)
Dept: NON INVASIVE DIAGNOSTICS | Facility: HOSPITAL | Age: 54
DRG: 192 | End: 2024-07-21
Payer: COMMERCIAL

## 2024-07-21 LAB
2HR DELTA HS TROPONIN: -40 NG/L
4HR DELTA HS TROPONIN: -124 NG/L
ANION GAP SERPL CALCULATED.3IONS-SCNC: 6 MMOL/L (ref 4–13)
AORTIC ROOT: 3.3 CM
APICAL FOUR CHAMBER EJECTION FRACTION: 63 %
APTT PPP: 31 SECONDS (ref 23–37)
APTT PPP: 50 SECONDS (ref 23–37)
APTT PPP: 61 SECONDS (ref 23–37)
ASCENDING AORTA: 3.3 CM
ATRIAL RATE: 79 BPM
ATRIAL RATE: 84 BPM
BSA FOR ECHO PROCEDURE: 1.84 M2
BUN SERPL-MCNC: 15 MG/DL (ref 5–25)
CALCIUM SERPL-MCNC: 8.5 MG/DL (ref 8.4–10.2)
CARDIAC TROPONIN I PNL SERPL HS: 105 NG/L
CARDIAC TROPONIN I PNL SERPL HS: 189 NG/L
CARDIAC TROPONIN I PNL SERPL HS: 229 NG/L
CHLORIDE SERPL-SCNC: 107 MMOL/L (ref 96–108)
CHOLEST SERPL-MCNC: 181 MG/DL
CO2 SERPL-SCNC: 28 MMOL/L (ref 21–32)
CREAT SERPL-MCNC: 0.68 MG/DL (ref 0.6–1.3)
E WAVE DECELERATION TIME: 173 MS
E/A RATIO: 1.04
ERYTHROCYTE [DISTWIDTH] IN BLOOD BY AUTOMATED COUNT: 12.5 % (ref 11.6–15.1)
FRACTIONAL SHORTENING: 39 (ref 28–44)
GFR SERPL CREATININE-BSD FRML MDRD: 100 ML/MIN/1.73SQ M
GLUCOSE P FAST SERPL-MCNC: 102 MG/DL (ref 65–99)
GLUCOSE SERPL-MCNC: 102 MG/DL (ref 65–140)
HCT VFR BLD AUTO: 38.2 % (ref 34.8–46.1)
HDLC SERPL-MCNC: 46 MG/DL
HGB BLD-MCNC: 12.9 G/DL (ref 11.5–15.4)
INR PPP: 0.88 (ref 0.84–1.19)
INTERVENTRICULAR SEPTUM IN DIASTOLE (PARASTERNAL SHORT AXIS VIEW): 0.8 CM
INTERVENTRICULAR SEPTUM: 0.8 CM (ref 0.6–1.1)
LAAS-AP2: 13.4 CM2
LAAS-AP4: 13.3 CM2
LDLC SERPL CALC-MCNC: 110 MG/DL (ref 0–100)
LEFT ATRIUM AREA SYSTOLE SINGLE PLANE A4C: 14 CM2
LEFT ATRIUM SIZE: 3 CM
LEFT ATRIUM VOLUME (MOD BIPLANE): 31 ML
LEFT ATRIUM VOLUME INDEX (MOD BIPLANE): 16.8 ML/M2
LEFT INTERNAL DIMENSION IN SYSTOLE: 3 CM (ref 2.1–4)
LEFT VENTRICULAR INTERNAL DIMENSION IN DIASTOLE: 4.9 CM (ref 3.5–6)
LEFT VENTRICULAR POSTERIOR WALL IN END DIASTOLE: 0.7 CM
LEFT VENTRICULAR STROKE VOLUME: 75 ML
LVSV (TEICH): 75 ML
MCH RBC QN AUTO: 33.3 PG (ref 26.8–34.3)
MCHC RBC AUTO-ENTMCNC: 33.8 G/DL (ref 31.4–37.4)
MCV RBC AUTO: 99 FL (ref 82–98)
MV E'TISSUE VEL-SEP: 12 CM/S
MV PEAK A VEL: 0.7 M/S
MV PEAK E VEL: 73 CM/S
MV STENOSIS PRESSURE HALF TIME: 50 MS
MV VALVE AREA P 1/2 METHOD: 4.4
NONHDLC SERPL-MCNC: 135 MG/DL
P AXIS: 71 DEGREES
P AXIS: 77 DEGREES
PLATELET # BLD AUTO: 312 THOUSANDS/UL (ref 149–390)
PMV BLD AUTO: 8.7 FL (ref 8.9–12.7)
POTASSIUM SERPL-SCNC: 4.2 MMOL/L (ref 3.5–5.3)
PR INTERVAL: 188 MS
PR INTERVAL: 200 MS
PROTHROMBIN TIME: 12.6 SECONDS (ref 11.6–14.5)
QRS AXIS: 75 DEGREES
QRS AXIS: 86 DEGREES
QRSD INTERVAL: 140 MS
QRSD INTERVAL: 140 MS
QT INTERVAL: 402 MS
QT INTERVAL: 412 MS
QTC INTERVAL: 472 MS
QTC INTERVAL: 475 MS
RBC # BLD AUTO: 3.87 MILLION/UL (ref 3.81–5.12)
RIGHT ATRIAL 2D VOLUME: 16 ML
RIGHT ATRIUM AREA SYSTOLE A4C: 7.9 CM2
RIGHT VENTRICLE ID DIMENSION: 2.8 CM
SL CV LEFT ATRIUM LENGTH A2C: 4.9 CM
SL CV LV EF: 55
SL CV PED ECHO LEFT VENTRICLE DIASTOLIC VOLUME (MOD BIPLANE) 2D: 111 ML
SL CV PED ECHO LEFT VENTRICLE SYSTOLIC VOLUME (MOD BIPLANE) 2D: 36 ML
SODIUM SERPL-SCNC: 141 MMOL/L (ref 135–147)
T WAVE AXIS: 53 DEGREES
T WAVE AXIS: 55 DEGREES
TR MAX PG: 21 MMHG
TR PEAK VELOCITY: 2.3 M/S
TRICUSPID ANNULAR PLANE SYSTOLIC EXCURSION: 2.8 CM
TRICUSPID VALVE PEAK REGURGITATION VELOCITY: 2.31 M/S
TRIGL SERPL-MCNC: 126 MG/DL
TSH SERPL DL<=0.05 MIU/L-ACNC: 2.12 UIU/ML (ref 0.45–4.5)
VENTRICULAR RATE: 79 BPM
VENTRICULAR RATE: 84 BPM
WBC # BLD AUTO: 6.15 THOUSAND/UL (ref 4.31–10.16)

## 2024-07-21 PROCEDURE — 84484 ASSAY OF TROPONIN QUANT: CPT | Performed by: PHYSICIAN ASSISTANT

## 2024-07-21 PROCEDURE — 93010 ELECTROCARDIOGRAM REPORT: CPT | Performed by: INTERNAL MEDICINE

## 2024-07-21 PROCEDURE — 85730 THROMBOPLASTIN TIME PARTIAL: CPT | Performed by: STUDENT IN AN ORGANIZED HEALTH CARE EDUCATION/TRAINING PROGRAM

## 2024-07-21 PROCEDURE — 85027 COMPLETE CBC AUTOMATED: CPT | Performed by: NURSE PRACTITIONER

## 2024-07-21 PROCEDURE — 93306 TTE W/DOPPLER COMPLETE: CPT | Performed by: INTERNAL MEDICINE

## 2024-07-21 PROCEDURE — 99245 OFF/OP CONSLTJ NEW/EST HI 55: CPT | Performed by: INTERNAL MEDICINE

## 2024-07-21 PROCEDURE — 99232 SBSQ HOSP IP/OBS MODERATE 35: CPT

## 2024-07-21 PROCEDURE — 85610 PROTHROMBIN TIME: CPT | Performed by: NURSE PRACTITIONER

## 2024-07-21 PROCEDURE — 80048 BASIC METABOLIC PNL TOTAL CA: CPT | Performed by: FAMILY MEDICINE

## 2024-07-21 PROCEDURE — 93306 TTE W/DOPPLER COMPLETE: CPT

## 2024-07-21 PROCEDURE — 80061 LIPID PANEL: CPT | Performed by: FAMILY MEDICINE

## 2024-07-21 PROCEDURE — 85730 THROMBOPLASTIN TIME PARTIAL: CPT | Performed by: NURSE PRACTITIONER

## 2024-07-21 PROCEDURE — 84443 ASSAY THYROID STIM HORMONE: CPT | Performed by: FAMILY MEDICINE

## 2024-07-21 RX ORDER — HEPARIN SODIUM 1000 [USP'U]/ML
4000 INJECTION, SOLUTION INTRAVENOUS; SUBCUTANEOUS ONCE
Status: COMPLETED | OUTPATIENT
Start: 2024-07-21 | End: 2024-07-21

## 2024-07-21 RX ORDER — HEPARIN SODIUM 1000 [USP'U]/ML
4000 INJECTION, SOLUTION INTRAVENOUS; SUBCUTANEOUS EVERY 6 HOURS PRN
Status: DISCONTINUED | OUTPATIENT
Start: 2024-07-21 | End: 2024-07-23

## 2024-07-21 RX ORDER — HEPARIN SODIUM 1000 [USP'U]/ML
2000 INJECTION, SOLUTION INTRAVENOUS; SUBCUTANEOUS EVERY 6 HOURS PRN
Status: DISCONTINUED | OUTPATIENT
Start: 2024-07-21 | End: 2024-07-23

## 2024-07-21 RX ORDER — HEPARIN SODIUM 10000 [USP'U]/100ML
3-20 INJECTION, SOLUTION INTRAVENOUS
Status: DISCONTINUED | OUTPATIENT
Start: 2024-07-21 | End: 2024-07-23

## 2024-07-21 RX ADMIN — ENOXAPARIN SODIUM 40 MG: 40 INJECTION SUBCUTANEOUS at 08:10

## 2024-07-21 RX ADMIN — HEPARIN SODIUM 12 UNITS/KG/HR: 10000 INJECTION, SOLUTION INTRAVENOUS at 10:27

## 2024-07-21 RX ADMIN — ASPIRIN 81 MG: 81 TABLET, CHEWABLE ORAL at 08:10

## 2024-07-21 RX ADMIN — FLUTICASONE FUROATE AND VILANTEROL TRIFENATATE 1 PUFF: 200; 25 POWDER RESPIRATORY (INHALATION) at 08:09

## 2024-07-21 RX ADMIN — DIVALPROEX SODIUM 500 MG: 500 TABLET, FILM COATED, EXTENDED RELEASE ORAL at 08:10

## 2024-07-21 RX ADMIN — ATORVASTATIN CALCIUM 80 MG: 40 TABLET, FILM COATED ORAL at 17:49

## 2024-07-21 RX ADMIN — HEPARIN SODIUM 14 UNITS/KG/HR: 10000 INJECTION, SOLUTION INTRAVENOUS at 23:39

## 2024-07-21 RX ADMIN — DICYCLOMINE HYDROCHLORIDE 20 MG: 20 TABLET ORAL at 20:52

## 2024-07-21 RX ADMIN — DICYCLOMINE HYDROCHLORIDE 20 MG: 20 TABLET ORAL at 14:45

## 2024-07-21 RX ADMIN — LIDOCAINE 1 PATCH: 50 PATCH CUTANEOUS at 08:10

## 2024-07-21 RX ADMIN — DIVALPROEX SODIUM 500 MG: 500 TABLET, FILM COATED, EXTENDED RELEASE ORAL at 17:49

## 2024-07-21 RX ADMIN — DICYCLOMINE HYDROCHLORIDE 20 MG: 20 TABLET ORAL at 08:15

## 2024-07-21 RX ADMIN — DICYCLOMINE HYDROCHLORIDE 20 MG: 20 TABLET ORAL at 02:53

## 2024-07-21 RX ADMIN — HEPARIN SODIUM 2000 UNITS: 1000 INJECTION INTRAVENOUS; SUBCUTANEOUS at 23:38

## 2024-07-21 RX ADMIN — ACETAMINOPHEN 975 MG: 325 TABLET, FILM COATED ORAL at 14:45

## 2024-07-21 RX ADMIN — ACETAMINOPHEN 975 MG: 325 TABLET, FILM COATED ORAL at 06:24

## 2024-07-21 RX ADMIN — ACETAMINOPHEN 975 MG: 325 TABLET, FILM COATED ORAL at 21:25

## 2024-07-21 RX ADMIN — HEPARIN SODIUM 4000 UNITS: 1000 INJECTION INTRAVENOUS; SUBCUTANEOUS at 10:29

## 2024-07-21 RX ADMIN — LORATADINE 10 MG: 10 TABLET ORAL at 08:10

## 2024-07-21 RX ADMIN — PANTOPRAZOLE SODIUM 20 MG: 20 TABLET, DELAYED RELEASE ORAL at 06:24

## 2024-07-21 NOTE — UTILIZATION REVIEW
Initial Clinical Review    Admission: Date/Time/Statement:   Admission Orders (From admission, onward)       Ordered        07/20/24 1432  Place in Observation  Once                          Orders Placed This Encounter   Procedures    Place in Observation     Standing Status:   Standing     Number of Occurrences:   1     Order Specific Question:   Level of Care     Answer:   Med Surg [16]     ED Arrival Information       Expected   -    Arrival   7/20/2024 09:51    Acuity   Urgent              Means of arrival   Walk-In    Escorted by   Self    Service   Hospitalist    Admission type   Emergency              Arrival complaint   abdominal pain             Chief Complaint   Patient presents with    Abdominal Pain     Patient co LUQ abdominal pain that radiates to left back. Denies N/V. Symptoms started approx 1 month ago.        Initial Presentation: 54 y.o. female presented to ED as observation for chest pain. PMH of bipolar disorder, reflux who presents with pain.  Patient states that chest pain has been going on for couple weeks.  Is present kind of all the time.  Sometimes when she rests it gets a little better.  She gets intermittent shortness of breath.  Does not recall any IV injury or lifting.  Patient states the pain is kind of present all the time.  Once in a while she does experience palpitations.  Difficulty in describing the pain she states sometimes is sharp sometimes dull sometimes an ache.  It goes from below her left breast to the side of the breast to the mid back.  No urinary complaints..  Patient also feels very fatigued all the time. Exam benign.  Plan telemetry, ST segment monitoring SCD,ECHO,Monitor cardiac enzymes and supportive care     Anticipated Length of Stay/Certification Statement: Patient will be admitted on an observation basis with an anticipated length of stay of less than 2 midnights secondary to chest pain monitoring       ED Triage Vitals   Temperature Pulse Respirations Blood  Pressure SpO2 Pain Score   07/20/24 1002 07/20/24 1002 07/20/24 1002 07/20/24 1002 07/20/24 1002 07/20/24 1125   98.8 °F (37.1 °C) (!) 110 18 167/85 98 % 7     Weight (last 2 days)       Date/Time Weight    07/20/24 1540 72.6 (160)     Weight: kg at 07/20/24 1540    07/20/24 1002 72.7 (160.27)            Vital Signs (last 3 days)       Date/Time Temp Pulse Resp BP MAP (mmHg) SpO2 O2 Device Patient Position - Orthostatic VS Pain    07/21/24 07:56:34 98.1 °F (36.7 °C) 78 -- 113/70 84 95 % -- -- --    07/21/24 0624 -- -- -- -- -- -- -- -- 6 07/21/24 02:56:57 97.3 °F (36.3 °C) 76 16 110/64 79 97 % -- -- --    07/20/24 2335 -- -- -- -- -- -- -- -- 6 07/20/24 21:27:56 97 °F (36.1 °C) 88 -- 108/61 77 95 % -- -- --    07/20/24 19:20:02 96.9 °F (36.1 °C) 87 -- 120/87 98 97 % -- -- --    07/20/24 1901 -- -- -- -- -- -- -- -- No Pain    07/20/24 1700 98.9 °F (37.2 °C) -- -- -- -- 97 % None (Room air) -- No Pain    07/20/24 1601 -- -- -- -- -- -- -- -- 7    07/20/24 1540 98.9 °F (37.2 °C) 92 16 155/94 -- -- -- -- 7 07/20/24 15:11:56 96.5 °F (35.8 °C) 92 -- 155/94 114 97 % -- -- --    07/20/24 1230 -- 95 13 125/81 98 99 % None (Room air) -- --    07/20/24 1200 -- 100 19 155/68 98 98 % None (Room air) Sitting --    07/20/24 1125 -- -- -- -- -- -- -- -- 7 07/20/24 1002 98.8 °F (37.1 °C) 110 18 167/85 -- 98 % None (Room air) Sitting --              Pertinent Labs/Diagnostic Test Results:   Radiology:  XR chest 2 views   Final Interpretation by Clara Avery MD (07/20 1124)      No acute cardiopulmonary disease.               Workstation performed: [id number]           Cardiology:  ECG 12 lead    by Interface, Ris Results In (07/20 2131)      ECG 12 lead    by Interface, Ris Results In (07/20 1924)      ECG 12 lead   Final Result by Libia Barragan MD (07/20 1644)   Normal sinus rhythm   Right bundle branch block   Abnormal ECG   Confirmed by Libia Barragan ([id number]) on 7/20/2024 4:44:36 PM      ECG 12 lead    Final Result by Libia Barragan MD (07/20 1645)   Normal sinus rhythm   Right bundle branch block   Abnormal ECG      Confirmed by Libia Barragan ([id number]) on 7/20/2024 4:45:28 PM      ECG 12 lead   Final Result by Libia Barragan MD (07/20 1646)   Sinus tachycardia   Right bundle branch block   Abnormal ECG   Confirmed by Libia Barragan ([id number]) on 7/20/2024 4:46:47 PM        GI:  No orders to display           Results from last 7 days   Lab Units 07/20/24  1631 07/20/24  1056   WBC Thousand/uL  --  8.07   HEMOGLOBIN g/dL  --  13.3   HEMATOCRIT %  --  38.9   PLATELETS Thousands/uL 319 321   TOTAL NEUT ABS Thousands/µL  --  5.34         Results from last 7 days   Lab Units 07/21/24  0602 07/20/24  1056   SODIUM mmol/L 141 142   POTASSIUM mmol/L 4.2 3.8   CHLORIDE mmol/L 107 107   CO2 mmol/L 28 29   ANION GAP mmol/L 6 6   BUN mg/dL 15 23   CREATININE mg/dL 0.68 0.81   EGFR ml/min/1.73sq m 100 83   CALCIUM mg/dL 8.5 8.9     Results from last 7 days   Lab Units 07/20/24  1056   AST U/L 12*   ALT U/L 9   ALK PHOS U/L 69   TOTAL PROTEIN g/dL 6.5   ALBUMIN g/dL 4.0   TOTAL BILIRUBIN mg/dL 0.25         Results from last 7 days   Lab Units 07/21/24  0602 07/20/24  1056   GLUCOSE RANDOM mg/dL 102 100       Results from last 7 days   Lab Units 07/21/24  0250 07/20/24  2342 07/20/24  2138 07/20/24  1930 07/20/24  1631 07/20/24  1239 07/20/24  1056   HS TNI 0HR ng/L  --  229*  --   --  156*  --  10   HS TNI 2HR ng/L 189*  --   --  206*  --  30  --    HSTNI D2 ng/L -40  --   --  50*  --  20*  --    HS TNI 4HR ng/L  --   --  229*  --  150*  --   --    HSTNI D4 ng/L  --   --  73*  --  -6  --   --      Results from last 7 days   Lab Units 07/20/24  1056   D-DIMER QUANTITATIVE ug/ml FEU 0.28         Results from last 7 days   Lab Units 07/21/24  0602   TSH 3RD GENERATON uIU/mL 2.120     Results from last 7 days   Lab Units 07/20/24  1056   LIPASE u/L 10*       ED Treatment-Medication Administration from 07/20/2024 0951 to  07/20/2024 1505         Date/Time Order Dose Route Action     07/20/2024 1055 sodium chloride 0.9 % bolus 1,000 mL 1,000 mL Intravenous New Bag     07/20/2024 1055 aspirin tablet 325 mg 325 mg Oral Given     07/20/2024 1201 lidocaine (LIDODERM) 5 % patch 1 patch 1 patch Topical Medication Applied     07/20/2024 1201 methocarbamol (ROBAXIN) tablet 1,000 mg 1,000 mg Oral Given            Past Medical History:   Diagnosis Date    GERD (gastroesophageal reflux disease)     Psychiatric disorder      Present on Admission:   Gastroesophageal reflux disease      Admitting Diagnosis: Chest pain [R07.9]  Chest pain, unspecified type [R07.9]  Age/Sex: 54 y.o. female  Admission Orders:  Scheduled Medications:  acetaminophen, 975 mg, Oral, Q8H JOANNA  aspirin, 81 mg, Oral, Daily  atorvastatin, 80 mg, Oral, QPM  dicyclomine, 20 mg, Oral, Q6H  divalproex sodium, 500 mg, Oral, BID  enoxaparin, 40 mg, Subcutaneous, Daily  fluticasone-vilanterol, 1 puff, Inhalation, Daily  lidocaine, 1 patch, Topical, Daily  loratadine, 10 mg, Oral, Daily  nicotine, 1 patch, Transdermal, Daily  pantoprazole, 20 mg, Oral, Daily Before Breakfast      Continuous IV Infusions:     PRN Meds:  acetaminophen, 650 mg, Oral, Q6H PRN  albuterol, 2 puff, Inhalation, Q6H PRN  ondansetron, 4 mg, Intravenous, Q6H PRN        IP CONSULT TO CARDIOLOGY    Network Utilization Review Department  ATTENTION: Please call with any questions or concerns to [phone] and carefully listen to the prompts so that you are directed to the right person. All voicemails are confidential.   For Discharge needs, contact Care Management DC Support Team at [phone] opt. [phone]  Send all requests for admission clinical reviews, approved or denied determinations and any other requests to dedicated fax number below belonging to the campus where the patient is receiving treatment. List of dedicated fax numbers for the Facilities:  FACILITY NAME UR FAX NUMBER   ADMISSION DENIALS  (Administrative/Medical Necessity) [phone]   DISCHARGE SUPPORT TEAM (NETWORK) [phone]   PARENT CHILD HEALTH (Maternity/NICU/Pediatrics) [phone]   [facility] [phone]   [facility] [phone]   [facility] [phone]   [facility] [phone]   [facility] [phone]   [facility] [phone]   [facility] [phone]   [facility] [phone]   [facility] [phone]   [facility] [phone]   [facility] [phone]   [facility] [phone]

## 2024-07-21 NOTE — PLAN OF CARE
Problem: PAIN - ADULT  Goal: Verbalizes/displays adequate comfort level or baseline comfort level  Description: Interventions:  - Encourage patient to monitor pain and request assistance  - Assess pain using appropriate pain scale  - Administer analgesics based on type and severity of pain and evaluate response  - Implement non-pharmacological measures as appropriate and evaluate response  - Consider cultural and social influences on pain and pain management  - Notify physician/advanced practitioner if interventions unsuccessful or patient reports new pain  Outcome: Progressing     Problem: INFECTION - ADULT  Goal: Absence or prevention of progression during hospitalization  Description: INTERVENTIONS:  - Assess and monitor for signs and symptoms of infection  - Monitor lab/diagnostic results  - Monitor all insertion sites, i.e. indwelling lines, tubes, and drains  - Monitor endotracheal if appropriate and nasal secretions for changes in amount and color  - [place] appropriate cooling/warming therapies per order  - Administer medications as ordered  - Instruct and encourage patient and family to use good hand hygiene technique  - Identify and instruct in appropriate isolation precautions for identified infection/condition  Outcome: Progressing

## 2024-07-21 NOTE — CONSULTS
Consultation - Cardiology   Renee Peterson 54 y.o. female MRN: [id number]  Unit/Bed#: -01 Encounter: [id number]    Inpatient consult to Cardiology  Consult performed by: NAGI Garcia  Consult ordered by: Rodrigo Kamara PA-C          Physician Requesting Consult: Osvaldo Munoz MD  Reason for Consult / Principal Problem: Chest pain and elevated troponin    Assessment/Plan:    Chest pain   -Patient reports ongoing for months, associated with shortness of breath at times   -High-sensitivity troponin elevated, risk factors include tobacco abuse, family history of MI, elevated troponin  -Recommend patient have ischemic evaluation, discussed stress testing versus cardiac catheterization and patient would like to proceed with cardiac catheterization at this time  -TTE ordered and pending   -Continue to monitor on telemetry    2. Elevated troponin  -Elevated with peak of 229  -Start IV heparin drip  - Continue aspirin, atorvastatin   -See plan as above    3. Hyperlipidemia  -Continue atorvastatin    4.  Tobacco abuse  - Cessation encouraged    HPI: Cardiologist Dr. Barragan    Renee Kristen is a 54 y.o. year old female who has a history of bipolar, GERD, tobacco abuse, hyperlipidemia who presented to the emergency room with few months of chest pain occasionally associated with shortness of breath.  She also experience palpitations at times.  She also reports that her symptoms are not always associated with activity and can occur at rest.  She also notes a significant family history of mother dying from an MI and her sister has some sort of heart issues but she is unsure what.      In the emergency room her high-sensitivity troponin was noted to be elevated with a peak at 229 and has since trended down.  It was discussed with the patient and her friend her risk factors and elevated troponin and the need for ischemic evaluation.  She was with presented with stress testing and cardiac catheterization and  patient decided to proceed with cardiac catheterization at this time.    Family history: mother MI, sister heart issues      Social history: tobacco use- 1ppd     REVIEW OF SYSTEMS:  Constitutional:  Denies fever or chills   Eyes:  Denies change in visual acuity   HENT:  Denies nasal congestion or sore throat   Respiratory:  Denies cough, orthopnea, PND or shortness of breath   Cardiovascular: + Chest pain, palpitations, denies edema   GI:  Denies abdominal pain, nausea, vomiting, bloody stools or diarrhea   :  Denies dysuria, frequency, difficulty in micturition and nocturia  Musculoskeletal:  Denies back pain or joint pain   Neurologic:  Denies headache, focal weakness or sensory changes   Endocrine:  Denies polyuria or polydipsia   Lymphatic:  Denies swollen glands   Psychiatric:  Denies depression or anxiety     Historical Information   Past Medical History:   Diagnosis Date    GERD (gastroesophageal reflux disease)     Psychiatric disorder      Past Surgical History:   Procedure Laterality Date    CHOLECYSTECTOMY      TUBAL LIGATION       Social History     Substance and Sexual Activity   Alcohol Use No     Social History     Substance and Sexual Activity   Drug Use No     Social History     Tobacco Use   Smoking Status Every Day    Current packs/day: 1.00    Types: Cigarettes   Smokeless Tobacco Never       Family History: History reviewed. No pertinent family history.    MEDS & ALLERGIES:  all current active meds have been reviewed and current meds:   Current Facility-Administered Medications   Medication Dose Route Frequency    acetaminophen (TYLENOL) tablet 650 mg  650 mg Oral Q6H PRN    acetaminophen (TYLENOL) tablet 975 mg  975 mg Oral Q8H JOANNA    albuterol (PROVENTIL HFA,VENTOLIN HFA) inhaler 2 puff  2 puff Inhalation Q6H PRN    aspirin chewable tablet 81 mg  81 mg Oral Daily    atorvastatin (LIPITOR) tablet 80 mg  80 mg Oral QPM    dicyclomine (BENTYL) tablet 20 mg  20 mg Oral Q6H    divalproex sodium  (DEPAKOTE ER) 24 hr tablet 500 mg  500 mg Oral BID    enoxaparin (LOVENOX) subcutaneous injection 40 mg  40 mg Subcutaneous Daily    fluticasone-vilanterol 200-25 mcg/actuation 1 puff  1 puff Inhalation Daily    lidocaine (LIDODERM) 5 % patch 1 patch  1 patch Topical Daily    loratadine (CLARITIN) tablet 10 mg  10 mg Oral Daily    nicotine (NICODERM CQ) 21 mg/24 hr TD 24 hr patch 1 patch  1 patch Transdermal Daily    ondansetron (ZOFRAN) injection 4 mg  4 mg Intravenous Q6H PRN    pantoprazole (PROTONIX) EC tablet 20 mg  20 mg Oral Daily Before Breakfast        Allergies   Allergen Reactions    Bee Venom     Beeswax     Penicillins      Pt unsure as to what happens        OBJECTIVE:  Vitals:   Vitals:    24 0756   BP: 113/70   Pulse: 78   Resp:    Temp: 98.1 °F (36.7 °C)   SpO2: 95%     Body mass index is 25.06 kg/m².    Systolic (24hrs), Av , Min:108 , Max:167     Diastolic (24hrs), Av, Min:61, Max:94    No intake or output data in the 24 hours ending 24 0942  Weight (last 2 days)       Date/Time Weight    24 1540 72.6 (160)     Weight: kg at 24 1540    24 1002 72.7 (160.27)          Invasive Devices       Peripheral Intravenous Line  Duration             Peripheral IV 20 Left Hand 1414 days    Peripheral IV 24 Right Antecubital <1 day                    PHYSICAL EXAMS:  General:  Patient is not in acute distress, laying in the bed comfortably, awake, alert   Head: Normocephalic, Atraumatic.   HEENT: White sclera, pink conjunctiva  Neck:  Supple, no neck vein distention  Respiratory: clear to auscultation   Cardiovascular:  PMI normal, S1-S2 normal, no murmurs, thrills, gallops, rubs, regular rhythm  GI:  Abdomen soft, non-tender, non-distended  Extremities: No edema, normal pulses  Integument:  No skin rashes or ulceration  Lymphatic:  No cervical or inguinal lymphadenopathy  Neurologic:  Patient is awake alert, responding to command, oriented to person, place and  "time     LABORATORY RESULTS:      CBC with diff:   Results from last 7 days   Lab Units 07/20/24  1631 07/20/24  1056   WBC Thousand/uL  --  8.07   HEMOGLOBIN g/dL  --  13.3   HEMATOCRIT %  --  38.9   MCV fL  --  98   PLATELETS Thousands/uL 319 321   RBC Million/uL  --  3.96   MCH pg  --  33.6   MCHC g/dL  --  34.2   RDW %  --  12.4   MPV fL 8.8* 8.8*   NRBC AUTO /100 WBCs  --  0       CMP:  Results from last 7 days   Lab Units 07/21/24  0602 07/20/24  1056   POTASSIUM mmol/L 4.2 3.8   CHLORIDE mmol/L 107 107   CO2 mmol/L 28 29   BUN mg/dL 15 23   CREATININE mg/dL 0.68 0.81   CALCIUM mg/dL 8.5 8.9   AST U/L  --  12*   ALT U/L  --  9   ALK PHOS U/L  --  69   EGFR ml/min/1.73sq m 100 83       BMP:  Results from last 7 days   Lab Units 07/21/24  0602 07/20/24  1056   POTASSIUM mmol/L 4.2 3.8   CHLORIDE mmol/L 107 107   CO2 mmol/L 28 29   BUN mg/dL 15 23   CREATININE mg/dL 0.68 0.81   CALCIUM mg/dL 8.5 8.9                    Results from last 7 days   Lab Units 07/21/24  0602   TSH 3RD GENERATON uIU/mL 2.120           Lipid Profile:   No results found for: \"CHOL\"  Lab Results   Component Value Date    HDL 46 (L) 07/21/2024     Lab Results   Component Value Date    LDLCALC 110 (H) 07/21/2024     Lab Results   Component Value Date    TRIG 126 07/21/2024       Cardiac testing:   No results found for this or any previous visit.    No results found for this or any previous visit.    No valid procedures specified.  No results found for this or any previous visit.        Imaging:   I have personally reviewed pertinent reports.        EKG reviewed personally:  NSR with R BBB    Code Status: Level 1 - Full Code      NAGI Garcia  7/21/2024,9:42 AM      "

## 2024-07-21 NOTE — ASSESSMENT & PLAN NOTE
Reports intermittent chest pain with any exertion over the past several weeks  Troponin peaked at 229, most recent 189  Lipid panel showed total Lustral 181, triglyceride 126, HDL 46,   NAVEED score 0  Obtain echocardiogram  Given HPI consulted cardiology cardiology recommending  Further ischemic workup, patient currently agreeable to cardiac catheterization, will be scheduled for tomorrow?

## 2024-07-21 NOTE — PROGRESS NOTES
[facility]  Progress Note  Name: Renee Peterson I  MRN: [id number]  Unit/Bed#: -01 I Date of Admission: 7/20/2024   Date of Service: 7/21/2024 I Hospital Day: 0    Assessment & Plan   * Chest pain  Assessment & Plan  Reports intermittent chest pain with any exertion over the past several weeks  Troponin peaked at 229, most recent 189  Lipid panel showed total Lustral 181, triglyceride 126, HDL 46,   NAVEED score 0  Obtain echocardiogram  Given HPI consulted cardiology cardiology recommending  Further ischemic workup, patient currently agreeable to cardiac catheterization, will be scheduled for tomorrow?    Tobacco abuse  Assessment & Plan  Encourage cessation    Fatigue  Assessment & Plan  TSH 2.120, had thyroid procedure years ago, continue follow-up outpatient      Psychiatric diagnosis  Assessment & Plan  Patient has a history of bipolar.  Continue Depakote    Gastroesophageal reflux disease  Assessment & Plan  Continue PPI             VTE Pharmacologic Prophylaxis: VTE Score: 4 Moderate Risk (Score 3-4) - Pharmacological DVT Prophylaxis Ordered: heparin drip.    Mobility:   Basic Mobility Inpatient Raw Score: 23  JH-HLM Goal: 7: Walk 25 feet or more  JH-HLM Achieved: 7: Walk 25 feet or more  JH-HLM Goal achieved. Continue to encourage appropriate mobility.    Patient Centered Rounds: I performed bedside rounds with nursing staff today.   Discussions with Specialists or Other Care Team Provider: CM, cardiology    Education and Discussions with Family / Patient: Updated  (father) at bedside.    Total Time Spent on Date of Encounter in care of patient: 35 mins. This time was spent on one or more of the following: performing physical exam; counseling and coordination of care; obtaining or reviewing history; documenting in the medical record; reviewing/ordering tests, medications or procedures; communicating with other healthcare professionals and discussing with  patient's family/caregivers.    Current Length of Stay: 0 day(s)  Current Patient Status: Observation   Certification Statement: The patient will continue to require additional inpatient hospital stay due to continued ischemic workup  Discharge Plan: Anticipate discharge in 24-48 hrs to home.    Code Status: Level 1 - Full Code    Subjective:   Patient reports not currently having chest pain.  Currently denies any chest pain/pressure, palpitations, lightness, nausea, shortness of breath or chills.    Objective:     Vitals:   Temp (24hrs), Av.7 °F (36.5 °C), Min:96.5 °F (35.8 °C), Max:98.9 °F (37.2 °C)    Temp:  [96.5 °F (35.8 °C)-98.9 °F (37.2 °C)] 97.9 °F (36.6 °C)  HR:  [76-95] 85  Resp:  [13-16] 16  BP: (108-155)/(61-94) 117/72  SpO2:  [95 %-99 %] 96 %  Body mass index is 25.06 kg/m².     Input and Output Summary (last 24 hours):   No intake or output data in the 24 hours ending 24 1217    Physical Exam:   Physical Exam  Vitals and nursing note reviewed.   Constitutional:       Appearance: She is normal weight.   HENT:      Head: Normocephalic.      Nose: Nose normal.      Mouth/Throat:      Mouth: Mucous membranes are moist.      Pharynx: Oropharynx is clear.   Eyes:      General: No scleral icterus.     Conjunctiva/sclera: Conjunctivae normal.      Pupils: Pupils are equal, round, and reactive to light.   Cardiovascular:      Rate and Rhythm: Normal rate and regular rhythm.      Heart sounds: No murmur heard.     No friction rub. No gallop.   Pulmonary:      Effort: Pulmonary effort is normal. No respiratory distress.      Breath sounds: Normal breath sounds. No stridor. No wheezing, rhonchi or rales.   Abdominal:      General: Abdomen is flat.      Palpations: Abdomen is soft.   Musculoskeletal:         General: Normal range of motion.      Cervical back: Normal range of motion and neck supple.      Right lower leg: No edema.      Left lower leg: No edema.   Lymphadenopathy:      Cervical: No  cervical adenopathy.   Skin:     General: Skin is warm.      Coloration: Skin is not jaundiced or pale.      Findings: No bruising, erythema or lesion.   Neurological:      General: No focal deficit present.      Mental Status: She is alert and oriented to person, place, and time. Mental status is at baseline.      Cranial Nerves: No cranial nerve deficit.      Motor: No weakness.   Psychiatric:         Mood and Affect: Mood normal.         Behavior: Behavior normal.         Thought Content: Thought content normal.          Additional Data:     Labs:  Results from last 7 days   Lab Units 07/21/24  1033 07/20/24  1631 07/20/24  1056   WBC Thousand/uL 6.15  --  8.07   HEMOGLOBIN g/dL 12.9  --  13.3   HEMATOCRIT % 38.2  --  38.9   PLATELETS Thousands/uL 312   < > 321   SEGS PCT %  --   --  65   LYMPHO PCT %  --   --  22   MONO PCT %  --   --  11   EOS PCT %  --   --  1    < > = values in this interval not displayed.     Results from last 7 days   Lab Units 07/21/24  0602 07/20/24  1056   SODIUM mmol/L 141 142   POTASSIUM mmol/L 4.2 3.8   CHLORIDE mmol/L 107 107   CO2 mmol/L 28 29   BUN mg/dL 15 23   CREATININE mg/dL 0.68 0.81   ANION GAP mmol/L 6 6   CALCIUM mg/dL 8.5 8.9   ALBUMIN g/dL  --  4.0   TOTAL BILIRUBIN mg/dL  --  0.25   ALK PHOS U/L  --  69   ALT U/L  --  9   AST U/L  --  12*   GLUCOSE RANDOM mg/dL 102 100     Results from last 7 days   Lab Units 07/21/24  1033   INR  0.88                   Lines/Drains:  Invasive Devices       Peripheral Intravenous Line  Duration             Peripheral IV 09/06/20 Left Hand 1414 days    Peripheral IV 07/20/24 Right Antecubital 1 day                      Telemetry:  Telemetry Orders (From admission, onward)               24 Hour Telemetry Monitoring  Continuous x 24 Hours (Telem)        Question:  Reason for 24 Hour Telemetry  Answer:  Patients with cj/jon/endocarditis; cardiac contusion                     Telemetry Reviewed: Normal Sinus Rhythm  Indication for Continued  Telemetry Use: Acute MI/Unstable Angina/Rule out ACS             Imaging: No pertinent imaging reviewed.    Recent Cultures (last 7 days):         Last 24 Hours Medication List:   Current Facility-Administered Medications   Medication Dose Route Frequency Provider Last Rate    acetaminophen  650 mg Oral Q6H PRN Taco Dubois MD      acetaminophen  975 mg Oral Q8H JOANNA Taco Dubois MD      albuterol  2 puff Inhalation Q6H PRN Taco Dubois MD      aspirin  81 mg Oral Daily Taco Dubois MD      atorvastatin  80 mg Oral QPM Taco Dubois MD      dicyclomine  20 mg Oral Q6H Taco Dubois MD      divalproex sodium  500 mg Oral BID Taco Dubois MD      fluticasone-vilanterol  1 puff Inhalation Daily Taco Dubois MD      heparin (porcine)  3-20 Units/kg/hr (Order-Specific) Intravenous Titrated Sarahi A Poole, CRNP 12 Units/kg/hr (07/21/24 1027)    heparin (porcine)  2,000 Units Intravenous Q6H PRN Sarahi A Poole, CRNP      heparin (porcine)  4,000 Units Intravenous Q6H PRN Sarahi A Opole, CRNP      lidocaine  1 patch Topical Daily Taco Dubois MD      loratadine  10 mg Oral Daily Taco Dubois MD      nicotine  1 patch Transdermal Daily Taco Dubois MD      ondansetron  4 mg Intravenous Q6H PRN Taco Dubois MD      pantoprazole  20 mg Oral Daily Before Breakfast Taco Dubois MD          Today, Patient Was Seen By: Rodrigo Kamara PA-C    **Please Note: This note may have been constructed using a voice recognition system.**

## 2024-07-21 NOTE — PLAN OF CARE
Problem: INFECTION - ADULT  Goal: Absence or prevention of progression during hospitalization  Description: INTERVENTIONS:  - Assess and monitor for signs and symptoms of infection  - Monitor lab/diagnostic results  - Monitor all insertion sites, i.e. indwelling lines, tubes, and drains  - Monitor endotracheal if appropriate and nasal secretions for changes in amount and color  - [place] appropriate cooling/warming therapies per order  - Administer medications as ordered  - Instruct and encourage patient and family to use good hand hygiene technique  - Identify and instruct in appropriate isolation precautions for identified infection/condition  Outcome: Progressing

## 2024-07-22 LAB
ANION GAP SERPL CALCULATED.3IONS-SCNC: 6 MMOL/L (ref 4–13)
APTT PPP: 56 SECONDS (ref 23–37)
APTT PPP: 70 SECONDS (ref 23–37)
APTT PPP: 74 SECONDS (ref 23–37)
BUN SERPL-MCNC: 20 MG/DL (ref 5–25)
CALCIUM SERPL-MCNC: 8.4 MG/DL (ref 8.4–10.2)
CHLORIDE SERPL-SCNC: 106 MMOL/L (ref 96–108)
CO2 SERPL-SCNC: 29 MMOL/L (ref 21–32)
CREAT SERPL-MCNC: 0.67 MG/DL (ref 0.6–1.3)
ERYTHROCYTE [DISTWIDTH] IN BLOOD BY AUTOMATED COUNT: 12.4 % (ref 11.6–15.1)
GFR SERPL CREATININE-BSD FRML MDRD: 101 ML/MIN/1.73SQ M
GLUCOSE P FAST SERPL-MCNC: 101 MG/DL (ref 65–99)
GLUCOSE SERPL-MCNC: 101 MG/DL (ref 65–140)
HCT VFR BLD AUTO: 36.8 % (ref 34.8–46.1)
HGB BLD-MCNC: 12.3 G/DL (ref 11.5–15.4)
MAGNESIUM SERPL-MCNC: 2.1 MG/DL (ref 1.9–2.7)
MCH RBC QN AUTO: 32.8 PG (ref 26.8–34.3)
MCHC RBC AUTO-ENTMCNC: 33.4 G/DL (ref 31.4–37.4)
MCV RBC AUTO: 98 FL (ref 82–98)
PLATELET # BLD AUTO: 281 THOUSANDS/UL (ref 149–390)
PMV BLD AUTO: 9.1 FL (ref 8.9–12.7)
POTASSIUM SERPL-SCNC: 3.9 MMOL/L (ref 3.5–5.3)
RBC # BLD AUTO: 3.75 MILLION/UL (ref 3.81–5.12)
SODIUM SERPL-SCNC: 141 MMOL/L (ref 135–147)
WBC # BLD AUTO: 5.69 THOUSAND/UL (ref 4.31–10.16)

## 2024-07-22 PROCEDURE — 99232 SBSQ HOSP IP/OBS MODERATE 35: CPT

## 2024-07-22 PROCEDURE — 85027 COMPLETE CBC AUTOMATED: CPT

## 2024-07-22 PROCEDURE — 85730 THROMBOPLASTIN TIME PARTIAL: CPT | Performed by: INTERNAL MEDICINE

## 2024-07-22 PROCEDURE — 85730 THROMBOPLASTIN TIME PARTIAL: CPT | Performed by: STUDENT IN AN ORGANIZED HEALTH CARE EDUCATION/TRAINING PROGRAM

## 2024-07-22 PROCEDURE — 80048 BASIC METABOLIC PNL TOTAL CA: CPT

## 2024-07-22 PROCEDURE — 83735 ASSAY OF MAGNESIUM: CPT | Performed by: NURSE PRACTITIONER

## 2024-07-22 RX ADMIN — ACETAMINOPHEN 975 MG: 325 TABLET, FILM COATED ORAL at 22:19

## 2024-07-22 RX ADMIN — DICYCLOMINE HYDROCHLORIDE 20 MG: 20 TABLET ORAL at 05:49

## 2024-07-22 RX ADMIN — LIDOCAINE 1 PATCH: 50 PATCH CUTANEOUS at 08:55

## 2024-07-22 RX ADMIN — HEPARIN SODIUM 16 UNITS/KG/HR: 10000 INJECTION, SOLUTION INTRAVENOUS at 12:10

## 2024-07-22 RX ADMIN — ACETAMINOPHEN 975 MG: 325 TABLET, FILM COATED ORAL at 14:18

## 2024-07-22 RX ADMIN — ATORVASTATIN CALCIUM 80 MG: 40 TABLET, FILM COATED ORAL at 16:50

## 2024-07-22 RX ADMIN — HEPARIN SODIUM 14 UNITS/KG/HR: 10000 INJECTION, SOLUTION INTRAVENOUS at 10:45

## 2024-07-22 RX ADMIN — DICYCLOMINE HYDROCHLORIDE 20 MG: 20 TABLET ORAL at 08:56

## 2024-07-22 RX ADMIN — NICOTINE 1 PATCH: 21 PATCH, EXTENDED RELEASE TRANSDERMAL at 08:54

## 2024-07-22 RX ADMIN — ASPIRIN 81 MG: 81 TABLET, CHEWABLE ORAL at 08:56

## 2024-07-22 RX ADMIN — DIVALPROEX SODIUM 500 MG: 500 TABLET, FILM COATED, EXTENDED RELEASE ORAL at 08:56

## 2024-07-22 RX ADMIN — DICYCLOMINE HYDROCHLORIDE 20 MG: 20 TABLET ORAL at 22:19

## 2024-07-22 RX ADMIN — HEPARIN SODIUM 2000 UNITS: 1000 INJECTION INTRAVENOUS; SUBCUTANEOUS at 12:07

## 2024-07-22 RX ADMIN — DIVALPROEX SODIUM 500 MG: 500 TABLET, FILM COATED, EXTENDED RELEASE ORAL at 16:50

## 2024-07-22 RX ADMIN — DICYCLOMINE HYDROCHLORIDE 20 MG: 20 TABLET ORAL at 14:18

## 2024-07-22 RX ADMIN — ACETAMINOPHEN 975 MG: 325 TABLET, FILM COATED ORAL at 05:48

## 2024-07-22 RX ADMIN — PANTOPRAZOLE SODIUM 20 MG: 20 TABLET, DELAYED RELEASE ORAL at 05:48

## 2024-07-22 RX ADMIN — LORATADINE 10 MG: 10 TABLET ORAL at 08:56

## 2024-07-22 RX ADMIN — FLUTICASONE FUROATE AND VILANTEROL TRIFENATATE 1 PUFF: 200; 25 POWDER RESPIRATORY (INHALATION) at 08:57

## 2024-07-22 NOTE — UTILIZATION REVIEW
Continued Stay Review    Date: 7/22  and  7/23                    Current Patient Class: IP   Current Level of Care: tele     HPI:54 y.o. female initially admitted on  7/20    Assessment/Plan:     7/23 IM Note   Cardiac catheterization conducted which showed no coronary artery disease, chest pain likely noncardiac origin. DC to home .       7/22 IM Note   Troponin peaked at 229, most recent 189 . EF 55 % . Cardiology following . GERD PPI . DC anticipated tomorrow .       Vital Signs (last 3 days)       Date/Time Temp Pulse Resp BP MAP (mmHg) SpO2 O2 Device Patient Position - Orthostatic VS Pain    07/23/24 1200 -- 90 18 123/77 92 97 % None (Room air) Sitting 8    07/23/24 1050 98 °F (36.7 °C) 84 18 114/72 86 -- None (Room air) Sitting No Pain    07/23/24 09:33:52 -- -- -- -- -- -- -- -- No Pain    07/23/24 08:56:47 -- -- -- -- -- -- -- -- No Pain    07/23/24 07:19:33 97.5 °F (36.4 °C) 82 18 108/65 79 98 % -- -- --    07/23/24 0626 -- -- -- -- -- -- -- -- Med Not Given for Pain - for MAR use only    07/23/24 00:06:30 97.3 °F (36.3 °C) 78 18 107/62 77 96 % None (Room air) Lying --    07/22/24 2219 -- -- -- -- -- -- -- -- Med Not Given for Pain - for MAR use only    07/22/24 1954 -- -- -- -- -- -- -- -- No Pain    07/22/24 19:15:42 97.8 °F (36.6 °C) 102 18 127/78 94 95 % -- -- --    07/22/24 15:48:17 -- 85 18 124/73 90 93 % -- -- --    07/22/24 1418 -- -- -- -- -- -- -- -- No Pain    07/22/24 1100 -- -- -- -- -- -- None (Room air) -- No Pain    07/22/24 10:57:03 97.7 °F (36.5 °C) 90 16 126/73 91 96 % -- -- --    07/22/24 08:48:27 97.5 °F (36.4 °C) 85 16 108/66 80 96 % -- -- --    07/22/24 0548 -- -- -- -- -- -- -- -- 2    07/22/24 03:07:57 97.3 °F (36.3 °C) 79 -- 104/57 73 97 % -- -- --    07/21/24 22:51:13 97.4 °F (36.3 °C) 79 -- 103/57 72 95 % -- -- --    07/21/24 2125 -- -- -- -- -- -- -- -- 4    07/21/24 19:48:48 97.9 °F (36.6 °C) 94 -- 125/72 90 97 % None (Room air) -- No Pain    07/21/24 15:43:48 97.7 °F (36.5  °C) 92 -- 133/73 93 95 % -- -- --    07/21/24 1445 -- -- -- -- -- -- None (Room air) -- 5    07/21/24 1400 -- 85 -- 117/72 -- -- -- -- --    07/21/24 11:09:51 97.9 °F (36.6 °C) 85 -- 117/72 87 96 % -- -- --    07/21/24 07:56:34 98.1 °F (36.7 °C) 78 -- 113/70 84 95 % -- -- --    07/21/24 0624 -- -- -- -- -- -- -- -- 6    07/21/24 02:56:57 97.3 °F (36.3 °C) 76 16 110/64 79 97 % -- -- --    07/20/24 2335 -- -- -- -- -- -- -- -- 6    07/20/24 21:27:56 97 °F (36.1 °C) 88 -- 108/61 77 95 % -- -- --    07/20/24 19:20:02 96.9 °F (36.1 °C) 87 -- 120/87 98 97 % -- -- --    07/20/24 1901 -- -- -- -- -- -- -- -- No Pain    07/20/24 1700 98.9 °F (37.2 °C) -- -- -- -- 97 % None (Room air) -- No Pain    07/20/24 1601 -- -- -- -- -- -- -- -- 7 07/20/24 1540 98.9 °F (37.2 °C) 92 16 155/94 -- -- -- -- 7 07/20/24 15:11:56 96.5 °F (35.8 °C) 92 -- 155/94 114 97 % -- -- --    07/20/24 1230 -- 95 13 125/81 98 99 % None (Room air) -- --    07/20/24 1200 -- 100 19 155/68 98 98 % None (Room air) Sitting --    07/20/24 1125 -- -- -- -- -- -- -- -- 7    07/20/24 1002 98.8 °F (37.1 °C) 110 18 167/85 -- 98 % None (Room air) Sitting --          Weight (last 2 days)       Date/Time Weight    07/21/24 1400 72.6 (160)              Pertinent Labs/Diagnostic Results:   Radiology:  XR chest 2 views   Final Interpretation by Clara Avery MD (07/20 1124)      No acute cardiopulmonary disease.               Workstation performed: [id number]           Cardiology:  Cardiac catheterization   Final Result by Eladio Bazzi (07/23 1010)        Normal Coronary arteries by angiography     Normal LVEF and normal LVEDP     The left ventricular systolic function is normal.         Echo complete w/ contrast if indicated   Final Result by Libia Barragan MD (07/21 1442)        Left Ventricle: Left ventricular cavity size is normal. Wall thickness    is normal. The left ventricular ejection fraction is 55%. Systolic    function is normal. Wall  motion is normal. Diastolic function is normal.         ECG 12 lead   Final Result by Libia Barragan MD (07/21 1435)   Normal sinus rhythm   Right bundle branch block      Confirmed by Libia Barragan ([id number]) on 7/21/2024 2:34:56 PM      ECG 12 lead   Final Result by Libia Barragan MD (07/21 1437)   Normal sinus rhythm   Right bundle branch block      Confirmed by Libia Barragan ([id number]) on 7/21/2024 2:37:04 PM      ECG 12 lead   Final Result by Libia Barragan MD (07/20 1644)   Normal sinus rhythm   Right bundle branch block   Abnormal ECG   Confirmed by Libia Barragan ([id number]) on 7/20/2024 4:44:36 PM      ECG 12 lead   Final Result by Libia Barragan MD (07/20 1645)   Normal sinus rhythm   Right bundle branch block   Abnormal ECG      Confirmed by Libia Barragan ([id number]) on 7/20/2024 4:45:28 PM      ECG 12 lead   Final Result by Libia Barragan MD (07/20 1646)   Sinus tachycardia   Right bundle branch block   Abnormal ECG   Confirmed by Libia Barragan ([id number]) on 7/20/2024 4:46:47 PM        GI:  No orders to display           Results from last 7 days   Lab Units 07/23/24  0440 07/22/24  0530 07/21/24  1033 07/20/24  1631 07/20/24  1056   WBC Thousand/uL 6.65 5.69 6.15  --  8.07   HEMOGLOBIN g/dL 13.0 12.3 12.9  --  13.3   HEMATOCRIT % 39.3 36.8 38.2  --  38.9   PLATELETS Thousands/uL 316 281 312   < > 321   TOTAL NEUT ABS Thousands/µL  --   --   --   --  5.34    < > = values in this interval not displayed.         Results from last 7 days   Lab Units 07/23/24  0440 07/22/24  0530 07/21/24  0602 07/20/24  1056   SODIUM mmol/L 138 141 141 142   POTASSIUM mmol/L 4.2 3.9 4.2 3.8   CHLORIDE mmol/L 104 106 107 107   CO2 mmol/L 29 29 28 29   ANION GAP mmol/L 5 6 6 6   BUN mg/dL 20 20 15 23   CREATININE mg/dL 0.77 0.67 0.68 0.81   EGFR ml/min/1.73sq m 89 101 100 83   CALCIUM mg/dL 8.9 8.4 8.5 8.9   MAGNESIUM mg/dL  --  2.1  --   --      Results from last 7 days   Lab Units 07/20/24  1056   AST U/L 12*    ALT U/L 9   ALK PHOS U/L 69   TOTAL PROTEIN g/dL 6.5   ALBUMIN g/dL 4.0   TOTAL BILIRUBIN mg/dL 0.25         Results from last 7 days   Lab Units 07/23/24  0440 07/22/24  0530 07/21/24  0602 07/20/24  1056   GLUCOSE RANDOM mg/dL 94 101 102 100     Results from last 7 days   Lab Units 07/21/24  1559 07/21/24  0250 07/20/24  2342 07/20/24  2138 07/20/24  1930 07/20/24  1631 07/20/24  1239 07/20/24  1056   HS TNI 0HR ng/L  --   --  229*  --   --  156*  --  10   HS TNI 2HR ng/L  --  189*  --   --  206*  --  30  --    HSTNI D2 ng/L  --  -40  --   --  50*  --  20*  --    HS TNI 4HR ng/L 105*  --   --  229*  --  150*  --   --    HSTNI D4 ng/L -124  --   --  73*  --  -6  --   --      Results from last 7 days   Lab Units 07/20/24  1056   D-DIMER QUANTITATIVE ug/ml FEU 0.28     Results from last 7 days   Lab Units 07/23/24  0007 07/22/24  1822 07/22/24  1131 07/21/24  1559 07/21/24  1033   PROTIME seconds  --   --   --   --  12.6   INR   --   --   --   --  0.88   PTT seconds 73* 74* 56*   < > 31    < > = values in this interval not displayed.     Results from last 7 days   Lab Units 07/21/24  0602   TSH 3RD GENERATON uIU/mL 2.120       Results from last 7 days   Lab Units 07/20/24  1056   LIPASE u/L 10*         Medications:   Scheduled Medications:  [Transfer Hold] acetaminophen, 975 mg, Oral, Q8H JOANNA  [Transfer Hold] aspirin, 81 mg, Oral, Daily  [Transfer Hold] atorvastatin, 80 mg, Oral, QPM  [Transfer Hold] dicyclomine, 20 mg, Oral, Q6H  [Transfer Hold] divalproex sodium, 500 mg, Oral, BID  [Transfer Hold] fluticasone-vilanterol, 1 puff, Inhalation, Daily  [Transfer Hold] lidocaine, 1 patch, Topical, Daily  [Transfer Hold] loratadine, 10 mg, Oral, Daily  [Transfer Hold] nicotine, 1 patch, Transdermal, Daily  [Transfer Hold] pantoprazole, 20 mg, Oral, Daily Before Breakfast      Continuous IV Infusions:  heparin (porcine), 3-20 Units/kg/hr (Order-Specific), Intravenous, Titrated      PRN Meds:  acetaminophen, 650 mg, Oral,  Q6H PRN  albuterol, 2 puff, Inhalation, Q6H PRN  heparin (porcine), 2,000 Units, Intravenous, Q6H PRN  heparin (porcine), 4,000 Units, Intravenous, Q6H PRN  ondansetron, 4 mg, Intravenous, Q6H PRN        Discharge Plan: TBD     Network Utilization Review Department  ATTENTION: Please call with any questions or concerns to [phone] and carefully listen to the prompts so that you are directed to the right person. All voicemails are confidential.   For Discharge needs, contact Care Management DC Support Team at [phone] opt. [phone]  Send all requests for admission clinical reviews, approved or denied determinations and any other requests to dedicated fax number below belonging to the campus where the patient is receiving treatment. List of dedicated fax numbers for the Facilities:  FACILITY NAME UR FAX NUMBER   ADMISSION DENIALS (Administrative/Medical Necessity) [phone]   DISCHARGE SUPPORT TEAM (NETWORK) [phone]   PARENT CHILD HEALTH (Maternity/NICU/Pediatrics) [phone]   [facility] [phone]   [facility] [phone]   [facility] [phone]   [facility] [phone]   [facility] [phone]   [facility] [phone]   [facility] [phone]   [facility] [phone]   [facility] [phone]   [facility] [phone]   [facility] [phone]   [facility] [phone]

## 2024-07-22 NOTE — PLAN OF CARE
Problem: PAIN - ADULT  Goal: Verbalizes/displays adequate comfort level or baseline comfort level  Description: Interventions:  - Encourage patient to monitor pain and request assistance  - Assess pain using appropriate pain scale  - Administer analgesics based on type and severity of pain and evaluate response  - Implement non-pharmacological measures as appropriate and evaluate response  - Consider cultural and social influences on pain and pain management  - Notify physician/advanced practitioner if interventions unsuccessful or patient reports new pain  Outcome: Progressing     Problem: INFECTION - ADULT  Goal: Absence or prevention of progression during hospitalization  Description: INTERVENTIONS:  - Assess and monitor for signs and symptoms of infection  - Monitor lab/diagnostic results  - Monitor all insertion sites, i.e. indwelling lines, tubes, and drains  - Monitor endotracheal if appropriate and nasal secretions for changes in amount and color  - [place] appropriate cooling/warming therapies per order  - Administer medications as ordered  - Instruct and encourage patient and family to use good hand hygiene technique  - Identify and instruct in appropriate isolation precautions for identified infection/condition  Outcome: Progressing  Goal: Absence of fever/infection during neutropenic period  Description: INTERVENTIONS:  - Monitor WBC    Outcome: Progressing     Problem: SAFETY ADULT  Goal: Patient will remain free of falls  Description: INTERVENTIONS:  - Educate patient/family on patient safety including physical limitations  - Instruct patient to call for assistance with activity   - Consult OT/PT to assist with strengthening/mobility   - Keep Call bell within reach  - Keep bed low and locked with side rails adjusted as appropriate  - Keep care items and personal belongings within reach  - Initiate and maintain comfort rounds  - Apply yellow socks and bracelet for high fall risk patients  - Consider  moving patient to room near nurses station  Outcome: Progressing  Goal: Maintain or return to baseline ADL function  Description: INTERVENTIONS:  -  Assess patient's ability to carry out ADLs; assess patient's baseline for ADL function and identify physical deficits which impact ability to perform ADLs (bathing, care of mouth/teeth, toileting, grooming, dressing, etc.)  - Assess/evaluate cause of self-care deficits   - Assess range of motion  - Assess patient's mobility; develop plan if impaired  - Assess patient's need for assistive devices and provide as appropriate  - Encourage maximum independence but intervene and supervise when necessary  - Involve family in performance of ADLs  - Assess for home care needs following discharge   - Consider OT consult to assist with ADL evaluation and planning for discharge  - Provide patient education as appropriate  Outcome: Progressing  Goal: Maintains/Returns to pre admission functional level  Description: INTERVENTIONS:  - Perform AM-PAC 6 Click Basic Mobility/ Daily Activity assessment daily.  - Set and communicate daily mobility goal to care team and patient/family/caregiver.   - Collaborate with rehabilitation services on mobility goals if consulted  - Out of bed for toileting  - Record patient progress and toleration of activity level   Outcome: Progressing     Problem: DISCHARGE PLANNING  Goal: Discharge to home or other facility with appropriate resources  Description: INTERVENTIONS:  - Identify barriers to discharge w/patient and caregiver  - Arrange for needed discharge resources and transportation as appropriate  - Identify discharge learning needs (meds, wound care, etc.)  - Arrange for interpretive services to assist at discharge as needed  - Refer to Case Management Department for coordinating discharge planning if the patient needs post-hospital services based on physician/advanced practitioner order or complex needs related to functional status, cognitive  ability, or social support system  Outcome: Progressing     Problem: Knowledge Deficit  Goal: Patient/family/caregiver demonstrates understanding of disease process, treatment plan, medications, and discharge instructions  Description: Complete learning assessment and assess knowledge base.  Interventions:  - Provide teaching at level of understanding  - Provide teaching via preferred learning methods  Outcome: Progressing

## 2024-07-22 NOTE — ASSESSMENT & PLAN NOTE
Reports intermittent chest pain with any exertion over the past several weeks  Troponin peaked at 229, most recent 189  Lipid panel showed total Lustral 181, triglyceride 126, HDL 46,   NAVEED score 0  Gram showed LVEF 55%  Given HPI consulted cardiology cardiology recommending  Further ischemic workup, patient currently agreeable to cardiac catheterization tonight versus tomorrow

## 2024-07-22 NOTE — CASE MANAGEMENT
Case Management Assessment & Discharge Planning Note    Patient name Renee Peterson  Location /-01 MRN [id number]  : 1970 Date 2024       Current Admission Date: 2024  Current Admission Diagnosis:Chest pain   Patient Active Problem List    Diagnosis Date Noted Date Diagnosed    Chest pain 2024     Psychiatric diagnosis 2024     Fatigue 2024     Tobacco abuse 2024     Functional abdominal pain syndrome 2021     Nausea 2018     Chronic idiopathic constipation 2018     Lower abdominal pain 2018     Pharyngoesophageal dysphagia 2018     Gastroesophageal reflux disease 2018       LOS (days): 0  Geometric Mean LOS (GMLOS) (days):   Days to GMLOS:     OBJECTIVE:              Current admission status: Inpatient       Preferred Pharmacy:   [facility] [place] [address] [address]  Phone: [phone] Fax: [phone]    Primary Care Provider: No primary care provider on file.    Primary Insurance: [employer]  Secondary Insurance:     ASSESSMENT:  Active Health Care Proxies    There are no active Health Care Proxies on file.       Advance Directives  Does patient have a Health Care POA?: No  Was patient offered paperwork?: Yes  Does patient currently have a Health Care decision maker?: No  Does patient have Advance Directives?: No  Was patient offered paperwork?: Yes  Primary Contact: friend Marcy         Readmission Root Cause  30 Day Readmission: No    Patient Information  Admitted from:: Home  Mental Status: Alert  During Assessment patient was accompanied by: Not accompanied during assessment, Friend  Assessment information provided by:: Patient  Primary Caregiver: Self  Support Systems: Son, Family members, Friend  County of Residence: [place]  What city do you live in?: [place]  Home entry access options. Select all that apply.: No  steps to enter home  Type of Current Residence: Apartment  Floor Level: 1  Upon entering residence, is there a bedroom on the main floor (no further steps)?: Yes  Upon entering residence, is there a bathroom on the main floor (no further steps)?: Yes  Living Arrangements: Lives w/ Son  Is patient a ?: No    Activities of Daily Living Prior to Admission  Functional Status: Independent  Completes ADLs independently?: Yes  Ambulates independently?: Yes  Does patient use assisted devices?: No  Does patient currently own DME?: No  Does patient have a history of Outpatient Therapy (PT/OT)?: No  Does the patient have a history of Short-Term Rehab?: No  Does patient have a history of HHC?: No  Does patient currently have HHC?: No         Patient Information Continued  Income Source: SSI/SSD  Does patient have prescription coverage?: Yes  Does patient receive dialysis treatments?: No  Does patient have a history of substance abuse?: No  Does patient have a history of Mental Health Diagnosis?: No (anxiety, depression, bipolar, takes medications and sees her doctor)    PHQ 2/9 Screening   Reviewed PHQ 2/9 Depression Screening Score?: No    Means of Transportation  Means of Transport to Appts:: Family transport (friend drives)      Social Determinants of Health (SDOH)      Flowsheet Row Most Recent Value   Housing Stability    In the last 12 months, was there a time when you were not able to pay the mortgage or rent on time? N   In the past 12 months, how many times have you moved where you were living? 0   At any time in the past 12 months, were you homeless or living in a shelter (including now)? N   Transportation Needs    In the past 12 months, has lack of transportation kept you from medical appointments or from getting medications? no   In the past 12 months, has lack of transportation kept you from meetings, work, or from getting things needed for daily living? No   Food Insecurity    Within the past 12 months,  you worried that your food would run out before you got the money to buy more. Never true   Within the past 12 months, the food you bought just didn't last and you didn't have money to get more. Never true   Utilities    In the past 12 months has the electric, gas, oil, or water company threatened to shut off services in your home? No            DISCHARGE DETAILS:    Discharge planning discussed with:: Patient and friend at the bedside  Freedom of Choice: Yes  Comments - Freedom of Choice: FOC maintained in discussion regarding discharge planning. Patient is alert oriented and competent to make decisions. Introduced self and role, explained role of CM in arranging services such as DME, STR, HHC, and providing community resource information. Discussed current living situation and needs at discharge. Patient is IPTA. CM offered HHC, STR, DME, PCP, OP CM, community resources. Patient declined CM resources. Does not use DME. Pt is aware and encouraged to seek CM for any questions or concerns. CM continues to follow.  CM contacted family/caregiver?: No- see comments  Were Treatment Team discharge recommendations reviewed with patient/caregiver?: Yes  Did patient/caregiver verbalize understanding of patient care needs?: Yes  Were patient/caregiver advised of the risks associated with not following Treatment Team discharge recommendations?: Yes    Contacts  Patient Contacts: Marcy friend  Relationship to Patient:: Friend  Contact Method: In Person  Reason/Outcome: Emergency Contact    Requested Home Health Care         Is the patient interested in HHC at discharge?: No    DME Referral Provided  Referral made for DME?: No    Other Referral/Resources/Interventions Provided:  Interventions: Declines resources, None Indicated  Referral Comments: CM will continue to follow for needs.    Would you like to participate in our [facility] service program?  : No - Declined       Discharge Destination Plan:: Home  Transport at  Discharge : Family

## 2024-07-22 NOTE — PROGRESS NOTES
[facility]  Progress Note  Name: Renee Peterson I  MRN: [id number]  Unit/Bed#: -01 I Date of Admission: 7/20/2024   Date of Service: 7/22/2024 I Hospital Day: 0    Assessment & Plan   * Chest pain  Assessment & Plan  Reports intermittent chest pain with any exertion over the past several weeks  Troponin peaked at 229, most recent 189  Lipid panel showed total Lustral 181, triglyceride 126, HDL 46,   NAVEED score 0  Gram showed LVEF 55%  Given HPI consulted cardiology cardiology recommending  Further ischemic workup, patient currently agreeable to cardiac catheterization tonight versus tomorrow    Tobacco abuse  Assessment & Plan  Encourage cessation    Fatigue  Assessment & Plan  TSH 2.120, had thyroid procedure years ago, continue follow-up outpatient      Psychiatric diagnosis  Assessment & Plan  Patient has a history of bipolar.  Continue Depakote    Gastroesophageal reflux disease  Assessment & Plan  Continue PPI           VTE Pharmacologic Prophylaxis: VTE Score: 4 Moderate Risk (Score 3-4) - Pharmacological DVT Prophylaxis Ordered: heparin.    Mobility:   Basic Mobility Inpatient Raw Score: 23  JH-HLM Goal: 7: Walk 25 feet or more  JH-HLM Achieved: 7: Walk 25 feet or more  JH-HLM Goal achieved. Continue to encourage appropriate mobility.    Patient Centered Rounds: I performed bedside rounds with nursing staff today.   Discussions with Specialists or Other Care Team Provider: CM, cardiology    Education and Discussions with Family / Patient: Patient declined call to .     Total Time Spent on Date of Encounter in care of patient: 35 mins. This time was spent on one or more of the following: performing physical exam; counseling and coordination of care; obtaining or reviewing history; documenting in the medical record; reviewing/ordering tests, medications or procedures; communicating with other healthcare professionals and discussing with patient's  family/caregivers.    Current Length of Stay: 0 day(s)  Current Patient Status: Observation   Certification Statement: The patient will continue to require additional inpatient hospital stay due to awaiting ischemic workup  Discharge Plan: Anticipate discharge tomorrow to home.    Code Status: Level 1 - Full Code    Subjective:   Patient reports to be feeling well.  Currently denies any chest pain/pressure, palpitations clamminess, nausea, shortness breath, or chills.    Objective:     Vitals:   Temp (24hrs), Av.6 °F (36.4 °C), Min:97.3 °F (36.3 °C), Max:97.9 °F (36.6 °C)    Temp:  [97.3 °F (36.3 °C)-97.9 °F (36.6 °C)] 97.7 °F (36.5 °C)  HR:  [79-94] 90  Resp:  [16] 16  BP: (103-133)/(57-73) 126/73  SpO2:  [95 %-97 %] 96 %  Body mass index is 25.06 kg/m².     Input and Output Summary (last 24 hours):     Intake/Output Summary (Last 24 hours) at 2024 1456  Last data filed at 2024 0855  Gross per 24 hour   Intake 480 ml   Output --   Net 480 ml       Physical Exam:   Physical Exam  Vitals and nursing note reviewed.   Constitutional:       Appearance: She is normal weight.   HENT:      Head: Normocephalic.      Nose: Nose normal.      Mouth/Throat:      Mouth: Mucous membranes are moist.      Pharynx: Oropharynx is clear.   Eyes:      General: No scleral icterus.     Conjunctiva/sclera: Conjunctivae normal.      Pupils: Pupils are equal, round, and reactive to light.   Cardiovascular:      Rate and Rhythm: Normal rate and regular rhythm.      Heart sounds: No murmur heard.     No friction rub. No gallop.   Pulmonary:      Effort: Pulmonary effort is normal. No respiratory distress.      Breath sounds: Normal breath sounds. No stridor. No wheezing, rhonchi or rales.   Abdominal:      General: Abdomen is flat.      Palpations: Abdomen is soft.   Musculoskeletal:         General: Normal range of motion.      Cervical back: Normal range of motion and neck supple.      Right lower leg: No edema.      Left  lower leg: No edema.   Lymphadenopathy:      Cervical: No cervical adenopathy.   Skin:     General: Skin is warm.      Coloration: Skin is not jaundiced or pale.      Findings: No bruising, erythema or lesion.   Neurological:      General: No focal deficit present.      Mental Status: She is alert and oriented to person, place, and time. Mental status is at baseline.      Cranial Nerves: No cranial nerve deficit.      Motor: No weakness.   Psychiatric:         Mood and Affect: Mood normal.         Behavior: Behavior normal.         Thought Content: Thought content normal.          Additional Data:     Labs:  Results from last 7 days   Lab Units 24  0530 24  1631 24  1056   WBC Thousand/uL 5.69   < > 8.07   HEMOGLOBIN g/dL 12.3   < > 13.3   HEMATOCRIT % 36.8   < > 38.9   PLATELETS Thousands/uL 281   < > 321   SEGS PCT %  --   --  65   LYMPHO PCT %  --   --  22   MONO PCT %  --   --  11   EOS PCT %  --   --  1    < > = values in this interval not displayed.     Results from last 7 days   Lab Units 24  0530 24  0602 24  1056   SODIUM mmol/L 141   < > 142   POTASSIUM mmol/L 3.9   < > 3.8   CHLORIDE mmol/L 106   < > 107   CO2 mmol/L 29   < > 29   BUN mg/dL 20   < > 23   CREATININE mg/dL 0.67   < > 0.81   ANION GAP mmol/L 6   < > 6   CALCIUM mg/dL 8.4   < > 8.9   ALBUMIN g/dL  --   --  4.0   TOTAL BILIRUBIN mg/dL  --   --  0.25   ALK PHOS U/L  --   --  69   ALT U/L  --   --  9   AST U/L  --   --  12*   GLUCOSE RANDOM mg/dL 101   < > 100    < > = values in this interval not displayed.     Results from last 7 days   Lab Units 24  1033   INR  0.88                   Lines/Drains:  Invasive Devices       Peripheral Intravenous Line  Duration             Peripheral IV 24 Right Antecubital 2 days                      Telemetry:  Telemetry Orders (From admission, onward)               24 Hour Telemetry Monitoring  Continuous x 24 Hours (Telem)           Question:  Reason for  24 Hour Telemetry  Answer:  Patients with cj/jon/endocarditis; cardiac contusion                     Telemetry Reviewed: Normal Sinus Rhythm  Indication for Continued Telemetry Use: Acute MI/Unstable Angina/Rule out ACS             Imaging: Reviewed radiology reports from this admission including: ECHO    Recent Cultures (last 7 days):         Last 24 Hours Medication List:   Current Facility-Administered Medications   Medication Dose Route Frequency Provider Last Rate    acetaminophen  650 mg Oral Q6H PRN Taco Dubois MD      acetaminophen  975 mg Oral Q8H JOANNA Taco Dubois MD      albuterol  2 puff Inhalation Q6H PRN Taco Dubois MD      aspirin  81 mg Oral Daily Taco Dubois MD      atorvastatin  80 mg Oral QPM Taco Dubois MD      dicyclomine  20 mg Oral Q6H Taco Dubois MD      divalproex sodium  500 mg Oral BID Taco Dubois MD      fluticasone-vilanterol  1 puff Inhalation Daily Taco Dubois MD      heparin (porcine)  3-20 Units/kg/hr (Order-Specific) Intravenous Titrated Sarahi SAVANAH PollardNP 16 Units/kg/hr (07/22/24 1210)    heparin (porcine)  2,000 Units Intravenous Q6H PRN Sarahi JAMIL Poole CRNP      heparin (porcine)  4,000 Units Intravenous Q6H PRN SAVANAH GarciaNP      lidocaine  1 patch Topical Daily Taco Dubois MD      loratadine  10 mg Oral Daily Taco Dubois MD      nicotine  1 patch Transdermal Daily Taco Dubois MD      ondansetron  4 mg Intravenous Q6H PRN Taco Dubois MD      pantoprazole  20 mg Oral Daily Before Breakfast Taco Dubois MD          Today, Patient Was Seen By: Rodrigo Kamara PA-C    **Please Note: This note may have been constructed using a voice recognition system.**

## 2024-07-23 VITALS
SYSTOLIC BLOOD PRESSURE: 115 MMHG | DIASTOLIC BLOOD PRESSURE: 76 MMHG | WEIGHT: 160 LBS | HEIGHT: 67 IN | OXYGEN SATURATION: 94 % | RESPIRATION RATE: 18 BRPM | TEMPERATURE: 98 F | BODY MASS INDEX: 25.11 KG/M2 | HEART RATE: 91 BPM

## 2024-07-23 LAB
ANION GAP SERPL CALCULATED.3IONS-SCNC: 5 MMOL/L (ref 4–13)
APTT PPP: 73 SECONDS (ref 23–37)
BUN SERPL-MCNC: 20 MG/DL (ref 5–25)
CALCIUM SERPL-MCNC: 8.9 MG/DL (ref 8.4–10.2)
CHLORIDE SERPL-SCNC: 104 MMOL/L (ref 96–108)
CO2 SERPL-SCNC: 29 MMOL/L (ref 21–32)
CREAT SERPL-MCNC: 0.77 MG/DL (ref 0.6–1.3)
ERYTHROCYTE [DISTWIDTH] IN BLOOD BY AUTOMATED COUNT: 12.3 % (ref 11.6–15.1)
GFR SERPL CREATININE-BSD FRML MDRD: 89 ML/MIN/1.73SQ M
GLUCOSE SERPL-MCNC: 94 MG/DL (ref 65–140)
HCT VFR BLD AUTO: 39.3 % (ref 34.8–46.1)
HGB BLD-MCNC: 13 G/DL (ref 11.5–15.4)
MCH RBC QN AUTO: 32.5 PG (ref 26.8–34.3)
MCHC RBC AUTO-ENTMCNC: 33.1 G/DL (ref 31.4–37.4)
MCV RBC AUTO: 98 FL (ref 82–98)
PLATELET # BLD AUTO: 316 THOUSANDS/UL (ref 149–390)
PMV BLD AUTO: 9.1 FL (ref 8.9–12.7)
POTASSIUM SERPL-SCNC: 4.2 MMOL/L (ref 3.5–5.3)
RBC # BLD AUTO: 4 MILLION/UL (ref 3.81–5.12)
SODIUM SERPL-SCNC: 138 MMOL/L (ref 135–147)
WBC # BLD AUTO: 6.65 THOUSAND/UL (ref 4.31–10.16)

## 2024-07-23 PROCEDURE — 85027 COMPLETE CBC AUTOMATED: CPT

## 2024-07-23 PROCEDURE — C1769 GUIDE WIRE: HCPCS | Performed by: INTERNAL MEDICINE

## 2024-07-23 PROCEDURE — 93458 L HRT ARTERY/VENTRICLE ANGIO: CPT | Performed by: INTERNAL MEDICINE

## 2024-07-23 PROCEDURE — 99153 MOD SED SAME PHYS/QHP EA: CPT | Performed by: INTERNAL MEDICINE

## 2024-07-23 PROCEDURE — 99152 MOD SED SAME PHYS/QHP 5/>YRS: CPT | Performed by: INTERNAL MEDICINE

## 2024-07-23 PROCEDURE — C1894 INTRO/SHEATH, NON-LASER: HCPCS | Performed by: INTERNAL MEDICINE

## 2024-07-23 PROCEDURE — 85730 THROMBOPLASTIN TIME PARTIAL: CPT | Performed by: STUDENT IN AN ORGANIZED HEALTH CARE EDUCATION/TRAINING PROGRAM

## 2024-07-23 PROCEDURE — 99239 HOSP IP/OBS DSCHRG MGMT >30: CPT

## 2024-07-23 PROCEDURE — 80048 BASIC METABOLIC PNL TOTAL CA: CPT

## 2024-07-23 PROCEDURE — 4A023N7 MEASUREMENT OF CARDIAC SAMPLING AND PRESSURE, LEFT HEART, PERCUTANEOUS APPROACH: ICD-10-PCS | Performed by: STUDENT IN AN ORGANIZED HEALTH CARE EDUCATION/TRAINING PROGRAM

## 2024-07-23 RX ORDER — VERAPAMIL HCL 2.5 MG/ML
AMPUL (ML) INTRAVENOUS CODE/TRAUMA/SEDATION MEDICATION
Status: DISCONTINUED | OUTPATIENT
Start: 2024-07-23 | End: 2024-07-23 | Stop reason: HOSPADM

## 2024-07-23 RX ORDER — NITROGLYCERIN 20 MG/100ML
INJECTION INTRAVENOUS CODE/TRAUMA/SEDATION MEDICATION
Status: DISCONTINUED | OUTPATIENT
Start: 2024-07-23 | End: 2024-07-23 | Stop reason: HOSPADM

## 2024-07-23 RX ORDER — SODIUM CHLORIDE 9 MG/ML
75 INJECTION, SOLUTION INTRAVENOUS CONTINUOUS
Status: DISCONTINUED | OUTPATIENT
Start: 2024-07-23 | End: 2024-07-23 | Stop reason: HOSPADM

## 2024-07-23 RX ORDER — METOPROLOL TARTRATE 1 MG/ML
INJECTION, SOLUTION INTRAVENOUS CODE/TRAUMA/SEDATION MEDICATION
Status: DISCONTINUED | OUTPATIENT
Start: 2024-07-23 | End: 2024-07-23 | Stop reason: HOSPADM

## 2024-07-23 RX ORDER — NICOTINE 21 MG/24HR
1 PATCH, TRANSDERMAL 24 HOURS TRANSDERMAL DAILY
Qty: 28 PATCH | Refills: 0 | Status: SHIPPED | OUTPATIENT
Start: 2024-07-24

## 2024-07-23 RX ORDER — LIDOCAINE WITH 8.4% SOD BICARB 0.9%(10ML)
SYRINGE (ML) INJECTION CODE/TRAUMA/SEDATION MEDICATION
Status: DISCONTINUED | OUTPATIENT
Start: 2024-07-23 | End: 2024-07-23 | Stop reason: HOSPADM

## 2024-07-23 RX ORDER — HEPARIN SODIUM 1000 [USP'U]/ML
INJECTION, SOLUTION INTRAVENOUS; SUBCUTANEOUS CODE/TRAUMA/SEDATION MEDICATION
Status: DISCONTINUED | OUTPATIENT
Start: 2024-07-23 | End: 2024-07-23 | Stop reason: HOSPADM

## 2024-07-23 RX ORDER — FENTANYL CITRATE 50 UG/ML
INJECTION, SOLUTION INTRAMUSCULAR; INTRAVENOUS CODE/TRAUMA/SEDATION MEDICATION
Status: DISCONTINUED | OUTPATIENT
Start: 2024-07-23 | End: 2024-07-23 | Stop reason: HOSPADM

## 2024-07-23 RX ORDER — MIDAZOLAM HYDROCHLORIDE 2 MG/2ML
INJECTION, SOLUTION INTRAMUSCULAR; INTRAVENOUS CODE/TRAUMA/SEDATION MEDICATION
Status: DISCONTINUED | OUTPATIENT
Start: 2024-07-23 | End: 2024-07-23 | Stop reason: HOSPADM

## 2024-07-23 RX ADMIN — PANTOPRAZOLE SODIUM 20 MG: 20 TABLET, DELAYED RELEASE ORAL at 06:28

## 2024-07-23 RX ADMIN — FLUTICASONE FUROATE AND VILANTEROL TRIFENATATE 1 PUFF: 200; 25 POWDER RESPIRATORY (INHALATION) at 10:19

## 2024-07-23 RX ADMIN — NICOTINE 1 PATCH: 21 PATCH, EXTENDED RELEASE TRANSDERMAL at 10:18

## 2024-07-23 RX ADMIN — ASPIRIN 81 MG: 81 TABLET, CHEWABLE ORAL at 10:16

## 2024-07-23 RX ADMIN — SODIUM CHLORIDE 75 ML/HR: 0.9 INJECTION, SOLUTION INTRAVENOUS at 10:12

## 2024-07-23 RX ADMIN — LIDOCAINE 1 PATCH: 50 PATCH CUTANEOUS at 10:17

## 2024-07-23 RX ADMIN — DIVALPROEX SODIUM 500 MG: 500 TABLET, FILM COATED, EXTENDED RELEASE ORAL at 10:17

## 2024-07-23 RX ADMIN — ACETAMINOPHEN 975 MG: 325 TABLET, FILM COATED ORAL at 06:26

## 2024-07-23 RX ADMIN — DICYCLOMINE HYDROCHLORIDE 20 MG: 20 TABLET ORAL at 02:54

## 2024-07-23 NOTE — CASE MANAGEMENT
Case Management Progress Note    Patient name Renee Peterson  Location /-01 MRN [id number]  : 1970 Date 2024       LOS (days): 1  Geometric Mean LOS (GMLOS) (days):   Days to GMLOS:        OBJECTIVE:        Current admission status: Inpatient  Preferred Pharmacy:   [facility] - [address] [address]  [address] [address]  [place] [address]  Phone: [phone] Fax: [phone]    Primary Care Provider: No primary care provider on file.    Primary Insurance: [employer]  Secondary Insurance:     PROGRESS NOTE:  Discussed patient's case in interdisciplinary rounds this morning with SLIM provider. Patient is cleared for discharge today after cardiac catheterization; SLIM pending official clearance from cardiology. CM will continue to follow for needs.

## 2024-07-23 NOTE — UTILIZATION REVIEW
NOTIFICATION OF INPATIENT ADMISSION   AUTHORIZATION REQUEST   SERVICING FACILITY:   [place] [address]  Tax ID: [id number]  NPI: [id number] ATTENDING PROVIDER:  Attending Name and NPI#: Osvaldo Munoz Md [[id number]]  Address: [address] [place] [address]  Phone: [phone]     ADMISSION INFORMATION:  Place of Service: Inpatient [facility]  Place of Service Code: 21  Inpatient Admission Date/Time: 7/22/24  2:59 PM  Discharge Date/Time: No discharge date for patient encounter.  Admitting Diagnosis Code/Description:  Chest pain [R07.9]  Chest pain, unspecified type [R07.9]     UTILIZATION REVIEW CONTACT:  Candy Almanzar Utilization   Network Utilization Review Department  Phone: [phone]  Fax [phone]  Email: [email or web address]  Contact for approvals/pending authorizations, clinical reviews, and discharge.     PHYSICIAN ADVISORY SERVICES:  Medical Necessity Denial & Hoyt-sx-Lzmi Review  Phone: [phone]  Fax: [phone]  Email: [email or web address]     DISCHARGE SUPPORT TEAM:  For Patients Discharge Needs & Updates  Phone: [phone] opt. 2 Fax: [phone]  Email: [email or web address]

## 2024-07-23 NOTE — DISCHARGE SUMMARY
[facility]  Discharge- Renee Peterson 1970, 54 y.o. female MRN: [id number]  Unit/Bed#: -01 Encounter: [id number]  Primary Care Provider: No primary care provider on file.   Date and time admitted to hospital: 7/20/2024 10:38 AM    * Chest pain  Assessment & Plan  Reports intermittent chest pain with any exertion over the past several weeks  Troponin peaked at 229, most recent 189  Lipid panel showed total Lustral 181, triglyceride 126, HDL 46,   NAVEED score 0  Echocardiogram showed LVEF 55%  Given HPI consulted cardiology cardiology recommending  Cardiac catheterization conducted which showed no coronary artery disease, chest pain likely noncardiac origin    Tobacco abuse  Assessment & Plan  Encourage cessation    Fatigue  Assessment & Plan  TSH 2.120, had thyroid procedure years ago, continue follow-up outpatient      Psychiatric diagnosis  Assessment & Plan  Patient has a history of bipolar.  Continue Depakote    Gastroesophageal reflux disease  Assessment & Plan  Continue PPI      Medical Problems       Resolved Problems  Date Reviewed: 7/20/2024   None       Discharging Physician / Practitioner: Rodrigo Kamara PA-C  PCP: No primary care provider on file.  Admission Date:   Admission Orders (From admission, onward)       Ordered        07/22/24 1459  INPATIENT ADMISSION  Once            07/20/24 1432  Place in Observation  Once                          Discharge Date: 07/23/24    Consultations During Hospital Stay:  Cardiology    Procedures Performed:   Cardiac catheterization  Narrative:   Normal Coronary arteries by angiography   Normal LVEF and normal LVEDP   The left ventricular systolic function is normal.     Echo complete w/ contrast if indicated  Result Date: 7/21/2024  Narrative:   Left Ventricle: Left ventricular cavity size is normal. Wall thickness is normal. The left ventricular ejection fraction is 55%. Systolic function is normal. Wall motion is normal.  "Diastolic function is normal.     XR chest 2 views  Impression: No acute cardiopulmonary disease.     Significant Findings / Test Results:   Troponins peaked at 229  TSH 2.120      Incidental Findings:   None      Test Results Pending at Discharge (will require follow up):   None     Outpatient Tests Requested:  None    Complications: None    Reason for Admission: Chest pain    Hospital Course:   Renee Peterson is a 54 y.o. female patient who originally presented to the hospital on 7/20/2024 due to chest pain.  Patient is been having intermittent chest pain in the outpatient setting that occurs with exertion.  Chest pain is short-lived as she has previously stopped exertion and dissipates shortly thereafter.  The patient also had mildly elevated troponins and ultimately cardiology team was consulted.  The cardiology team elected to do an ischemic workup which was a cardiac catheterization.  Cardiac catheter showed no evidence of CAD.  Ultimately has elected to continue with lifestyle modifications and continued outpatient workup by PCP.  At this time patient is medically stable for discharge and agreeable for plan at discharge.  For further information in regards to course hospitalization, please see notes attached.      Please see above list of diagnoses and related plan for additional information.     Condition at Discharge: good    Discharge Day Visit / Exam:   Subjective: Patient reports to be feeling well.  Currently denies any chest pain/pressure, palpitations, NS, nausea, shortness breath, or chills.  Vitals: Blood Pressure: 114/72 (07/23/24 1050)  Pulse: 84 (07/23/24 1050)  Temperature: 98 °F (36.7 °C) (07/23/24 1050)  Temp Source: Oral (07/23/24 1050)  Respirations: 18 (07/23/24 1050)  Height: 5' 7\" (170.2 cm) (07/21/24 1400)  Weight - Scale: 72.6 kg (160 lb) (07/21/24 1400)  SpO2: 98 % (07/23/24 0719)  Exam:   Physical Exam  Vitals and nursing note reviewed.   Constitutional:       Appearance: She is normal " weight.   HENT:      Head: Normocephalic.      Nose: Nose normal.      Mouth/Throat:      Mouth: Mucous membranes are moist.      Pharynx: Oropharynx is clear.   Eyes:      General: No scleral icterus.     Conjunctiva/sclera: Conjunctivae normal.      Pupils: Pupils are equal, round, and reactive to light.   Cardiovascular:      Rate and Rhythm: Normal rate and regular rhythm.      Heart sounds: No murmur heard.     No friction rub. No gallop.   Pulmonary:      Effort: Pulmonary effort is normal. No respiratory distress.      Breath sounds: Normal breath sounds. No stridor. No wheezing, rhonchi or rales.   Abdominal:      General: Abdomen is flat.      Palpations: Abdomen is soft.   Musculoskeletal:         General: Normal range of motion.      Cervical back: Normal range of motion and neck supple.      Right lower leg: No edema.      Left lower leg: No edema.   Lymphadenopathy:      Cervical: No cervical adenopathy.   Skin:     General: Skin is warm.      Coloration: Skin is not jaundiced or pale.      Findings: No bruising, erythema or lesion.   Neurological:      General: No focal deficit present.      Mental Status: She is alert and oriented to person, place, and time. Mental status is at baseline.      Cranial Nerves: No cranial nerve deficit.      Motor: No weakness.   Psychiatric:         Mood and Affect: Mood normal.         Behavior: Behavior normal.         Thought Content: Thought content normal.          Discussion with Family: Attempted to update  (daughter) via phone. Unable to contact.    Discharge instructions/Information to patient and family:   See after visit summary for information provided to patient and family.      Provisions for Follow-Up Care:  See after visit summary for information related to follow-up care and any pertinent home health orders.      Mobility at time of Discharge:   Basic Mobility Inpatient Raw Score: 23  JH-HLM Goal: 7: Walk 25 feet or more  JH-HLM Achieved:  7: Walk 25 feet or more  HLM Goal achieved. Continue to encourage appropriate mobility.     Disposition:   Home    Planned Readmission: No     Discharge Statement:  I spent 60 minutes discharging the patient. This time was spent on the day of discharge. I had direct contact with the patient on the day of discharge. Greater than 50% of the total time was spent examining patient, answering all patient questions, arranging and discussing plan of care with patient as well as directly providing post-discharge instructions.  Additional time then spent on discharge activities.    Discharge Medications:  See after visit summary for reconciled discharge medications provided to patient and/or family.      **Please Note: This note may have been constructed using a voice recognition system**

## 2024-07-23 NOTE — ASSESSMENT & PLAN NOTE
Reports intermittent chest pain with any exertion over the past several weeks  Troponin peaked at 229, most recent 189  Lipid panel showed total Lustral 181, triglyceride 126, HDL 46,   NAVEED score 0  Echocardiogram showed LVEF 55%  Given HPI consulted cardiology cardiology recommending  Cardiac catheterization conducted which showed no coronary artery disease, chest pain likely noncardiac origin

## 2024-07-23 NOTE — DISCHARGE INSTR - AVS FIRST PAGE
1. Please see the post cardiac catheterization dishcarge instructions.   No heavy lifting, greater than 10 lbs. or strenuous  activity for 48 hrs.    2.Remove band aid tomorrow.  Shower and wash area- wrist gently with soap and water- beginning tomorrow. Rinse and pat dry.  Apply new water seal band aid.  Repeat this process for 5 days. No powders, creams lotions or antibiotic ointments  for 5 days.  No tub baths, hot tubs or swimming for 5 days.     3. Please call our office ([phone]) if you have any fever, redness, swelling, discharge from your wrist access site.    4.No driving for 2 days

## 2025-05-06 ENCOUNTER — OFFICE VISIT (OUTPATIENT)
Age: 55
End: 2025-05-06
Payer: COMMERCIAL

## 2025-05-06 VITALS
BODY MASS INDEX: 25.28 KG/M2 | DIASTOLIC BLOOD PRESSURE: 74 MMHG | TEMPERATURE: 97 F | SYSTOLIC BLOOD PRESSURE: 119 MMHG | HEART RATE: 104 BPM | RESPIRATION RATE: 18 BRPM | OXYGEN SATURATION: 97 % | WEIGHT: 161.4 LBS

## 2025-05-06 DIAGNOSIS — J02.9 SORE THROAT: Primary | ICD-10-CM

## 2025-05-06 DIAGNOSIS — B34.9 ACUTE VIRAL SYNDROME: ICD-10-CM

## 2025-05-06 LAB — S PYO AG THROAT QL: NEGATIVE

## 2025-05-06 PROCEDURE — 99213 OFFICE O/P EST LOW 20 MIN: CPT | Performed by: PHYSICIAN ASSISTANT

## 2025-05-06 PROCEDURE — 87070 CULTURE OTHR SPECIMN AEROBIC: CPT | Performed by: PHYSICIAN ASSISTANT

## 2025-05-06 PROCEDURE — 87880 STREP A ASSAY W/OPTIC: CPT | Performed by: PHYSICIAN ASSISTANT

## 2025-05-06 RX ORDER — HYDROXYZINE HYDROCHLORIDE 50 MG/1
TABLET, FILM COATED ORAL
COMMUNITY
Start: 2025-05-05

## 2025-05-06 RX ORDER — SUCRALFATE 1 G/1
TABLET ORAL
COMMUNITY
Start: 2025-05-02

## 2025-05-06 RX ORDER — TRAZODONE HYDROCHLORIDE 50 MG/1
50 TABLET ORAL
COMMUNITY
Start: 2024-11-08

## 2025-05-06 RX ORDER — CYCLOBENZAPRINE HCL 5 MG
TABLET ORAL
COMMUNITY
Start: 2025-04-21

## 2025-05-06 RX ORDER — PREGABALIN 50 MG/1
CAPSULE ORAL
COMMUNITY
Start: 2025-01-15

## 2025-05-06 NOTE — PROGRESS NOTES
[facility]        NAME: Renee Peterson is a 55 y.o. female  : 1970    MRN: [id number]  DATE: May 6, 2025  TIME: 12:44 PM    Assessment and Plan   Sore throat [J02.9]  1. Sore throat  POCT rapid strepA    Throat culture      2. Acute viral syndrome              The patient and/or parent/legal guardian verbalized understanding of exam findings and   Treatment plan. We engaged in the shared decision-making process and treatment options were   discussed at length with the patient.  All questions, concerns and complaints were answered and   addressed to the patient's' and/or parent/legal guardians's satisfaction.    Patient Instructions     Patient Instructions   Most upper respiratory infections are viral and resolve on their own within 10-14 days. Antibiotics are not indicated for the viral infection, and are only prescribed if there is evidence for a bacterial infection. Sometimes an upper respiratory infection may lead to secondary bacterial infection, such as bacterial sinusitis, in which case antibiotics would be indicated at that time. If your symptoms continue beyond 10-14 days or if you experience ongoing fevers, productive cough with green, brown, bloody phlegm production, you may have developed a bacterial infection. For the uncomplicated viral upper respiratory infection conservative management includes:    Fever and pain control:  Ibuprofen (Motrin) 600mg every 6 hours for fever, headaches, body aches   Ibuprofen is an NSAID. Please stop medication if you experience stomach/abdominal pain and report to your primary care provider.   Ask your primary care provider before you take NSAIDs if you are on any blood thinners, or if you have a history of heart disease, kidney disease, gastric bypass surgery, GI bleed, or poorly controlled high blood pressure.   May use acetaminophen (Tylenol) as directed on the bottle between doses of ibuprofen. Do not exceed 3,000mg of Tylenol a day.   Cough &  Congestion:  Guaifenesin (Mucinex) as directed on the bottle for congestion and mucous-y cough.   Dextromethorphan (Delsym, Robitussin) for dry cough and cough suppression   Pseudoephedrine (Sudafed) for congestion and sinus pressure   Sudafed may cause increased heart rate, irregular heart rate, and an increase in blood pressure. Please do not take Sudafed if you have a history of heart disease or high blood pressure.   Sudafed should not be taken if you are on anti-depressants such as those belonging to the class MAOIs or tricyclics.  Coricidin HBP (chlorpheniramine maleate) can be used as a decongestant in place of other options for those unable to take Sudafed.   Combination cough and cold such as Dimetapp and Mucinex DM also available  Sudafed PE Head Congestion +Flu Severe contains a combination of Sudafed, Tylenol, Mucinex, and Delsym  If prescribed, take Tessalon Pearles or Bromfed/Phenergan DM as directed  Avoid taking prescription cough/congestion medication and OTC options at the same time  Sore Throat:  Cepacol lozenges  Chloraseptic spray  Throat Coat tea  Warm salt water gargles   Raw honey  Vitamin/Minerals:  Vitamin D3 2,000 IU daily  Vitamin C 1000mg twice a day  Some studies suggest that Zinc 12.5-15mg every 2 hours while awake for 5 days may shorten symptom duration by 1-2 days  Other:   Plenty of fluids and rest  Cool mist humidifiers  Nasal sinus rinses such as NettiPot, Neimed, or Navage can be used to help flush out sinuses  Please only use distilled/sterile water that can be purchased at your local pharmacy  Nasal spray options:  Nasal steroid sprays such as Flonase, Nasonex, Nasacort may help with sinus congestion, itchy/watery eyes, clogged ears  These options must be used consistently for at least 2 weeks for full effect  Afrin nasal spray for quick acting congestion relief (DO NOT USE FOR MORE THAN 3 DAYS IN A ROW)  Saline nasal spray for dry nose, irritation of the nasal passages  Follow  up with PCP in 3-5 days  Proceed to the ED if symptoms worsen    Follow up with PCP in 3-5 days.  Proceed to  ER if symptoms worsen.    If tests are performed, our office will contact you with results only if   changes need to made to the care plan discussed with you at the visit.   You can review your full results on [facility].     Chief Complaint     Chief Complaint   Patient presents with   • Sore Throat     Symptoms started yesterday. C/o chills and congestion. Otc taken today.          History of Present Illness       HPI  Symptoms started yesterday, has had chills and congestions. Otc taken . No fever but has felt warm. Having some body aches in low back. No GI symptoms.     Review of Systems   Review of Systems  All other related systems reviewed with patient or accompanying historian and are negative except as noted in HPI    Current Medications       Current Outpatient Medications:   •  albuterol (PROVENTIL HFA,VENTOLIN HFA) 90 mcg/act inhaler, Inhale 2 puffs every 6 (six) hours as needed, Disp: , Rfl:   •  cyclobenzaprine (FLEXERIL) 5 mg tablet, Take 1 tablet by mouth twice daily for HEADACHE. Will make you sleepy., Disp: , Rfl:   •  divalproex sodium (DEPAKOTE ER) 500 mg 24 hr tablet, Take 500 mg by mouth 2 (two) times a day, Disp: , Rfl: 2  •  famotidine (PEPCID) 20 mg tablet, TAKE TWO TABLETS BY MOUTH AT BEDTIME, Disp: 60 tablet, Rfl: 2  •  Fluticasone-Salmeterol (Advair) 100-50 mcg/dose inhaler, , Disp: , Rfl:   •  hydrocortisone 2.5 % cream, Insert into the rectum 2 (two) times a day., Disp: , Rfl:   •  lidocaine (LIDODERM) 5 %, Place 1 patch on the skin every 12 (twelve) hours. Remove & Discard patch within 12 hours or as directed by MD, Disp: , Rfl:   •  loratadine (CLARITIN) 10 mg tablet, Take 10 mg by mouth daily, Disp: , Rfl: 5  •  montelukast (SINGULAIR) 10 mg tablet, Take 1 tablet (10 mg total) by mouth nightly., Disp: , Rfl:   •  Multiple Vitamins-Calcium (ONE-A-DAY WOMENS PO), Take 1  tablet by mouth daily., Disp: , Rfl: 11  •  nicotine (NICODERM CQ) 21 mg/24 hr TD 24 hr patch, Place 1 patch on the skin over 24 hours daily, Disp: 28 patch, Rfl: 0  •  omeprazole (PriLOSEC) 40 MG capsule, , Disp: , Rfl:   •  sucralfate (CARAFATE) 1 g tablet, Take 1 tablet by mouth twice daily as needed for abdominal pain/burning, Disp: , Rfl:   •  celecoxib (CeleBREX) 200 mg capsule, Take 200 mg by mouth 2 (two) times a day as needed, Disp: , Rfl:   •  Diclofenac Sodium (VOLTAREN) 1 %, Apply 1 application topically 4 (four) times a day (Patient not taking: Reported on 7/20/2024), Disp: , Rfl:   •  dicyclomine (BENTYL) 20 mg tablet, Take 20 mg by mouth every 6 (six) hours (Patient not taking: Reported on 7/20/2024), Disp: , Rfl:   •  fluticasone (Flovent HFA) 44 mcg/act inhaler, Inhale 2 puffs 2 (two) times a day, Disp: , Rfl:   •  hydrOXYzine HCL (ATARAX) 50 mg tablet, , Disp: , Rfl:   •  ibuprofen (MOTRIN) 600 mg tablet, , Disp: , Rfl:   •  methylPREDNISolone 4 MG tablet therapy pack, Take as directed per package instructions. (Patient not taking: Reported on 5/6/2025), Disp: , Rfl:   •  naproxen (NAPROSYN) 500 mg tablet, Take 1 tablet (500 mg total) by mouth 2 (two) times a day with meals (Patient not taking: Reported on 7/20/2024), Disp: 30 tablet, Rfl: 0  •  NIX CREME RINSE 1 % liquid, Apply topically one time for 1 dose. Reapply in one week (Patient not taking: Reported on 7/20/2024), Disp: , Rfl: 2  •  ondansetron (ZOFRAN) 4 mg tablet, Take 4 mg by mouth (Patient not taking: Reported on 7/20/2024), Disp: , Rfl:   •  polyethylene glycol (GLYCOLAX) 17 GM/SCOOP powder, Take 17 g by mouth daily For hard stools - can decrease to every other day if stools become loose, Disp: 510 g, Rfl: 1  •  pregabalin (LYRICA) 50 mg capsule, Take 1-2 at night (Patient not taking: Reported on 5/6/2025), Disp: , Rfl:   •  traZODone (DESYREL) 50 mg tablet, Take 50 mg by mouth (Patient not taking: Reported on 5/6/2025), Disp: , Rfl:      Current Allergies     Allergies as of 05/06/2025 - Reviewed 05/06/2025   Allergen Reaction Noted   • Bee venom  03/23/2015   • Beeswax  10/07/2015   • Penicillins  12/29/2016            The following portions of the patient's history were reviewed and updated as appropriate: allergies, current medications, past family history, past medical history, past social history, past surgical history and problem list.     Past Medical History:   Diagnosis Date   • GERD (gastroesophageal reflux disease)    • Psychiatric disorder        Past Surgical History:   Procedure Laterality Date   • CARDIAC CATHETERIZATION Left 7/23/2024    Procedure: Cardiac catheterization;  Surgeon: Eladio Bazzi;  Location: [facility];  Service: Cardiology   • CARDIAC CATHETERIZATION N/A 7/23/2024    Procedure: Cardiac Coronary Angiogram;  Surgeon: Eladio Bazzi;  Location: [facility];  Service: Cardiology   • CARDIAC CATHETERIZATION Left 7/23/2024    Procedure: Cardiac Left Heart Cath;  Surgeon: Eladio Bazzi;  Location: [facility];  Service: Cardiology   • CARDIAC CATHETERIZATION Left 7/23/2024    Procedure: Cardiac Left Ventriculogram;  Surgeon: Eladio Bazzi;  Location: [facility];  Service: Cardiology   • CHOLECYSTECTOMY     • TUBAL LIGATION         No family history on file.      Medications have been verified.        Objective   /74   Pulse 104   Temp (!) 97 °F (36.1 °C)   Resp 18   Wt 73.2 kg (161 lb 6.4 oz)   SpO2 97%   BMI 25.28 kg/m²   No LMP recorded. Patient is premenopausal.       Physical Exam     Physical Exam  Constitutional:       General: She is not in acute distress.     Appearance: She is well-developed.   HENT:      Head: Normocephalic and atraumatic.      Mouth/Throat:      Mouth: Mucous membranes are moist.      Pharynx: Uvula midline. Posterior oropharyngeal erythema present. No pharyngeal swelling, oropharyngeal exudate or uvula swelling.      Tonsils:  "No tonsillar exudate or tonsillar abscesses. 1+ on the right. 1+ on the left.   Eyes:      General: No scleral icterus.     Conjunctiva/sclera: Conjunctivae normal.   Neck:      Trachea: No tracheal deviation.   Pulmonary:      Effort: Pulmonary effort is normal. No respiratory distress.      Breath sounds: No stridor.   Musculoskeletal:      Cervical back: Normal range of motion.   Skin:     General: Skin is warm and dry.      Findings: No erythema.   Neurological:      Mental Status: She is alert and oriented to person, place, and time.   Psychiatric:         Behavior: Behavior normal.         Ortho Exam        Procedures  No Procedures performed today        Note: Portions of this record may have been created with voice recognition software. Occasional wrong word or \"sound a like\" substitutions may have occurred due to the inherent limitations of voice recognition software. Please read the chart carefully and recognize, using context, where substitutions have occurred.*      "

## 2025-05-06 NOTE — PATIENT INSTRUCTIONS
Most upper respiratory infections are viral and resolve on their own within 10-14 days. Antibiotics are not indicated for the viral infection, and are only prescribed if there is evidence for a bacterial infection. Sometimes an upper respiratory infection may lead to secondary bacterial infection, such as bacterial sinusitis, in which case antibiotics would be indicated at that time. If your symptoms continue beyond 10-14 days or if you experience ongoing fevers, productive cough with green, brown, bloody phlegm production, you may have developed a bacterial infection. For the uncomplicated viral upper respiratory infection conservative management includes:    Fever and pain control:  Ibuprofen (Motrin) 600mg every 6 hours for fever, headaches, body aches   Ibuprofen is an NSAID. Please stop medication if you experience stomach/abdominal pain and report to your primary care provider.   Ask your primary care provider before you take NSAIDs if you are on any blood thinners, or if you have a history of heart disease, kidney disease, gastric bypass surgery, GI bleed, or poorly controlled high blood pressure.   May use acetaminophen (Tylenol) as directed on the bottle between doses of ibuprofen. Do not exceed 3,000mg of Tylenol a day.   Cough & Congestion:  Guaifenesin (Mucinex) as directed on the bottle for congestion and mucous-y cough.   Dextromethorphan (Delsym, Robitussin) for dry cough and cough suppression   Pseudoephedrine (Sudafed) for congestion and sinus pressure   Sudafed may cause increased heart rate, irregular heart rate, and an increase in blood pressure. Please do not take Sudafed if you have a history of heart disease or high blood pressure.   Sudafed should not be taken if you are on anti-depressants such as those belonging to the class MAOIs or tricyclics.  Coricidin HBP (chlorpheniramine maleate) can be used as a decongestant in place of other options for those unable to take Sudafed.   Combination  cough and cold such as Dimetapp and Mucinex DM also available  Sudafed PE Head Congestion +Flu Severe contains a combination of Sudafed, Tylenol, Mucinex, and Delsym  If prescribed, take Tessalon Pearles or Bromfed/Phenergan DM as directed  Avoid taking prescription cough/congestion medication and OTC options at the same time  Sore Throat:  Cepacol lozenges  Chloraseptic spray  Throat Coat tea  Warm salt water gargles   Raw honey  Vitamin/Minerals:  Vitamin D3 2,000 IU daily  Vitamin C 1000mg twice a day  Some studies suggest that Zinc 12.5-15mg every 2 hours while awake for 5 days may shorten symptom duration by 1-2 days  Other:   Plenty of fluids and rest  Cool mist humidifiers  Nasal sinus rinses such as NettiPot, Neimed, or Navage can be used to help flush out sinuses  Please only use distilled/sterile water that can be purchased at your local pharmacy  Nasal spray options:  Nasal steroid sprays such as Flonase, Nasonex, Nasacort may help with sinus congestion, itchy/watery eyes, clogged ears  These options must be used consistently for at least 2 weeks for full effect  Afrin nasal spray for quick acting congestion relief (DO NOT USE FOR MORE THAN 3 DAYS IN A ROW)  Saline nasal spray for dry nose, irritation of the nasal passages  Follow up with PCP in 3-5 days  Proceed to the ED if symptoms worsen

## 2025-05-08 LAB — BACTERIA THROAT CULT: NORMAL

## 2025-05-13 ENCOUNTER — TELEPHONE (OUTPATIENT)
Age: 55
End: 2025-05-13

## 2025-05-13 NOTE — TELEPHONE ENCOUNTER
Called pt to establish with PCP at [place] - pt states they are established and actively see Dr. Ruiz provider on file as PCP thru [facility]    pt declined scheduling

## (undated) DEVICE — GLIDESHEATH SLENDER STAINLESS STEEL KIT: Brand: GLIDESHEATH SLENDER

## (undated) DEVICE — RADIFOCUS OPTITORQUE ANGIOGRAPHIC CATHETER: Brand: OPTITORQUE

## (undated) DEVICE — CATH DIAG 6FR IMPULSE 110CM PIG

## (undated) DEVICE — GUIDEWIRE WHOLEY HI TORQUE INTERM MOD J.035 260CM